# Patient Record
Sex: MALE | Race: WHITE | NOT HISPANIC OR LATINO | Employment: FULL TIME | URBAN - METROPOLITAN AREA
[De-identification: names, ages, dates, MRNs, and addresses within clinical notes are randomized per-mention and may not be internally consistent; named-entity substitution may affect disease eponyms.]

---

## 2017-06-15 ENCOUNTER — ALLSCRIPTS OFFICE VISIT (OUTPATIENT)
Dept: OTHER | Facility: OTHER | Age: 27
End: 2017-06-15

## 2018-01-02 ENCOUNTER — ALLSCRIPTS OFFICE VISIT (OUTPATIENT)
Dept: OTHER | Facility: OTHER | Age: 28
End: 2018-01-02

## 2018-01-03 NOTE — PROGRESS NOTES
Assessment   1  Acute bronchitis due to other specified organisms (466 0) (J20 8)    Plan   Acute bronchitis due to other specified organisms    · Breo Ellipta 200-25 MCG/INH Inhalation Aerosol Powder Breath Activated; 1 puff by    mouth daily - rinse mouth out after use   · Doxycycline Hyclate 100 MG Oral Capsule; Take 1 capsule twice daily   · Promethazine-Codeine 6 25-10 MG/5ML Oral Syrup; TAKE 5 ML EVERY 4 TO 6    HOURS AS NEEDED FOR COUGH    Discussion/Summary      81-year-old male here today for symptoms consistent with acute bronchitis  Based on exam findings I will cover for bacteria with a course of doxycycline b i d  with side effects discussed  Sample of [facility] was also provided in the office to help with cough and bronchospasm as well as mucus  He was advised to use 1 puff daily and rinse out mouth after every use  For nighttime coughing that has been persistent for him he was given a printed prescription for promethazine with codeine and advised to avoid working or operating a vehicle while taking  I did also recommend considering plain Sudafed OTC to help nasal congestion as well  Rest and fluids were advised  We will see how he response to treatment over the next 5-7 days and he was advised cough should symptoms persist or worsen despite treatment  Possible side effects of new medications were reviewed with the patient/guardian today  The treatment plan was reviewed with the patient/guardian  The patient/guardian understands and agrees with the treatment plan      Chief Complaint   1  Cold Symptoms   2  Cough  Patient presents with c/o cough and congestion that began a few days ago  History of Present Illness   HPI: 28y/o male here today for 1 week of URI sxs  Reports sinus pressure, nasal congestion  sore throat from coughing, mucous thick, chest feels tight and hurts  pt has tried motrin and bromfed DM  Review of Systems        Constitutional: as noted in HPI       ENT: as noted in HPI  Cardiovascular: no complaints of slow or fast heart rate, no chest pain, no palpitations, no leg claudication or lower extremity edema  Respiratory: as noted in HPI  Past Medical History   1  History of Ear pain, right (388 70) (H92 01)   2  History of Foreign body of hand, right, infected (914 7) (S60 551A,L08 9)   3  History of acute pharyngitis (V12 69) (Z87 09)   4  History of Jaw pain (784 92) (R68 84)   5  History of Right shoulder pain (719 41) (M25 511)    Social History    · Current some day smoker (305 1) (F17 200)  The social history was reviewed and updated today  Current Meds    1  No Reported Medications Recorded     The medication list was reviewed and updated today  Allergies   1  No Known Drug Allergies    Vitals    Recorded: [id number] 01:46PM   Temperature 98 1 F, Tympanic   Heart Rate 95   Pulse Quality Normal   Systolic 705, LUE, Sitting   Diastolic 90, LUE, Sitting   Height 5 ft 7 in   Weight 204 lb 4 oz   BMI Calculated 31 99   BSA Calculated 2 04   O2 Saturation 98, RA     Physical Exam        Constitutional      General appearance: Abnormal   acutely ill,-- comfortable,-- within normal limits of ideal weight,-- appears tired-- and-- appearance reflects stated age  Ears, Nose, Mouth, and Throat      External inspection of ears and nose: Normal        Otoscopic examination: Tympanic membrance translucent with normal light reflex  Canals patent without erythema  Nasal mucosa, septum, and turbinates: Abnormal   normal nasal turbinates  There was a mucoid discharge from both nares  The bilateral nasal mucosa was boggy  Oropharynx: Abnormal  -- PND  Pulmonary      Respiratory effort: Abnormal  -- loose, hacking cough  Auscultation of lungs: Abnormal  -- B/L posterior lung fields with decreased BS throughout, coarse  coughing with deep inspiration        Cardiovascular      Auscultation of heart: Normal rate and rhythm, normal S1 and S2, without murmurs  Lymphatic      Palpation of lymph nodes in neck: No lymphadenopathy  Psychiatric      Orientation to person, place and time: Normal        Mood and affect: Normal        Additional Exam:  vitals reviewed  Signatures    Electronically signed by : [facility]; Jan 2 2018  3:02PM EST                       (Author)     Electronically signed by :  Jimy Jeffries DO; Jan 2 2018  4:45PM EST                       (Author)

## 2018-01-13 VITALS
HEART RATE: 86 BPM | OXYGEN SATURATION: 98 % | BODY MASS INDEX: 30.29 KG/M2 | HEIGHT: 67 IN | TEMPERATURE: 99 F | DIASTOLIC BLOOD PRESSURE: 88 MMHG | WEIGHT: 193 LBS | SYSTOLIC BLOOD PRESSURE: 136 MMHG

## 2018-01-23 VITALS
WEIGHT: 204.25 LBS | OXYGEN SATURATION: 98 % | TEMPERATURE: 98.1 F | HEART RATE: 95 BPM | SYSTOLIC BLOOD PRESSURE: 130 MMHG | BODY MASS INDEX: 32.06 KG/M2 | DIASTOLIC BLOOD PRESSURE: 90 MMHG | HEIGHT: 67 IN

## 2018-03-26 ENCOUNTER — OFFICE VISIT (OUTPATIENT)
Dept: FAMILY MEDICINE CLINIC | Facility: CLINIC | Age: 28
End: 2018-03-26
Payer: COMMERCIAL

## 2018-03-26 VITALS
SYSTOLIC BLOOD PRESSURE: 146 MMHG | DIASTOLIC BLOOD PRESSURE: 72 MMHG | TEMPERATURE: 97.5 F | RESPIRATION RATE: 18 BRPM | HEIGHT: 67 IN | WEIGHT: 205.8 LBS | BODY MASS INDEX: 32.3 KG/M2 | HEART RATE: 102 BPM | OXYGEN SATURATION: 97 %

## 2018-03-26 DIAGNOSIS — J40 BRONCHITIS: Primary | ICD-10-CM

## 2018-03-26 PROBLEM — J02.9 ACUTE PHARYNGITIS: Status: ACTIVE | Noted: 2017-06-15

## 2018-03-26 PROBLEM — J20.8 ACUTE BRONCHITIS DUE TO OTHER SPECIFIED ORGANISMS: Status: RESOLVED | Noted: 2018-01-02 | Resolved: 2018-03-26

## 2018-03-26 PROBLEM — J02.9 ACUTE PHARYNGITIS: Status: RESOLVED | Noted: 2017-06-15 | Resolved: 2018-03-26

## 2018-03-26 PROBLEM — J20.8 ACUTE BRONCHITIS DUE TO OTHER SPECIFIED ORGANISMS: Status: ACTIVE | Noted: 2018-01-02

## 2018-03-26 PROCEDURE — 99213 OFFICE O/P EST LOW 20 MIN: CPT | Performed by: PHYSICIAN ASSISTANT

## 2018-03-26 RX ORDER — DOXYCYCLINE HYCLATE 100 MG/1
100 CAPSULE ORAL 2 TIMES DAILY
Refills: 0 | COMMUNITY
Start: 2018-01-02 | End: 2018-03-26 | Stop reason: ALTCHOICE

## 2018-03-26 RX ORDER — LEVOFLOXACIN 500 MG/1
500 TABLET, FILM COATED ORAL EVERY 24 HOURS
Qty: 10 TABLET | Refills: 0 | Status: SHIPPED | OUTPATIENT
Start: 2018-03-26 | End: 2018-04-05

## 2018-03-26 RX ORDER — AMOXICILLIN 875 MG/1
1 TABLET, COATED ORAL EVERY 12 HOURS
COMMUNITY
Start: 2017-06-15 | End: 2018-03-26 | Stop reason: ALTCHOICE

## 2018-03-26 RX ORDER — PROMETHAZINE HYDROCHLORIDE AND CODEINE PHOSPHATE 6.25; 1 MG/5ML; MG/5ML
SYRUP ORAL
Refills: 1 | COMMUNITY
Start: 2018-01-02 | End: 2018-03-26 | Stop reason: ALTCHOICE

## 2018-03-26 RX ORDER — BROMPHENIRAMINE MALEATE, PSEUDOEPHEDRINE HYDROCHLORIDE, AND DEXTROMETHORPHAN HYDROBROMIDE 2; 30; 10 MG/5ML; MG/5ML; MG/5ML
SYRUP ORAL
Refills: 0 | COMMUNITY
Start: 2017-12-30 | End: 2018-03-26 | Stop reason: ALTCHOICE

## 2018-03-27 RX ORDER — BUDESONIDE AND FORMOTEROL FUMARATE DIHYDRATE 160; 4.5 UG/1; UG/1
2 AEROSOL RESPIRATORY (INHALATION) 2 TIMES DAILY
Qty: 1 INHALER | Refills: 0 | Status: SHIPPED | COMMUNITY
Start: 2018-03-27 | End: 2018-06-02 | Stop reason: SDDI

## 2018-03-27 NOTE — PROGRESS NOTES
Assessment/Plan:      Diagnoses and all orders for this visit:    Bronchitis  -     levofloxacin (LEVAQUIN) 500 mg tablet; Take 1 tablet (500 mg total) by mouth every 24 hours for 10 days        81-year-old male presenting today for over week of respiratory symptoms  He did report a high fever last week so I do question possible influenza though being that his symptoms have been greater than 7 days I do not feel it relevant to even test   I also question possible pneumonia with the other symptoms he is having  I will place him on a course of Levaquin once daily  I recommended Mucinex DM as well as Tylenol or ibuprofen as needed for aches or pains or temperature control  I gave him a sample of Symbicort that he will use for cough and chest symptoms 2 puffs twice daily  First dose was given in office to ensure proper use and he was advised to rinse out mouth after every use  Rest and fluids and monitor symptoms  He is to call should symptoms persist or worsen despite treatment  Subjective:     Patient ID: Susan Carney is a 33 y o  male  29y/o male here today for persistent URI sxs past week  Reports fevers, hacking up discolored mucous, sore throat, fatigue, nasal congestion, chest congestion, hot/cold  Has  aged child  Pt has tried dayquil and nyquil, advil  Review of Systems   Constitutional: Positive for activity change, chills, diaphoresis, fatigue and fever  HENT: Positive for congestion, sinus pressure and sore throat  Negative for ear pain  Respiratory: Positive for cough and shortness of breath  Cardiovascular: Negative  Psychiatric/Behavioral: Negative  Objective:     Physical Exam   Constitutional: He appears well-developed  He appears ill  HENT:   Right Ear: Tympanic membrane normal    Left Ear: Tympanic membrane normal    Nose: Mucosal edema and rhinorrhea present  Mouth/Throat: Oropharynx is clear and moist    Neck: Neck supple     No LAD Cardiovascular: Normal rate, regular rhythm and normal heart sounds  Pulmonary/Chest: Effort normal  He has decreased breath sounds (mild throughout, mils coarse)  He has no wheezes  Psychiatric: He has a normal mood and affect  Vitals reviewed        Vitals:    03/26/18 1953   BP: 146/72   BP Location: Left arm   Patient Position: Sitting   Cuff Size: Large   Pulse: 102   Resp: 18   Temp: 97 5 °F (36 4 °C)   TempSrc: Tympanic   SpO2: 97%   Weight: 93 4 kg (205 lb 12 8 oz)   Height: 5' 7 3" (1 709 m)

## 2018-06-02 ENCOUNTER — OFFICE VISIT (OUTPATIENT)
Dept: URGENT CARE | Age: 28
End: 2018-06-02
Payer: COMMERCIAL

## 2018-06-02 VITALS
HEART RATE: 102 BPM | HEIGHT: 68 IN | BODY MASS INDEX: 28.79 KG/M2 | OXYGEN SATURATION: 97 % | TEMPERATURE: 99.1 F | WEIGHT: 190 LBS | SYSTOLIC BLOOD PRESSURE: 176 MMHG | RESPIRATION RATE: 20 BRPM | DIASTOLIC BLOOD PRESSURE: 88 MMHG

## 2018-06-02 DIAGNOSIS — S00.93XA CONTUSION OF HEAD, UNSPECIFIED PART OF HEAD, INITIAL ENCOUNTER: Primary | ICD-10-CM

## 2018-06-02 DIAGNOSIS — S01.311A LACERATION OF AURICLE OF RIGHT EAR, INITIAL ENCOUNTER: ICD-10-CM

## 2018-06-02 PROCEDURE — S9088 SERVICES PROVIDED IN URGENT: HCPCS | Performed by: FAMILY MEDICINE

## 2018-06-02 PROCEDURE — 99213 OFFICE O/P EST LOW 20 MIN: CPT | Performed by: FAMILY MEDICINE

## 2018-06-02 PROCEDURE — 90715 TDAP VACCINE 7 YRS/> IM: CPT

## 2018-06-02 RX ORDER — AMOXICILLIN 250 MG/5ML
250 POWDER, FOR SUSPENSION ORAL 2 TIMES DAILY
COMMUNITY
End: 2018-12-15 | Stop reason: ALTCHOICE

## 2018-06-02 RX ORDER — CEPHALEXIN 500 MG/1
500 CAPSULE ORAL EVERY 8 HOURS SCHEDULED
Qty: 30 CAPSULE | Refills: 0 | Status: SHIPPED | OUTPATIENT
Start: 2018-06-02 | End: 2018-06-12

## 2018-06-02 NOTE — PROGRESS NOTES
[facility]        NAME: Sohan Rodriguez is a 33 y o  male  : 1990    MRN: [id number]  DATE: 2018  TIME: 5:43 PM    Assessment and Plan   Contusion of head, unspecified part of head, initial encounter [S00 93XA]  1  Contusion of head, unspecified part of head, initial encounter     2  Laceration of auricle of right ear, initial encounter  cephalexin (KEFLEX) 500 mg capsule    TDAP VACCINE GREATER THAN OR EQUAL TO 6YO IM         Patient Instructions       Follow up with PCP in 3-5 days  Proceed to  ER if symptoms worsen  Chief Complaint     Chief Complaint   Patient presents with    Laceration     right ear   a laddler felt on his head and right ear  sensitive to the light    Headache     need a tetanus shot  It happen last night   Neck Pain         History of Present Illness       Patient was work with a friend and the ladder there caring fell over and he landed with a ladder hitting his right side of his head and ear  Patient states he had a large open wound just above his ear  He did not want to get it checked out last night so he just cleaned up and went to bed  Patient still has little bit of a headache but denies any nausea, vomiting, dizziness, worsening headache, double vision, blurred vision, weakness, numbness, tingling  Patient does have some discomfort in his neck with range of motion  Review of Systems   Review of Systems   Constitutional: Negative  Musculoskeletal: Positive for neck stiffness  Negative for neck pain  Skin: Positive for wound  Neurological: Positive for headaches  Negative for dizziness, seizures, syncope, weakness, light-headedness and numbness           Current Medications       Current Outpatient Prescriptions:     amoxicillin (AMOXIL) 250 mg/5 mL oral suspension, Take 250 mg by mouth 2 (two) times a day, Disp: , Rfl:     cephalexin (KEFLEX) 500 mg capsule, Take 1 capsule (500 mg total) by mouth every 8 (eight) hours for 10 days, Disp: 30 capsule, Rfl: 0    Current Allergies     Allergies as of 06/02/2018    (No Known Allergies)            The following portions of the patient's history were reviewed and updated as appropriate: allergies, current medications, past family history, past medical history, past social history, past surgical history and problem list      History reviewed  No pertinent past medical history  Past Surgical History:   Procedure Laterality Date    APPENDECTOMY         History reviewed  No pertinent family history  Medications have been verified  Objective   BP (!) 176/88   Pulse 102   Temp 99 1 °F (37 3 °C) (Temporal)   Resp 20   Ht 5' 8" (1 727 m)   Wt 86 2 kg (190 lb)   SpO2 97%   BMI 28 89 kg/m²        Physical Exam     Physical Exam   Constitutional: He is oriented to person, place, and time  He appears well-developed and well-nourished  HENT:   Head: Normocephalic  Head is with laceration  Head is without raccoon's eyes and without Burnham's sign  Left Ear: External ear normal    Neck: Normal range of motion  Neck supple  Musculoskeletal: Normal range of motion  Neurological: He is alert and oriented to person, place, and time  No cranial nerve deficit  Coordination normal    Skin: Skin is warm and dry  Psychiatric: He has a normal mood and affect  His behavior is normal    Nursing note and vitals reviewed

## 2018-06-02 NOTE — PATIENT INSTRUCTIONS
Use ice for the next 24-48 hours then may use moist heat    Recommend wrapping the ear with compression dressing at bedtime    Apply Neosporin to the laceration as directed    If you have any worsening headaches or complications go to emergency room for further evaluation

## 2018-07-02 ENCOUNTER — HOSPITAL ENCOUNTER (EMERGENCY)
Facility: HOSPITAL | Age: 28
Discharge: LEFT BEFORE DISCHARGE | End: 2018-07-02
Attending: EMERGENCY MEDICINE
Payer: COMMERCIAL

## 2018-07-02 VITALS
DIASTOLIC BLOOD PRESSURE: 76 MMHG | BODY MASS INDEX: 27.37 KG/M2 | HEART RATE: 129 BPM | WEIGHT: 180 LBS | OXYGEN SATURATION: 97 % | TEMPERATURE: 98.3 F | SYSTOLIC BLOOD PRESSURE: 176 MMHG | RESPIRATION RATE: 18 BRPM

## 2018-07-02 DIAGNOSIS — R56.9 SEIZURE (HCC): Primary | ICD-10-CM

## 2018-07-02 PROCEDURE — 99284 EMERGENCY DEPT VISIT MOD MDM: CPT

## 2018-07-02 NOTE — DISCHARGE INSTRUCTIONS
Recurrent Seizures in Adults   WHAT YOU NEED TO KNOW:   A seizure is an episode of abnormal brain activity  A seizure can cause jerky muscle movements, loss of consciousness, or confusion  Recurrent means you have a seizure more than once  The cause of your seizures may not be known  Recurrent seizures may occur if you do not take antiseizure medicine as directed  Some common triggers are alcohol, drugs, lack of sleep, fever, or a virus  High or low blood sugar levels can also trigger a seizure  DISCHARGE INSTRUCTIONS:   Call 911 or have someone else call for any of the following:   · Your seizure lasts longer than 5 minutes  · You have a second seizure within 24 hours of your first     · You have trouble breathing after a seizure  · You cannot be woken after your seizure  · You have more than 1 seizure before you are fully awake or aware  · You have diabetes or are pregnant and have a seizure  · You have a seizure in water  Return to the emergency department if:   · You are injured during a seizure  Contact your healthcare provider if:   · You have a fever  · You are planning to get pregnant or are currently pregnant  · You have questions or concerns about your condition or care  Medicine:   · Antiepileptic  medicine may be given to control or prevent seizures  Do not  stop taking this medicine without the direction of a healthcare provider  · Take your medicine as directed  Contact your healthcare provider if you think your medicine is not helping or if you have side effects  Tell him of her if you are allergic to any medicine  Keep a list of the medicines, vitamins, and herbs you take  Include the amounts, and when and why you take them  Bring the list or the pill bottles to follow-up visits  Carry your medicine list with you in case of an emergency  Prevent another seizure:   · Take your antiseizure medicine every day at the same time  This will also help reduce side effects   Do not skip any doses  Do not stop taking this medicine unless directed by a healthcare provider  · Manage stress  Stress can trigger a seizure  Exercise can help you reduce stress  Talk to your healthcare provider about exercise that is safe for you  Other ways to manage stress include yoga, meditation, and biofeedback  Illness can be a form of stress  Eat a variety of healthy foods and drink plenty of liquids during an illness  · Set a regular sleep schedule  A lack of sleep can trigger a seizure  Try to go to sleep and wake up at the same times every day  Keep your bedroom quiet and dark  Talk to your healthcare provider if you are having trouble sleeping  · Manage other medical conditions  Manage other health conditions that may increase your risk for a seizure  Keep your blood sugar levels and blood pressure under control  · Limit or do not drink alcohol as directed  Alcohol can trigger a seizure, especially if you drink a large amount at one time  A drink of alcohol is 12 ounces of beer, 1½ ounces of liquor, or 5 ounces of wine  Talk to your healthcare provider about a safe amount of alcohol for you  Your provider may recommend that you do not drink any alcohol  Tell him or her if you need help to quit drinking  Manage recurrent seizures:   · Ask what safety precautions you should take  Talk with your healthcare provider about driving  You may not be able to drive until you are seizure-free for a period of time  You will need to check the law where you live  Also talk to your healthcare provider about swimming and bathing  You may drown or develop life-threatening heart or lung damage if you have a seizure in water  · Tell your friends, family members, and coworkers that you had a seizure  Give them the following instructions to use if you have another seizure:     ¨ Do not panic  ¨ Gently guide me to the floor or a soft surface             ¨ Do not hold me down or put anything in my mouth     ¨ Place me on my side to help prevent me from swallowing saliva or vomit  ¨ Protect me from injury  Remove sharp or hard objects from the area surrounding me, or cushion my head  ¨ Loosen the clothing around my head and neck  ¨ Time how long my seizure lasts  Call 911 if my seizure lasts longer than 5 minutes or if I have a second seizure  ¨ Stay with me until my seizure ends  Let me rest until I am fully awake  ¨ Perform CPR if I stop breathing or you cannot feel my pulse  ¨ Do not give me anything to eat or drink until I am fully awake  Follow up with your healthcare provider or neurologist as directed: You may need more tests to find the cause of your seizure  You may also need tests to check the level of antiseizure medicine in your blood  Your neurologist may need to change or adjust your medicine  Write down your questions so you remember to ask them during your visits  © 2017 [employer] Information is for End User's use only and may not be sold, redistributed or otherwise used for commercial purposes  All illustrations and images included in CareNotes® are the copyrighted property of [employer]  or Krishan Sanchez  The above information is an  only  It is not intended as medical advice for individual conditions or treatments  Talk to your doctor, nurse or pharmacist before following any medical regimen to see if it is safe and effective for you

## 2018-07-03 NOTE — ED PROVIDER NOTES
History  Chief Complaint   Patient presents with    Seizure - Prior Hx Of     pt presents by EMS for suspected seizure  pt reports waking up in ambulance having had seizure  pt presents with bleeding from nose and mouth  pt requesting to sign AMA  52-year-old male comes in with paramedics and police after a seizure and then in extremely combative postictal period  The patient was initially wall uncooperative with medical staff as well as the paramedics  He was upset that he was handcuffed and restrained due to him being postictal   The patient states that he had a seizure and has a history of seizures however his noncompliant and has not followed up with a primary care doctor or Neurology  The patient immediately stated that he wanted to sign out against medical advice  He did not want any testing done nor did he want the CT of his head  Prior to Admission Medications   Prescriptions Last Dose Informant Patient Reported? Taking?   amoxicillin (AMOXIL) 250 mg/5 mL oral suspension  Self Yes No   Sig: Take 250 mg by mouth 2 (two) times a day      Facility-Administered Medications: None       Past Medical History:   Diagnosis Date    Seizures ([facility] [address] )        Past Surgical History:   Procedure Laterality Date    APPENDECTOMY         No family history on file  I have reviewed and agree with the history as documented  Social History   Substance Use Topics    Smoking status: Never Smoker    Smokeless tobacco: Never Used    Alcohol use Yes      Comment: Daily        Review of Systems   Constitutional: Negative for chills, fatigue and fever  HENT: Negative for sore throat  Respiratory: Negative for cough, chest tightness and shortness of breath  Cardiovascular: Negative for chest pain and palpitations  Gastrointestinal: Negative for abdominal pain, constipation, diarrhea, nausea and vomiting  Genitourinary: Negative for difficulty urinating and dysuria     Musculoskeletal: Negative for back pain  Skin: Negative for rash  Neurological: Positive for seizures  Negative for dizziness, syncope, weakness and headaches  All other systems reviewed and are negative  Physical Exam  Physical Exam   Constitutional: He is oriented to person, place, and time  He appears well-developed and well-nourished  No distress  HENT:   Head: Normocephalic and atraumatic  Head is without raccoon's eyes and without Burnham's sign  Right Ear: External ear normal  No hemotympanum  Left Ear: External ear normal  No hemotympanum  Nose: No nasal deformity, septal deviation or nasal septal hematoma  Epistaxis ( Dried) is observed  Eyes: Conjunctivae and EOM are normal  Pupils are equal, round, and reactive to light  Neck: Normal range of motion  No tracheal deviation present  Cardiovascular: Normal rate, regular rhythm and normal heart sounds  No murmur heard  Pulmonary/Chest: Effort normal and breath sounds normal  No respiratory distress  He exhibits no tenderness  Abdominal: Soft  He exhibits no distension  There is no tenderness  There is no rebound and no guarding  Musculoskeletal: Normal range of motion  He exhibits no tenderness  Neurological: He is alert and oriented to person, place, and time  He has normal reflexes  Skin: Skin is warm and dry  Psychiatric: He has a normal mood and affect  Nursing note and vitals reviewed        Vital Signs  ED Triage Vitals   Temperature Pulse Respirations Blood Pressure SpO2   07/02/18 1033 07/02/18 1028 07/02/18 1028 07/02/18 1028 07/02/18 1028   98 3 °F (36 8 °C) (!) 129 18 (!) 176/76 97 %      Temp Source Heart Rate Source Patient Position - Orthostatic VS BP Location FiO2 (%)   07/02/18 1033 -- -- -- --   Oral          Pain Score       07/02/18 1028       No Pain           Vitals:    07/02/18 1028   BP: (!) 176/76   Pulse: (!) 129       Visual Acuity      ED Medications  Medications - No data to display    Diagnostic Studies  Results Reviewed None                 No orders to display              Procedures  Procedures       Phone Contacts  ED Phone Contact    ED Course                               MDM  Number of Diagnoses or Management Options  [facility]):   Diagnosis management comments: The patient was adamant about signing out against medical advice  After  Talking to my was able to have the patient come down  The patient was able to answer questions regarding his medical history, his recall of the history of present illness  The patient was awake, alert, and oriented x3  I informed the patient that I needed to determine his capacity prior to allowing him to sign out against medical advice  With nursing present, I discussed the risk of signing out against medical advice which include recurrent seizure, potential head injury with clinically significant intracranial bleed, skull fracture  The patient was able to understand and verbalize back the risk of signing out against medical advice as it may result death disability and loss of current lifestyle  The patient demonstrated capacity to sign out against medical advice  He also understood that he was welcome to return at any time for completion of testing  The patient was also given the number for Neurology to follow up with as he had not followed up with Neurology  The patient's girlfriend was present at the end of the visit as he was signing out against medical advice and I reviewed these warnings with him with her present as well      CritCare Time    Disposition  Final diagnoses:   [facility])     Time reflects when diagnosis was documented in both MDM as applicable and the Disposition within this note     Time User Action Codes Description Comment    7/2/2018 10:35 AM Ronn Kanner Add [R56 9] [facility])       ED Disposition     ED Disposition Condition Comment    AMA  Date: 7/2/2018  Patient: Sb Sahni  Admitted: 7/2/2018 10:29 AM  Attending Provider: Carl Conner DO Oscar Chatmanmoriah or his authorized caregiver has made the decision for the patient to leave the emergency department against the advice of his a ttending physician  He or his authorized caregiver has been informed and understands the inherent risks, including death, seizure, head injury, stroke, brain injury, loss of current lifestyle  He or his authorized caregiver has decided to accept the res ponsibility for this decision  Kimble Holter and all necessary parties have been advised that he may return for further evaluation or treatment  His condition at time of discharge was stable  Kimble Holter had current vital signs as follows:  BP (!) 1 76/76   Pulse (!) 129   Temp 98 3 °F (36 8 °C) (Oral)   Resp 18   Wt 81 6 kg (180 lb)         Follow-up Information     Follow up With Specialties Details Why Contact Info    Rain Benton DO Family Medicine Schedule an appointment as soon as possible for a visit For further evaluation [address] [place] [id number] [facility] [address] [facility] Neurology [facility] Schedule an appointment as soon as possible for a visit For further evaluation of your seizures [address] [place]gar[place] [id number]  [phone]          Discharge Medication List as of 7/2/2018 10:36 AM      CONTINUE these medications which have NOT CHANGED    Details   amoxicillin (AMOXIL) 250 mg/5 mL oral suspension Take 250 mg by mouth 2 (two) times a day, Historical Med           No discharge procedures on file      ED Provider  Electronically Signed by           Linda Juarez DO  07/03/18 1002

## 2018-07-04 ENCOUNTER — HOSPITAL ENCOUNTER (EMERGENCY)
Facility: HOSPITAL | Age: 28
Discharge: HOME | End: 2018-07-05
Attending: EMERGENCY MEDICINE | Admitting: EMERGENCY MEDICINE
Payer: COMMERCIAL

## 2018-07-04 ENCOUNTER — APPOINTMENT (EMERGENCY)
Dept: RADIOLOGY | Facility: HOSPITAL | Age: 28
End: 2018-07-04
Payer: COMMERCIAL

## 2018-07-04 VITALS
DIASTOLIC BLOOD PRESSURE: 94 MMHG | SYSTOLIC BLOOD PRESSURE: 168 MMHG | TEMPERATURE: 98 F | OXYGEN SATURATION: 98 % | RESPIRATION RATE: 21 BRPM | HEART RATE: 103 BPM

## 2018-07-04 DIAGNOSIS — F10.929 ALCOHOL INTOXICATION (HCC): Primary | ICD-10-CM

## 2018-07-04 DIAGNOSIS — R45.851 SUICIDAL IDEATIONS: ICD-10-CM

## 2018-07-04 DIAGNOSIS — R56.9 SEIZURE (HCC): ICD-10-CM

## 2018-07-04 LAB
AMPHETAMINES SERPL QL SCN: NEGATIVE
BARBITURATES UR QL: NEGATIVE
BENZODIAZ UR QL: NEGATIVE
COCAINE UR QL: POSITIVE
ETHANOL EXG-MCNC: 0.15 MG/DL
ETHANOL EXG-MCNC: 0.18 MG/DL
METHADONE UR QL: NEGATIVE
OPIATES UR QL SCN: NEGATIVE
PCP UR QL: NEGATIVE
THC UR QL: NEGATIVE

## 2018-07-04 PROCEDURE — 82075 ASSAY OF BREATH ETHANOL: CPT | Performed by: EMERGENCY MEDICINE

## 2018-07-04 PROCEDURE — 80307 DRUG TEST PRSMV CHEM ANLYZR: CPT | Performed by: EMERGENCY MEDICINE

## 2018-07-04 RX ORDER — LORAZEPAM 2 MG/ML
1 INJECTION INTRAMUSCULAR ONCE
Status: DISCONTINUED | OUTPATIENT
Start: 2018-07-04 | End: 2018-07-05 | Stop reason: HOSPADM

## 2018-07-04 NOTE — ED NOTES
Pts wife was in waiting room and asked to talk with me, the pts nurse  I started speaking with the wife, aDne, who expressed concern about SI, drug use, volition behaviors, increased seizures in the last 8-10 days  The wife would like to have him committed for mental health issues as she states that the pt has been expressing the need for help with his drug use, ETHO use, depression and anxiety  The pt has 2 small children at home 8mos and 4 yrs    Dr Shetty was brought into our conversation        Juan David Hale RN  07/04/18 9247

## 2018-07-04 NOTE — ED NOTES
Pt reports drinking today, and does not take any medications for his seizures   Before today his last seizure was yesterday     Sarika Ramesh RN  07/04/18 9282
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
## 2018-07-04 NOTE — ED NOTES
Dr Gladis Boo at bedside expressing the concerns for pt care  Pt continues to refuse care and testing at this time  Dr Gladis Boo stated that we would have crisis come speak with him and his wife        Corbin Zhu RN  07/04/18 7163

## 2018-07-04 NOTE — ED NOTES
Pt has repeatedly refused exam, care including - blood testing, POCT ETHO, CT of head, EKG, urine testing  PT does not feel the tests are needed and wants to leave  I have expressed my concerns for the need of testing d/t his multi falls related to reported seizures by himself and his wife  I have educated the pt on why each test has been ordered and its importance multi times          Davy Jolly RN  07/04/18 1796

## 2018-07-05 PROCEDURE — 99285 EMERGENCY DEPT VISIT HI MDM: CPT

## 2018-07-05 NOTE — ED CARE HANDOFF
Emergency Department Sign Out Note        Sign out and transfer of care from Dr Benton Shall  See Separate Emergency Department note  The patient, Sandoval Fisher, was evaluated by the previous provider for seizure disorder  ED Course / Workup Pending (followup): Patient was observed until clinically sober  He is requesting to be discharged and denies any depression, suicidality, homicidality, or hallucinations  Not interested in a 201  Spoke with girlfriend, who reports that he is currently withdrawing from opioids and that he does make suicidal comments at home but always while intoxicated  Evaluated by crisis, who spoke with [place] regarding possibility of a 36 by girlfriend; they do not feel that there are reasonable legal grounds to hold the patient against his will  Patient counseled to return immediately if he wants help for depression or addiction  Counseled girlfriend to avoid situations with him that she perceives as dangerous to herself  Patient discharged with outpatient resources provided by crisis  Procedures  [facility]  CritCare Time    Disposition  Final diagnoses:   Seizure ([facility] )   Suicidal ideations   Alcohol intoxication ([facility] )     Time reflects when diagnosis was documented in both MDM as applicable and the Disposition within this note     Time User Action Codes Description Comment    7/4/2018  9:08 PM Minor, Ellen Add [R56 9] Seizure ([facility] )     7/4/2018  9:09 PM Minor, Ellen Add [R45 851] Suicidal ideations     7/4/2018  9:09 PM Minor, Ellen Modify [R56 9] Seizure ([facility] )     7/4/2018  9:09 PM Minor, Ellen Modify [R45 851] Suicidal ideations     7/4/2018  9:09 PM Minor, Ellen Modify [R56 9] Seizure ([facility] )     7/4/2018  9:09 PM Minor, Ellen Modify [R45 851] Suicidal ideations     7/4/2018  9:09 PM Minor, Ellen Add [F10 929] Alcohol intoxication [facility])       ED Disposition     ED Disposition Condition Comment    Discharge  Sandoval Fisher discharge to home/self care      Condition at discharge: [address] [facility] [address] Follow-up Information     Follow up With Specialties Details Why Contact Info    [place] Boris, DO Family Medicine Call in 1 day  [address]  [phone]          Discharge Medication List as of 7/5/2018  1:04 AM      CONTINUE these medications which have NOT CHANGED    Details   amoxicillin (AMOXIL) 250 mg/5 mL oral suspension Take 250 mg by mouth 2 (two) times a day, Historical Med           No discharge procedures on file         ED Provider  Electronically Signed by     Dago Brian MD  07/05/18 2507

## 2018-07-05 NOTE — ED NOTES
Pt brougth in by girlfriend because he made a suicidal statement to her and has been every time he is intoxicated  Pt has denied SI/HI/AH/VH since his arrival to the ED  Per girlfriend pt has a history abusing opiates and alcohol  He recently relapsed on opiates  Pt did not want to sign a 201   Pt to DC home to follow up with OP resources

## 2018-07-05 NOTE — ED NOTES
Pt is anger that we had him preform POCT ETHO x2 and the first recorded 0 185 the pt coughed so a second test was done, and that result was 0 176 after a good breath  Pt is fighting his restraints at this time  Family is in waiting room wanting to come back, I explained that this is not the time but will call them once the pt settles down        Theodor Juan, JOVI  07/04/18 2016

## 2018-07-05 NOTE — DISCHARGE INSTRUCTIONS
Alcohol Use Disorder   WHAT YOU NEED TO KNOW:   Alcohol use disorder (AUD) is problem drinking  AUD includes alcohol abuse and alcohol dependency  DISCHARGE INSTRUCTIONS:   Seek care immediately if:   · Your heart is beating faster than usual     · You have hallucinations  · You cannot remember what happens while you are drinking  · You have seizures  Contact your healthcare provider if:   · You are anxious and have nausea  · Your hands are shaky and you are sweating heavily  · You have questions or concerns about your condition or care  Follow up with your healthcare provider as directed:  Do not try to stop drinking on your own  Your healthcare provider may need to help you withdraw from alcohol safely  He may need to admit you to the hospital  You may also need any of the following treatments:  · Medicines to decrease your craving for alcohol    · Support groups such as Alcoholics Anonymous     · Therapy from a psychiatrist or psychologist     · Admission to an inpatient facility for treatment for severe AUD  For support and more information:   · Substance Abuse and [address] , [address]  Web Address: https://[employer]/  · Alcoholics Anonymous  Web Address: http://[employer]/  © 2017 [address] Information is for End User's use only and may not be sold, redistributed or otherwise used for commercial purposes  All illustrations and images included in CareNotes® are the copyrighted property of A D A M , Inc  or Krishan Sanchez  The above information is an  only  It is not intended as medical advice for individual conditions or treatments  Talk to your doctor, nurse or pharmacist before following any medical regimen to see if it is safe and effective for you

## 2018-12-15 ENCOUNTER — OFFICE VISIT (OUTPATIENT)
Dept: URGENT CARE | Facility: MEDICAL CENTER | Age: 28
End: 2018-12-15
Payer: COMMERCIAL

## 2018-12-15 VITALS
BODY MASS INDEX: 30.92 KG/M2 | OXYGEN SATURATION: 100 % | RESPIRATION RATE: 16 BRPM | SYSTOLIC BLOOD PRESSURE: 118 MMHG | HEART RATE: 96 BPM | TEMPERATURE: 97.4 F | HEIGHT: 68 IN | DIASTOLIC BLOOD PRESSURE: 76 MMHG | WEIGHT: 204 LBS

## 2018-12-15 DIAGNOSIS — R10.9 FLANK PAIN: Primary | ICD-10-CM

## 2018-12-15 DIAGNOSIS — S39.012A STRAIN OF MUSCLE, FASCIA AND TENDON OF LOWER BACK, INITIAL ENCOUNTER: ICD-10-CM

## 2018-12-15 LAB
SL AMB  POCT GLUCOSE, UA: NEGATIVE
SL AMB LEUKOCYTE ESTERASE,UA: NEGATIVE
SL AMB POCT BILIRUBIN,UA: NEGATIVE
SL AMB POCT BLOOD,UA: NEGATIVE
SL AMB POCT CLARITY,UA: CLEAR
SL AMB POCT COLOR,UA: YELLOW
SL AMB POCT KETONES,UA: ABNORMAL
SL AMB POCT NITRITE,UA: NEGATIVE
SL AMB POCT PH,UA: 6.5
SL AMB POCT SPECIFIC GRAVITY,UA: 1.01
SL AMB POCT URINE PROTEIN: NEGATIVE
SL AMB POCT UROBILINOGEN: NEGATIVE

## 2018-12-15 PROCEDURE — 81002 URINALYSIS NONAUTO W/O SCOPE: CPT | Performed by: FAMILY MEDICINE

## 2018-12-15 PROCEDURE — 99213 OFFICE O/P EST LOW 20 MIN: CPT | Performed by: FAMILY MEDICINE

## 2018-12-15 PROCEDURE — S9088 SERVICES PROVIDED IN URGENT: HCPCS | Performed by: FAMILY MEDICINE

## 2018-12-15 NOTE — PATIENT INSTRUCTIONS
Urine dipstick is unremarkable  History and physical suggest low back strain  No evidence of radiculopathy  I doubt that he has kidney stones  Advised patient to decreased weight lifting for 2-3 days  Apply he compress to affected area consider ibuprofen as needed  Follow up with PCP symptoms persist or worsen  Acute Low Back Pain, Ambulatory Care   GENERAL INFORMATION:   Acute low back pain  is discomfort in your lower back area that lasts for less than 12 weeks  The word acute is used to describe pain that starts suddenly, worsens quickly, and lasts for a short time  Common symptoms include the following:   · Back stiffness or spasms    · Pain down the back or side of one leg    · Holding yourself in an unusual position or posture to decrease your back pain    · Not being able to find a sitting position that is comfortable    · Slow increase in your pain for 24 to 48 hours after you stress your back    · Tenderness on your lower back or severe pain when you move your back  Seek immediate care for the following symptoms:   · Severe pain    · Sudden stiffness and heaviness in both buttocks down to both legs    · Numbness or weakness in one leg, or pain in both legs    · Numbness in your genital area or across your lower back    · Unable to control your urine or bowel movements  Treatment for acute low back pain  may include any of the following:  · Medicines:      ¨ NSAIDs  help decrease swelling and pain or fever  This medicine is available with or without a doctor's order  NSAIDs can cause stomach bleeding or kidney problems in certain people  If you take blood thinner medicine, always ask your healthcare provider if NSAIDs are safe for you  Always read the medicine label and follow directions  ¨ Muscle relaxers  help decrease muscle spasms pain  ¨ Prescription pain medicine  may be given  Ask how to take this medicine safely      · Surgery  may be needed if your pain is severe and other treatments do not work  Surgery may be needed for conditions of the lumbar spine, such as herniated disc or spinal stenosis  Manage your symptoms:   · Sleep on a firm mattress  If you do not have a firm mattress, have someone move your mattress to the floor for a few days  A piece of plywood under your mattress can also help make it firmer  · Apply ice  on your lower back for 15 to 20 minutes every hour or as directed  Use an ice pack, or put crushed ice in a plastic bag  Cover it with a towel  Ice helps prevent tissue damage and decreases swelling and pain  You can alternate ice and heat  · Apply heat  on your lower back for 20 to 30 minutes every 2 hours for as many days as directed  Heat helps decrease pain and muscle spasms  · Go to physical therapy  A physical therapist teaches you exercises to help improve movement and strength, and to decrease pain  Prevent acute low back pain:   · Use proper body mechanics  ¨ Bend at the hips and knees when you  objects  Do not bend from the waist  Use your leg muscles as you lift the load  Do not use your back  Keep the object close to your chest as you lift it  Try not to twist or lift anything above your waist     ¨ Change your position often when you stand for long periods of time  Rest one foot on a small box or footrest, and then switch to the other foot often  ¨ Try not to sit for long periods of time  When you do, sit in a straight-backed chair with your feet flat on the floor  Never reach, pull, or push while you are sitting  · Exercise regularly  Warm up before you exercise  Do exercises that strengthen your back muscles  Ask about the best exercise plan for you  · Maintain a healthy weight  Ask your healthcare provider how much you should weigh  Ask him to help you create a weight loss plan if you are overweight    Follow up with your healthcare provider as directed:  Return for a follow-up visit if you still have pain after 1 to 3 weeks of treatment  You may need to visit an orthopedist if your back pain lasts more than 6 to 12 weeks  Write down your questions so you remember to ask them during your visits  CARE AGREEMENT:   You have the right to help plan your care  Learn about your health condition and how it may be treated  Discuss treatment options with your caregivers to decide what care you want to receive  You always have the right to refuse treatment  The above information is an  only  It is not intended as medical advice for individual conditions or treatments  Talk to your doctor, nurse or pharmacist before following any medical regimen to see if it is safe and effective for you  © 2014 [address] is for End User's use only and may not be sold, redistributed or otherwise used for commercial purposes  All illustrations and images included in CareNotes® are the copyrighted property of A D [employer] M [employer]  or Krishan Sanchez

## 2018-12-15 NOTE — PROGRESS NOTES
[facility]        NAME: Santosh Ambrosio is a 33 y o  male  : 1990    MRN: [id number]  DATE: December 15, 2018  TIME: 5:04 PM    Assessment and Plan   Flank pain [R10 9]  1  Flank pain  POCT urine dip   2  Strain of muscle, fascia and tendon of lower back, initial encounter           Patient Instructions       Follow up with PCP in 3-5 days  Proceed to  ER if symptoms worsen  Chief Complaint     Chief Complaint   Patient presents with    Flank Pain     Pt with complaints of left flank pain since yesterday, today pain radiating around to side  Worse with movement  History of Present Illness       Patient complaining of right flank pain which began earlier today  He was pressure like  Traveling down into the right groin area  Denies any urgency or dysuria  He was concerned he might have had a stone  He began drinking a lot of fluids except which seemed to dissipate symptoms  Denies any fever chills  However, he does describe 2 day history of loose watery diarrhea unaware of any blood or mucus  He has been complaining of some nausea but no vomiting  No previous history of kidney stone in the past   He does describes the pain is worse upon bending  He does do a lot a weight lifting for exercise  Review of Systems   Review of Systems   Constitutional: Negative  Gastrointestinal: Positive for diarrhea  Genitourinary: Negative  Musculoskeletal: Positive for back pain  Current Medications     No current outpatient prescriptions on file      Current Allergies     Allergies as of 12/15/2018    (No Known Allergies)            The following portions of the patient's history were reviewed and updated as appropriate: allergies, current medications, past family history, past medical history, past social history, past surgical history and problem list      Past Medical History:   Diagnosis Date    Seizures [facility])        Past Surgical History:   Procedure Laterality Date  APPENDECTOMY         No family history on file  Medications have been verified  Objective   /76 (BP Location: Left arm, Patient Position: Sitting, Cuff Size: Large)   Pulse 96   Temp (!) 97 4 °F (36 3 °C) (Tympanic)   Resp 16   Ht 5' 8" (1 727 m)   Wt 92 5 kg (204 lb)   SpO2 100%   BMI 31 02 kg/m²        Physical Exam     Physical Exam   Abdominal: Soft  Bowel sounds are normal  There is no tenderness  Musculoskeletal: Normal range of motion  LS spine:  Flexion to 45°, extension to 30° lateral rotation side bending 45°  Tenderness upon palpation right flank area  Extremities:  Lower-good strength and tone, 5/5

## 2018-12-21 ENCOUNTER — APPOINTMENT (EMERGENCY)
Dept: CT IMAGING | Facility: HOSPITAL | Age: 28
End: 2018-12-21
Payer: COMMERCIAL

## 2018-12-21 ENCOUNTER — HOSPITAL ENCOUNTER (EMERGENCY)
Facility: HOSPITAL | Age: 28
Discharge: HOME | End: 2018-12-21
Attending: EMERGENCY MEDICINE | Admitting: EMERGENCY MEDICINE
Payer: COMMERCIAL

## 2018-12-21 VITALS
DIASTOLIC BLOOD PRESSURE: 81 MMHG | HEART RATE: 113 BPM | SYSTOLIC BLOOD PRESSURE: 151 MMHG | RESPIRATION RATE: 16 BRPM | TEMPERATURE: 99 F | OXYGEN SATURATION: 100 %

## 2018-12-21 DIAGNOSIS — F19.10 DRUG ABUSE (HCC): Primary | ICD-10-CM

## 2018-12-21 LAB
ALBUMIN SERPL BCP-MCNC: 4.2 G/DL (ref 3–5.2)
ALP SERPL-CCNC: 43 U/L (ref 43–122)
ALT SERPL W P-5'-P-CCNC: 68 U/L (ref 9–52)
AMPHETAMINES SERPL QL SCN: POSITIVE
ANION GAP SERPL CALCULATED.3IONS-SCNC: 11 MMOL/L (ref 5–14)
APAP SERPL-MCNC: <10 UG/ML (ref 10–30)
AST SERPL W P-5'-P-CCNC: 69 U/L (ref 17–59)
BARBITURATES UR QL: NEGATIVE
BASOPHILS # BLD AUTO: 0.1 THOUSANDS/ΜL (ref 0–0.1)
BASOPHILS NFR BLD AUTO: 1 % (ref 0–1)
BENZODIAZ UR QL: NEGATIVE
BILIRUB SERPL-MCNC: 1 MG/DL
BUN SERPL-MCNC: 12 MG/DL (ref 5–25)
CALCIUM SERPL-MCNC: 8.6 MG/DL (ref 8.4–10.2)
CHLORIDE SERPL-SCNC: 95 MMOL/L (ref 97–108)
CK MB SERPL-MCNC: 5.1 NG/ML (ref 0–2.4)
CK MB SERPL-MCNC: 5.9 NG/ML (ref 0–2.4)
CK MB SERPL-MCNC: <1 % (ref 0–2.5)
CK MB SERPL-MCNC: <1 % (ref 0–2.5)
CK SERPL-CCNC: 761 U/L (ref 55–170)
CK SERPL-CCNC: 817 U/L (ref 55–170)
CO2 SERPL-SCNC: 31 MMOL/L (ref 22–30)
COCAINE UR QL: POSITIVE
CREAT SERPL-MCNC: 1.13 MG/DL (ref 0.7–1.5)
EOSINOPHIL # BLD AUTO: 0.2 THOUSAND/ΜL (ref 0–0.4)
EOSINOPHIL NFR BLD AUTO: 2 % (ref 0–6)
ERYTHROCYTE [DISTWIDTH] IN BLOOD BY AUTOMATED COUNT: 12.2 %
ETHANOL SERPL-MCNC: <10 MG/DL (ref 0–10)
GFR SERPL CREATININE-BSD FRML MDRD: 89 ML/MIN/1.73SQ M
GLUCOSE SERPL-MCNC: 153 MG/DL (ref 70–99)
HCT VFR BLD AUTO: 47.8 % (ref 41–53)
HGB BLD-MCNC: 16.2 G/DL (ref 13.5–17.5)
LYMPHOCYTES # BLD AUTO: 1.6 THOUSANDS/ΜL (ref 0.5–4)
LYMPHOCYTES NFR BLD AUTO: 15 % (ref 25–45)
MCH RBC QN AUTO: 30.4 PG (ref 26–34)
MCHC RBC AUTO-ENTMCNC: 33.9 G/DL (ref 31–36)
MCV RBC AUTO: 90 FL (ref 80–100)
METHADONE UR QL: NEGATIVE
MONOCYTES # BLD AUTO: 0.8 THOUSAND/ΜL (ref 0.2–0.9)
MONOCYTES NFR BLD AUTO: 8 % (ref 1–10)
NEUTROPHILS # BLD AUTO: 7.7 THOUSANDS/ΜL (ref 1.8–7.8)
NEUTS SEG NFR BLD AUTO: 74 % (ref 45–65)
OPIATES UR QL SCN: POSITIVE
PCP UR QL: NEGATIVE
PLATELET # BLD AUTO: 288 THOUSANDS/UL (ref 150–450)
PMV BLD AUTO: 7.3 FL (ref 8.9–12.7)
POTASSIUM SERPL-SCNC: 3.4 MMOL/L (ref 3.6–5)
PROT SERPL-MCNC: 7.6 G/DL (ref 5.9–8.4)
RBC # BLD AUTO: 5.33 MILLION/UL (ref 4.5–5.9)
SODIUM SERPL-SCNC: 137 MMOL/L (ref 137–147)
THC UR QL: NEGATIVE
WBC # BLD AUTO: 10.3 THOUSAND/UL (ref 4.5–11)

## 2018-12-21 PROCEDURE — 96374 THER/PROPH/DIAG INJ IV PUSH: CPT

## 2018-12-21 PROCEDURE — 93005 ELECTROCARDIOGRAM TRACING: CPT

## 2018-12-21 PROCEDURE — 85025 COMPLETE CBC W/AUTO DIFF WBC: CPT | Performed by: EMERGENCY MEDICINE

## 2018-12-21 PROCEDURE — 36415 COLL VENOUS BLD VENIPUNCTURE: CPT | Performed by: EMERGENCY MEDICINE

## 2018-12-21 PROCEDURE — 96376 TX/PRO/DX INJ SAME DRUG ADON: CPT

## 2018-12-21 PROCEDURE — 99285 EMERGENCY DEPT VISIT HI MDM: CPT

## 2018-12-21 PROCEDURE — 80053 COMPREHEN METABOLIC PANEL: CPT | Performed by: EMERGENCY MEDICINE

## 2018-12-21 PROCEDURE — 80307 DRUG TEST PRSMV CHEM ANLYZR: CPT | Performed by: EMERGENCY MEDICINE

## 2018-12-21 PROCEDURE — 96375 TX/PRO/DX INJ NEW DRUG ADDON: CPT

## 2018-12-21 PROCEDURE — 82550 ASSAY OF CK (CPK): CPT | Performed by: EMERGENCY MEDICINE

## 2018-12-21 PROCEDURE — 82553 CREATINE MB FRACTION: CPT | Performed by: EMERGENCY MEDICINE

## 2018-12-21 PROCEDURE — 96361 HYDRATE IV INFUSION ADD-ON: CPT

## 2018-12-21 PROCEDURE — 80320 DRUG SCREEN QUANTALCOHOLS: CPT | Performed by: EMERGENCY MEDICINE

## 2018-12-21 PROCEDURE — 80329 ANALGESICS NON-OPIOID 1 OR 2: CPT | Performed by: EMERGENCY MEDICINE

## 2018-12-21 PROCEDURE — 70450 CT HEAD/BRAIN W/O DYE: CPT

## 2018-12-21 RX ORDER — LORAZEPAM 2 MG/ML
0.5 INJECTION INTRAMUSCULAR ONCE
Status: COMPLETED | OUTPATIENT
Start: 2018-12-21 | End: 2018-12-21

## 2018-12-21 RX ORDER — NALOXONE HYDROCHLORIDE 1 MG/ML
INJECTION INTRAMUSCULAR; INTRAVENOUS; SUBCUTANEOUS
Status: DISCONTINUED
Start: 2018-12-21 | End: 2018-12-21 | Stop reason: HOSPADM

## 2018-12-21 RX ORDER — ONDANSETRON 2 MG/ML
4 INJECTION INTRAMUSCULAR; INTRAVENOUS ONCE
Status: COMPLETED | OUTPATIENT
Start: 2018-12-21 | End: 2018-12-21

## 2018-12-21 RX ORDER — LORAZEPAM 2 MG/ML
1 INJECTION INTRAMUSCULAR ONCE
Status: COMPLETED | OUTPATIENT
Start: 2018-12-21 | End: 2018-12-21

## 2018-12-21 RX ADMIN — LORAZEPAM 0.5 MG: 2 INJECTION INTRAMUSCULAR; INTRAVENOUS at 16:18

## 2018-12-21 RX ADMIN — SODIUM CHLORIDE 1000 ML: 9 INJECTION, SOLUTION INTRAVENOUS at 16:06

## 2018-12-21 RX ADMIN — SODIUM CHLORIDE 1000 ML: 9 INJECTION, SOLUTION INTRAVENOUS at 18:02

## 2018-12-21 RX ADMIN — ONDANSETRON 4 MG: 2 INJECTION, SOLUTION INTRAMUSCULAR; INTRAVENOUS at 19:30

## 2018-12-21 RX ADMIN — LORAZEPAM 1 MG: 2 INJECTION INTRAMUSCULAR; INTRAVENOUS at 19:30

## 2018-12-21 RX ADMIN — ONDANSETRON 4 MG: 2 INJECTION, SOLUTION INTRAMUSCULAR; INTRAVENOUS at 16:03

## 2018-12-21 NOTE — ED NOTES
Patient placed on continuous cardiac monitoring and placed on 2L NC  Patient c/o "not feeling right" offers no other complaints at this time       Hiro Flores RN  12/21/18 9406

## 2018-12-21 NOTE — ED PROVIDER NOTES
Pt Name: Carlos Rdz  MRN: [id number]  [place] 1990  Age/Sex: 33 y o  male  Date of evaluation: 12/21/2018  PCP: Mary Lou Yao PA-C    CHIEF COMPLAINT    Chief Complaint   Patient presents with    Overdose - Accidental     patient states that he snorted a percocet and thinks it was laced with fentanyl  states "it looked weird" per ems patient was cyanotic with agonal respirations upon their arrival  received total of 4,g narcan  presents to dept alert and oriented x 4 with non labored respirations         HPI    Sheryle Hermann presents to the Emergency Department complaining of feeling like he's going to pass out  He just crushed and snorted a pill that he believed to be a percocet but now believes it was laced with something  He has been using a lot of drugs lately  HPI      Past Medical and Surgical History    Past Medical History:   Diagnosis Date    Seizures [facility])        Past Surgical History:   Procedure Laterality Date    APPENDECTOMY         History reviewed  No pertinent family history  Social History   Substance Use Topics    Smoking status: Never Smoker    Smokeless tobacco: Never Used    Alcohol use Yes      Comment: Daily           Allergies    No Known Allergies    Home Medications    Prior to Admission medications    Not on File           Review of Systems    Review of Systems   Constitutional: Negative for activity change, appetite change, chills, fatigue and fever  HENT: Negative for congestion, rhinorrhea, sinus pressure, sneezing, sore throat and trouble swallowing  Eyes: Negative for photophobia and visual disturbance  Respiratory: Negative for chest tightness, shortness of breath and wheezing  Cardiovascular: Negative for chest pain and leg swelling  Gastrointestinal: Positive for nausea and vomiting  Negative for abdominal distention, abdominal pain, constipation and diarrhea  Endocrine: Negative for polydipsia, polyphagia and polyuria  Genitourinary: Negative for decreased urine volume, difficulty urinating, dysuria, flank pain, frequency and urgency  Musculoskeletal: Negative for back pain, gait problem, joint swelling and neck pain  Skin: Negative for color change, pallor and rash  Allergic/Immunologic: Negative for immunocompromised state  Neurological: Positive for dizziness and light-headedness  Negative for seizures, syncope, speech difficulty, weakness and headaches  Psychiatric/Behavioral: Negative for confusion  All other systems reviewed and are negative  Physical Exam      ED Triage Vitals [12/21/18 1538]   Temperature Pulse Respirations Blood Pressure SpO2   99 °F (37 2 °C) 103 16 (!) 173/116 96 %      Temp Source Heart Rate Source Patient Position - Orthostatic VS BP Location FiO2 (%)   Tympanic Monitor Sitting Right arm --      Pain Score       No Pain               Physical Exam   Constitutional: He is oriented to person, place, and time  He appears well-developed and well-nourished  No distress  HENT:   Head: Normocephalic and atraumatic  Nose: Nose normal    Mouth/Throat: Oropharynx is clear and moist    Eyes: Pupils are equal, round, and reactive to light  Conjunctivae and EOM are normal    Neck: Normal range of motion  Neck supple  Cardiovascular: Normal rate, regular rhythm and normal heart sounds  Exam reveals no gallop and no friction rub  No murmur heard  Pulmonary/Chest: Effort normal and breath sounds normal  No respiratory distress  He has no wheezes  He has no rales  Abdominal: Soft  Bowel sounds are normal  There is no tenderness  There is no rebound and no guarding  Musculoskeletal: Normal range of motion  Neurological: He is alert and oriented to person, place, and time  Skin: Skin is warm and dry  He is not diaphoretic  Psychiatric: He has a normal mood and affect  His behavior is normal    Nursing note and vitals reviewed        Assessment and [address] is a 33 y o  male who presents with altered mental status after he took drugs  Plan will be to perform diagnostic testing and treat symptomatically        MDM    Diagnostic Results      Labs:    Results for orders placed or performed during the hospital encounter of 12/21/18   Rapid drug screen, urine   Result Value Ref Range    Amph/Meth UR Positive (A) Negative    Barbiturate Ur Negative Negative    Benzodiazepine Urine Negative Negative    Cocaine Urine Positive (A) Negative    Methadone Urine Negative Negative    Opiate Urine Positive (A) Negative    PCP Ur Negative Negative    THC Urine Negative Negative   CBC and differential   Result Value Ref Range    WBC 10 30 4 50 - 11 00 Thousand/uL    RBC 5 33 4 50 - 5 90 Million/uL    Hemoglobin 16 2 13 5 - 17 5 g/dL    Hematocrit 47 8 41 0 - 53 0 %    MCV 90 80 - 100 fL    MCH 30 4 26 0 - 34 0 pg    MCHC 33 9 31 0 - 36 0 g/dL    RDW 12 2 <15 3 %    MPV 7 3 (L) 8 9 - 12 7 fL    Platelets 554 707 - 351 Thousands/uL    Neutrophils Relative 74 (H) 45 - 65 %    Lymphocytes Relative 15 (L) 25 - 45 %    Monocytes Relative 8 1 - 10 %    Eosinophils Relative 2 0 - 6 %    Basophils Relative 1 0 - 1 %    Neutrophils Absolute 7 70 1 80 - 7 80 Thousands/µL    Lymphocytes Absolute 1 60 0 50 - 4 00 Thousands/µL    Monocytes Absolute 0 80 0 20 - 0 90 Thousand/µL    Eosinophils Absolute 0 20 0 00 - 0 40 Thousand/µL    Basophils Absolute 0 10 0 00 - 0 10 Thousands/µL   Comprehensive metabolic panel   Result Value Ref Range    Sodium 137 137 - 147 mmol/L    Potassium 3 4 (L) 3 6 - 5 0 mmol/L    Chloride 95 (L) 97 - 108 mmol/L    CO2 31 (H) 22 - 30 mmol/L    ANION GAP 11 5 - 14 mmol/L    BUN 12 5 - 25 mg/dL    Creatinine 1 13 0 70 - 1 50 mg/dL    Glucose 153 (H) 70 - 99 mg/dL    Calcium 8 6 8 4 - 10 2 mg/dL    AST 69 (H) 17 - 59 U/L    ALT 68 (H) 9 - 52 U/L    Alkaline Phosphatase 43 43 - 122 U/L    Total Protein 7 6 5 9 - 8 4 g/dL    Albumin 4 2 3 0 - 5 2 g/dL    Total Bilirubin 1 00 <1 30 mg/dL    eGFR 89 >60 ml/min/1 73sq m   Ethanol   Result Value Ref Range    Ethanol Lvl <10 0 - 10 mg/dL   Acetaminophen level   Result Value Ref Range    Acetaminophen Level <10 (L) 10 - 30 ug/mL   CK (with reflex to MB)   Result Value Ref Range    Total  (H) 55 - 170 U/L   CKMB   Result Value Ref Range    CK-MB Index <1 0 0 0 - 2 5 %    CK-MB 5 9 (H) 0 0 - 2 4 ng/mL   CK (with reflex to MB)   Result Value Ref Range    Total  (H) 55 - 170 U/L   CKMB   Result Value Ref Range    CK-MB Index <1 0 0 0 - 2 5 %    CK-MB 5 1 (H) 0 0 - 2 4 ng/mL   ECG 12 lead   Result Value Ref Range    Ventricular Rate 95 BPM    Atrial Rate 95 BPM    MO Interval 158 ms    QRSD Interval 104 ms    QT Interval 358 ms    QTC Interval 449 ms    P [place] 72 degrees    QRS Axis 50 degrees    T Wave Axis 31 degrees       All labs reviewed and utilized in the medical decision making process    Radiology:    CT head without contrast   Final Result      No acute intracranial abnormality  Workstation performed: [id number]             All radiology studies independently viewed by me and interpreted by the radiologist     Procedure    Procedures    CritCare Time      ED Course of Care and Re-Assessments    Patient was observed in ER for several hours    He was aggressively hydrated and showed clinical improvement    b    Medications   ondansetron (ZOFRAN) injection 4 mg (4 mg Intravenous Given 12/21/18 1603)   sodium chloride 0 9 % bolus 1,000 mL (0 mL Intravenous Stopped 12/21/18 1801)   LORazepam (ATIVAN) 2 mg/mL injection 0 5 mg (0 5 mg Intravenous Given 12/21/18 1618)   sodium chloride 0 9 % bolus 1,000 mL (0 mL Intravenous Stopped 12/21/18 2043)   ondansetron (ZOFRAN) injection 4 mg (4 mg Intravenous Given 12/21/18 1930)   LORazepam (ATIVAN) 2 mg/mL injection 1 mg (1 mg Intravenous Given 12/21/18 1930)           FINAL IMPRESSION    Final diagnoses:   Drug abuse [facility])         DISPOSITION/PLAN    Time reflects when diagnosis was documented in both MDM as applicable and the Disposition within this note     Time User Action Codes Description Comment    12/21/2018  9:09 PM Aleshia Law Add [F19 10] Drug abuse [facility])       ED Disposition     ED Disposition Condition Comment    Discharge  Sara Diver discharge to home/self care  Condition at discharge: Good        Follow-up Information     Follow up With Specialties Details Why Contact Info    Kristy Collet, PA-C Family Medicine, Physician Assistant Schedule an appointment as soon as possible for a visit  [address]  [address] [facility] [place]  [phone]      [facility] Emergency Department Emergency Medicine Go to As needed, If symptoms worsen [address] [address] [phone]            PATIENT REFERRED TO:    Kristy Collet, PA-C  [address]  [address] [facility] [place]  [phone]    Schedule an appointment as soon as possible for a visit      [facility] Emergency Department  [address] [address] [phone]  Go to  As needed, If symptoms worsen      DISCHARGE MEDICATIONS:    There are no discharge medications for this patient  No discharge procedures on file           Reese Dumas, [address] [facility], DO  12/23/18 0527

## 2018-12-22 LAB
ATRIAL RATE: 95 BPM
P AXIS: 72 DEGREES
PR INTERVAL: 158 MS
QRS AXIS: 50 DEGREES
QRSD INTERVAL: 104 MS
QT INTERVAL: 358 MS
QTC INTERVAL: 449 MS
T WAVE AXIS: 31 DEGREES
VENTRICULAR RATE: 95 BPM

## 2018-12-22 PROCEDURE — 93010 ELECTROCARDIOGRAM REPORT: CPT | Performed by: INTERNAL MEDICINE

## 2018-12-22 NOTE — DISCHARGE INSTRUCTIONS
Drugs of abuse testing, urine   GENERAL INFORMATION:  What is this panel? Laboratory panels (a set of tests) are done for many reasons  Panels may be performed for routine health screenings or if a disease or toxicity is suspected  Panels may be used to determine if a medical condition is improving or worsening  Panels may also be used to measure the success or failure of a medication or treatment plan  Lab panels may be ordered for professional or legal reasons  Laboratory tests included in a panel may require one or more samples of different types  For example, both blood and urine samples may be needed  The samples may be taken at different times and using different methods  The following are tests that may be included in this panel:   · Benzodiazepine measurement, urine  · Drugs of abuse urine screening test  · Lysergic acid diethylamide measurement  · Urine amphetamine measurement  · Urine barbiturate measurement  · Urine cannabinoids screening test  · Urine codeine measurement  · Urine flunitrazepam level  · Urine opiates screening test  · Urine propoxyphene screening test    CARE AGREEMENT:   You have the right to help plan your care  To help with this plan, you must learn about your health condition and how it may be treated  You can then discuss treatment options with your caregivers  Work with them to decide what care may be used to treat you  You always have the right to refuse treatment  © Copyright [employer] 2018  Information is for End User's use only and may not be sold, redistributed or otherwise used for commercial purposes  The above information is an  only  It is not intended as a substitute for medical advice for your individual conditions or treatments  Talk to your doctor, nurse or pharmacist before following any medical regimen to see if it is safe and effective for you

## 2019-05-31 ENCOUNTER — TELEPHONE (OUTPATIENT)
Dept: FAMILY MEDICINE CLINIC | Facility: CLINIC | Age: 29
End: 2019-05-31

## 2020-03-04 ENCOUNTER — HOSPITAL ENCOUNTER (INPATIENT)
Facility: HOSPITAL | Age: 30
LOS: 3 days | Discharge: LEFT BEFORE DISCHARGE | DRG: 871 | End: 2020-03-07
Attending: EMERGENCY MEDICINE | Admitting: INTERNAL MEDICINE
Payer: COMMERCIAL

## 2020-03-04 DIAGNOSIS — I33.0 INFECTIVE ENDOCARDITIS: Primary | ICD-10-CM

## 2020-03-04 DIAGNOSIS — F11.10 HEROIN ABUSE (HCC): ICD-10-CM

## 2020-03-04 DIAGNOSIS — Z91.19 NONCOMPLIANCE: ICD-10-CM

## 2020-03-04 DIAGNOSIS — I26.90 SEPTIC PULMONARY EMBOLISM (HCC): ICD-10-CM

## 2020-03-04 DIAGNOSIS — B95.61 STAPHYLOCOCCUS AUREUS BACTEREMIA: ICD-10-CM

## 2020-03-04 DIAGNOSIS — R78.81 STAPHYLOCOCCUS AUREUS BACTEREMIA: ICD-10-CM

## 2020-03-04 LAB
ALBUMIN SERPL BCP-MCNC: 3.5 G/DL (ref 3.5–5)
ALP SERPL-CCNC: 87 U/L (ref 46–116)
ALT SERPL W P-5'-P-CCNC: 203 U/L (ref 12–78)
ANION GAP SERPL CALCULATED.3IONS-SCNC: 9 MMOL/L (ref 4–13)
APTT PPP: 30 SECONDS (ref 23–37)
AST SERPL W P-5'-P-CCNC: 52 U/L (ref 5–45)
BASOPHILS # BLD AUTO: 0.05 THOUSANDS/ΜL (ref 0–0.1)
BASOPHILS NFR BLD AUTO: 0 % (ref 0–1)
BILIRUB SERPL-MCNC: 0.31 MG/DL (ref 0.2–1)
BUN SERPL-MCNC: 11 MG/DL (ref 5–25)
CALCIUM SERPL-MCNC: 8.5 MG/DL (ref 8.3–10.1)
CHLORIDE SERPL-SCNC: 107 MMOL/L (ref 100–108)
CO2 SERPL-SCNC: 26 MMOL/L (ref 21–32)
CREAT SERPL-MCNC: 1 MG/DL (ref 0.6–1.3)
EOSINOPHIL # BLD AUTO: 0.19 THOUSAND/ΜL (ref 0–0.61)
EOSINOPHIL NFR BLD AUTO: 2 % (ref 0–6)
ERYTHROCYTE [DISTWIDTH] IN BLOOD BY AUTOMATED COUNT: 11.8 % (ref 11.6–15.1)
GFR SERPL CREATININE-BSD FRML MDRD: 102 ML/MIN/1.73SQ M
GLUCOSE SERPL-MCNC: 98 MG/DL (ref 65–140)
HCT VFR BLD AUTO: 45.8 % (ref 36.5–49.3)
HGB BLD-MCNC: 15.8 G/DL (ref 12–17)
IMM GRANULOCYTES # BLD AUTO: 0.08 THOUSAND/UL (ref 0–0.2)
IMM GRANULOCYTES NFR BLD AUTO: 1 % (ref 0–2)
INR PPP: 1.12 (ref 0.84–1.19)
LACTATE SERPL-SCNC: 1 MMOL/L (ref 0.5–2)
LYMPHOCYTES # BLD AUTO: 2.9 THOUSANDS/ΜL (ref 0.6–4.47)
LYMPHOCYTES NFR BLD AUTO: 23 % (ref 14–44)
MCH RBC QN AUTO: 29.9 PG (ref 26.8–34.3)
MCHC RBC AUTO-ENTMCNC: 34.5 G/DL (ref 31.4–37.4)
MCV RBC AUTO: 87 FL (ref 82–98)
MONOCYTES # BLD AUTO: 1.38 THOUSAND/ΜL (ref 0.17–1.22)
MONOCYTES NFR BLD AUTO: 11 % (ref 4–12)
NEUTROPHILS # BLD AUTO: 7.84 THOUSANDS/ΜL (ref 1.85–7.62)
NEUTS SEG NFR BLD AUTO: 63 % (ref 43–75)
NRBC BLD AUTO-RTO: 0 /100 WBCS
PLATELET # BLD AUTO: 278 THOUSANDS/UL (ref 149–390)
PMV BLD AUTO: 8.9 FL (ref 8.9–12.7)
POTASSIUM SERPL-SCNC: 3.8 MMOL/L (ref 3.5–5.3)
PROT SERPL-MCNC: 7.6 G/DL (ref 6.4–8.2)
PROTHROMBIN TIME: 14 SECONDS (ref 11.6–14.5)
RBC # BLD AUTO: 5.28 MILLION/UL (ref 3.88–5.62)
SODIUM SERPL-SCNC: 142 MMOL/L (ref 136–145)
WBC # BLD AUTO: 12.44 THOUSAND/UL (ref 4.31–10.16)

## 2020-03-04 PROCEDURE — 93005 ELECTROCARDIOGRAM TRACING: CPT

## 2020-03-04 PROCEDURE — 36415 COLL VENOUS BLD VENIPUNCTURE: CPT | Performed by: EMERGENCY MEDICINE

## 2020-03-04 PROCEDURE — 85610 PROTHROMBIN TIME: CPT | Performed by: EMERGENCY MEDICINE

## 2020-03-04 PROCEDURE — 81003 URINALYSIS AUTO W/O SCOPE: CPT | Performed by: EMERGENCY MEDICINE

## 2020-03-04 PROCEDURE — 80053 COMPREHEN METABOLIC PANEL: CPT | Performed by: EMERGENCY MEDICINE

## 2020-03-04 PROCEDURE — 83605 ASSAY OF LACTIC ACID: CPT | Performed by: EMERGENCY MEDICINE

## 2020-03-04 PROCEDURE — 85730 THROMBOPLASTIN TIME PARTIAL: CPT | Performed by: EMERGENCY MEDICINE

## 2020-03-04 PROCEDURE — 99284 EMERGENCY DEPT VISIT MOD MDM: CPT

## 2020-03-04 PROCEDURE — 87040 BLOOD CULTURE FOR BACTERIA: CPT | Performed by: EMERGENCY MEDICINE

## 2020-03-04 PROCEDURE — 84145 PROCALCITONIN (PCT): CPT | Performed by: EMERGENCY MEDICINE

## 2020-03-04 PROCEDURE — 99285 EMERGENCY DEPT VISIT HI MDM: CPT | Performed by: EMERGENCY MEDICINE

## 2020-03-04 PROCEDURE — 85025 COMPLETE CBC W/AUTO DIFF WBC: CPT | Performed by: EMERGENCY MEDICINE

## 2020-03-05 PROBLEM — F17.200 NICOTINE DEPENDENCE: Status: ACTIVE | Noted: 2020-03-05

## 2020-03-05 PROBLEM — R51.9 HEADACHE: Status: ACTIVE | Noted: 2020-03-05

## 2020-03-05 PROBLEM — B19.20 HEPATITIS C: Status: ACTIVE | Noted: 2020-03-05

## 2020-03-05 PROBLEM — R74.01 TRANSAMINITIS: Status: ACTIVE | Noted: 2020-03-05

## 2020-03-05 LAB
ATRIAL RATE: 95 BPM
ATRIAL RATE: 95 BPM
BILIRUB UR QL STRIP: NEGATIVE
CLARITY UR: CLEAR
COLOR UR: YELLOW
GLUCOSE UR STRIP-MCNC: NEGATIVE MG/DL
HGB UR QL STRIP.AUTO: NEGATIVE
KETONES UR STRIP-MCNC: NEGATIVE MG/DL
LACTATE SERPL-SCNC: 1 MMOL/L (ref 0.5–2)
LEUKOCYTE ESTERASE UR QL STRIP: NEGATIVE
NITRITE UR QL STRIP: NEGATIVE
P AXIS: 70 DEGREES
P AXIS: 84 DEGREES
PH UR STRIP.AUTO: 5.5 [PH]
PR INTERVAL: 146 MS
PR INTERVAL: 148 MS
PROCALCITONIN SERPL-MCNC: 0.05 NG/ML
PROT UR STRIP-MCNC: NEGATIVE MG/DL
QRS AXIS: 103 DEGREES
QRS AXIS: 111 DEGREES
QRSD INTERVAL: 102 MS
QRSD INTERVAL: 104 MS
QT INTERVAL: 338 MS
QT INTERVAL: 340 MS
QTC INTERVAL: 424 MS
QTC INTERVAL: 427 MS
SP GR UR STRIP.AUTO: 1.02 (ref 1–1.03)
T WAVE AXIS: 16 DEGREES
T WAVE AXIS: 26 DEGREES
UROBILINOGEN UR QL STRIP.AUTO: 0.2 E.U./DL
VENTRICULAR RATE: 95 BPM
VENTRICULAR RATE: 95 BPM

## 2020-03-05 PROCEDURE — 83605 ASSAY OF LACTIC ACID: CPT | Performed by: EMERGENCY MEDICINE

## 2020-03-05 PROCEDURE — 99255 IP/OBS CONSLTJ NEW/EST HI 80: CPT | Performed by: INTERNAL MEDICINE

## 2020-03-05 PROCEDURE — 99254 IP/OBS CNSLTJ NEW/EST MOD 60: CPT | Performed by: EMERGENCY MEDICINE

## 2020-03-05 PROCEDURE — 93010 ELECTROCARDIOGRAM REPORT: CPT | Performed by: INTERNAL MEDICINE

## 2020-03-05 RX ORDER — ONDANSETRON 2 MG/ML
4 INJECTION INTRAMUSCULAR; INTRAVENOUS EVERY 4 HOURS PRN
Status: ACTIVE | OUTPATIENT
Start: 2020-03-05 | End: 2020-03-06

## 2020-03-05 RX ORDER — CLONIDINE HYDROCHLORIDE 0.2 MG/1
0.2 TABLET ORAL EVERY 2 HOUR PRN
Status: DISCONTINUED | OUTPATIENT
Start: 2020-03-04 | End: 2020-03-06

## 2020-03-05 RX ORDER — IBUPROFEN 600 MG/1
TABLET ORAL
Status: COMPLETED
Start: 2020-03-05 | End: 2020-03-05

## 2020-03-05 RX ORDER — LORAZEPAM 2 MG/ML
2 INJECTION INTRAMUSCULAR EVERY 2 HOUR PRN
Status: DISCONTINUED | OUTPATIENT
Start: 2020-03-04 | End: 2020-03-05

## 2020-03-05 RX ORDER — LORAZEPAM 2 MG/ML
1 INJECTION INTRAMUSCULAR
Status: DISCONTINUED | OUTPATIENT
Start: 2020-03-05 | End: 2020-03-06

## 2020-03-05 RX ORDER — IBUPROFEN 600 MG/1
600 TABLET ORAL EVERY 6 HOURS PRN
Status: DISCONTINUED | OUTPATIENT
Start: 2020-03-05 | End: 2020-03-07 | Stop reason: HOSPADM

## 2020-03-05 RX ORDER — LORAZEPAM 2 MG/ML
2 INJECTION INTRAMUSCULAR EVERY 2 HOUR PRN
Status: DISCONTINUED | OUTPATIENT
Start: 2020-03-05 | End: 2020-03-05

## 2020-03-05 RX ORDER — GABAPENTIN 300 MG/1
300 CAPSULE ORAL 3 TIMES DAILY
Status: DISPENSED | OUTPATIENT
Start: 2020-03-05 | End: 2020-03-07

## 2020-03-05 RX ORDER — GABAPENTIN 300 MG/1
CAPSULE ORAL
Status: DISPENSED
Start: 2020-03-05 | End: 2020-03-06

## 2020-03-05 RX ORDER — MAGNESIUM SULFATE HEPTAHYDRATE 40 MG/ML
2 INJECTION, SOLUTION INTRAVENOUS ONCE
Status: COMPLETED | OUTPATIENT
Start: 2020-03-05 | End: 2020-03-05

## 2020-03-05 RX ORDER — BUPRENORPHINE AND NALOXONE 2; .5 MG/1; MG/1
2 FILM, SOLUBLE BUCCAL; SUBLINGUAL ONCE
Status: COMPLETED | OUTPATIENT
Start: 2020-03-05 | End: 2020-03-05

## 2020-03-05 RX ADMIN — LORAZEPAM 2 MG: 2 INJECTION INTRAMUSCULAR; INTRAVENOUS at 11:52

## 2020-03-05 RX ADMIN — IBUPROFEN 600 MG: 600 TABLET ORAL at 03:27

## 2020-03-05 RX ADMIN — NICOTINE 7 MG: 7 PATCH TRANSDERMAL at 11:01

## 2020-03-05 RX ADMIN — MAGNESIUM SULFATE HEPTAHYDRATE 2 G: 40 INJECTION, SOLUTION INTRAVENOUS at 06:51

## 2020-03-05 RX ADMIN — BUPRENORPHINE AND NALOXONE 2 FILM: 2; .5 FILM BUCCAL; SUBLINGUAL at 11:00

## 2020-03-05 RX ADMIN — GABAPENTIN 300 MG: 300 CAPSULE ORAL at 20:21

## 2020-03-05 RX ADMIN — GABAPENTIN 300 MG: 300 CAPSULE ORAL at 03:27

## 2020-03-05 RX ADMIN — GABAPENTIN 300 MG: 300 CAPSULE ORAL at 11:01

## 2020-03-05 RX ADMIN — IBUPROFEN 600 MG: 600 TABLET, FILM COATED ORAL at 03:27

## 2020-03-05 RX ADMIN — OXACILLIN SODIUM 2000 MG: 1 INJECTION, POWDER, FOR SOLUTION INTRAMUSCULAR; INTRAVENOUS at 09:23

## 2020-03-05 RX ADMIN — OXACILLIN SODIUM 2000 MG: 1 INJECTION, POWDER, FOR SOLUTION INTRAMUSCULAR; INTRAVENOUS at 06:04

## 2020-03-05 RX ADMIN — CEFAZOLIN SODIUM 2000 MG: 10 INJECTION, POWDER, FOR SOLUTION INTRAVENOUS at 15:54

## 2020-03-05 RX ADMIN — LORAZEPAM 1 MG: 2 INJECTION INTRAMUSCULAR; INTRAVENOUS at 20:23

## 2020-03-05 NOTE — SOCIAL WORK
CM discussed pt in care coordination round  s CM explained CM role  PTA Pt lives alone in apartment  Pt is independent  W/ ADLS & Ambulation  Pt uses [facility]  NO POA No PCP, [employer] provided  No DME No VNA, short term rehab, psych or D & A Admission history  Employed & dirves  Emergency contact Be Negro/mother [phone]    CM reviewed d/c planning process including the following: identifying help at home, patient preference for d/c planning needs, Discharge Lounge, [facility] Meds to Bed program, availability of treatment team to discuss questions or concerns patient and/or family may have regarding understanding medications and recognizing signs and symptoms once discharged  CM also encouraged patient to follow up with all recommended appointments after discharge  Patient advised of importance for patient and family to participate in managing patients medical well being      Discharge checklist discussed with patient

## 2020-03-05 NOTE — H&P
INTERNAL MEDICINE HISTORY AND PHYSICAL  - SOD Team B     NAME: Vangie Damon  AGE: 30 y o  SEX: male  : 1990   MRN: [id number]  ENCOUNTER: [id number]    DATE: 3/5/2020  TIME: 4:27 AM    Primary Care Physician: No primary care provider on file  Admitting Provider: Mehrdad Denise MD    Chief complaint:  Shortness of breath    History of Present Illness     Vangie Damon is a 30 y o  male with past medical history significant of heroin abuse who was recently diagnosed with Staph coccus bacteremia and was admitted [facility] and was initially treated for MSSA bacteremia but signed out AMA earlier this morning in used heroin upon discharge  At this time he denies any shortness of breath chest pain, palpitations, fevers, chills  Does endorse headaches which have been on and off for 2-3 weeks along with some intermittent double vision  He was found to have septic pulmonary emboli from presumed infective endocarditis in the setting of IV drug use  He was treated with oxacillin for 3 days  He had a TTE performed which did not show any obvious vegetations  He also had a MAURA performed which did not show any obvious vegetations however this study was technically difficult    Review of Systems   Review of Systems   Constitutional: Negative for appetite change, chills, diaphoresis, fatigue, fever and unexpected weight change  HENT: Negative for rhinorrhea and sore throat  Eyes: Negative for photophobia and visual disturbance  Respiratory: Positive for shortness of breath  Negative for cough and wheezing  Cardiovascular: Negative for chest pain, palpitations and leg swelling  Gastrointestinal: Negative for abdominal pain, anal bleeding, blood in stool, constipation, diarrhea, nausea and vomiting  Genitourinary: Negative for decreased urine volume, difficulty urinating, dysuria, flank pain, frequency, hematuria and urgency     Musculoskeletal: Negative for arthralgias, back pain, joint swelling and myalgias  Skin: Negative for color change and rash  Neurological: Positive for headaches  Negative for dizziness, seizures, facial asymmetry, speech difficulty and numbness  Psychiatric/Behavioral: Negative for agitation, confusion and decreased concentration  The patient is not nervous/anxious  Past Medical History     Past Medical History:   Diagnosis Date    Seizures [facility])        Past Surgical History     Past Surgical History:   Procedure Laterality Date    APPENDECTOMY         Social History     Social History     Substance and Sexual Activity   Alcohol Use Yes    Comment: Daily     Social History     Substance and Sexual Activity   Drug Use Yes    Types: Prescription, Heroin, Cocaine     Social History     Tobacco Use   Smoking Status Current Every Day Smoker    Packs/day: 0 50    Types: Cigarettes   Smokeless Tobacco Never Used       Family History   No family history on file  Medications Prior to Admission     Prior to Admission medications    Not on File       Allergies   No Known Allergies    Objective     Vitals:    03/04/20 2310 03/05/20 0102   BP: (!) 199/91 125/75   BP Location: Right arm    Pulse: (!) 110 92   Resp: 18 18   Temp: 98 3 °F (36 8 °C) 97 9 °F (36 6 °C)   TempSrc: Oral    SpO2: 97% 96%   Weight: 83 9 kg (185 lb)      Body mass index is 28 13 kg/m²  No intake or output data in the 24 hours ending 03/05/20 0427  Invasive Devices     Peripheral Intravenous Line            Peripheral IV 03/04/20 Left Forearm less than 1 day                Physical Exam  GENERAL: Appears well-developed and well-nourished  Appears in no acute distress   HEENT: Normocephalic and atraumatic  No scleral icterus  PERRLA  EOMI B/L  No oropharyngeal edema  MM moist    NECK: Neck supple with no lymphadenopathy  Trachea midline  No JVD  CARDIOVASCULAR: S1 and S2 are present  Regular rate and rhythm  No murmurs, rubs, or gallops     RESPIRATORY: CTA B/L, no rales, rhonci or wheezes  Normal respiratory expansion  ABDOMINAL: Bowel sounds present in all 4 quadrants, non-tender, soft, non-distended  No organomegaly, rebound, or guarding  EXTREMITIES: 2+ DP and PT pulses bilaterally; no cyanosis, clubbing, edema  ROM intact  MUSTFAA x4   MUSCULOSKELETAL: No joint tenderness, deformity or swelling, full range of motion without pain  NEUROLOGIC: Patient is alert and oriented to person, place, and time  No sensory or motor deficits  CN 2-12 intact  Plantars downgoing bilaterally  Speech fluent  SKIN: Skin is warm and dry  No skin lesions are present  No rashes  PSYCHIATRIC: Normal mood and affect     Lab Results: I have personally reviewed pertinent reports  CBC:   Results from last 7 days   Lab Units 03/04/20  2328   WBC Thousand/uL 12 44*   RBC Million/uL 5 28   HEMOGLOBIN g/dL 15 8   HEMATOCRIT % 45 8   MCV fL 87   MCH pg 29 9   MCHC g/dL 34 5   RDW % 11 8   MPV fL 8 9   PLATELETS Thousands/uL 278   NRBC AUTO /100 WBCs 0   NEUTROS PCT % 63   LYMPHS PCT % 23   MONOS PCT % 11   EOS PCT % 2   BASOS PCT % 0   NEUTROS ABS Thousands/µL 7 84*   LYMPHS ABS Thousands/µL 2 90   MONOS ABS Thousand/µL 1 38*   EOS ABS Thousand/µL 0 19       Imaging: I have personally reviewed pertinent films in PACS  No results found  Microbiology: cultures obtained in emergency department include     Urinalysis:   Results from last 7 days   Lab Units 03/04/20  2334   COLOR UA  Yellow   CLARITY UA  Clear   SPEC GRAV UA  1 021   PH UA  5 5   LEUKOCYTES UA  Negative   NITRITE UA  Negative   GLUCOSE UA mg/dl Negative   KETONES UA mg/dl Negative   BILIRUBIN UA  Negative   BLOOD UA  Negative        Urine Micro:        EKG, Pathology, and Other Studies: I have personally reviewed pertinent reports        Medications given in Emergency Department     Medication Administration - last 24 hours from 03/04/2020 0427 to 03/05/2020 0427       Date/Time Order Dose Route Action Action by     03/05/2020 9149 gabapentin (NEURONTIN) capsule 300 mg 300 mg Oral Given Elver Estrada RN     03/05/2020 0327 ibuprofen (MOTRIN) tablet 600 mg 600 mg Oral Given Elver Or RN          Assessment and Plan     Problem List     Head contusion    Laceration of auricle of right ear    Opioid use disorder ([facility] [address] )    Acute bacterial endocarditis    Acute septic pulmonary embolism ([facility] [address] )    Staphylococcus aureus bacteremia    Hepatitis        MSSA bacteremia  Blood cultures from March 1st showed MSSA bacteremia  Patient was treated with Oxacillin for 3 days prior to signing out [facility]  He had noted septic pulmonary emboli so he was presumed to have endocarditis however TTE and MAURA did not show any obvious vegetations however it MAURA was technically difficult  Will restart Oxacillin  Repeat blood cultures pending  Will require at least 2 weeks IV antibiotics  ID consult placed    Opiate withdrawal  Last heroin use this morning  Toxicology on board  Suboxone ordered for the a m  Clonidine 0 2 mg Q 2 p r n  Lorazepam 2 mg Q 2 p r n  Zofran 4 mg q 4h p r n  Scheduled gabapentin 300 mg q 8 H    Hepatitis C  Active in on treated with detectable viral load   Notable transaminitis, however T bili, INR within normal limits  Will need treatment on outpatient basis      Headache  Reports intermittent headaches for the past 2 weeks with associated double vision  Head CT scan of the head on March 1st which was benign showed no acute pathologygiven bacteremia with associated possible endocarditis consider septic emboli consider MRI of the brain        Code Status: Level 1 - Full Code  VTE Pharmacologic Prophylaxis: Sequential compression device (Venodyne)    VTE Mechanical Prophylaxis: sequential compression device  Admission Status: OBSERVATION    Admission Time  I spent 45 minutes admitting the patient    This involved direct patient contact where I performed a full history and physical, reviewing previous records, and reviewing laboratory and other diagnostic studies      Marisela Turpin MD  Internal Medicine  PGY-3

## 2020-03-05 NOTE — ED PROVIDER NOTES
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  Patient today did heroin after signing out AMA and was convinced by friend to come to [facility] for care  Patient denies any symptoms at this time he complains of pain in his right hand which he broke weeks ago  Denies any chest pains nausea vomiting shortness breath  Vital signs are otherwise normal except for tachycardia and hypertension  History provided by:  Patient   used: No    Medical Problem   Severity:  Severe  Onset quality:  Gradual  Timing:  Constant  Progression:  Worsening  Associated symptoms: no abdominal pain, no chest pain, no diarrhea, no fatigue, no fever, no headaches, no nausea, no rash, no shortness of breath, no sore throat and no vomiting        None       Past Medical History:   Diagnosis Date    Seizures ([address] )        Past Surgical History:   Procedure Laterality Date    APPENDECTOMY         No family history on file  I have reviewed and agree with the history as documented      E-Cigarette/Vaping    E-Cigarette Use Current Some Day User      E-Cigarette/Vaping Substances    Nicotine Yes     Flavoring Yes      Social History     Tobacco Use    Smoking status: Current Every Day Smoker     Packs/day: 0 50     Types: Cigarettes    Smokeless tobacco: Never Used   Substance Use Topics    Alcohol use: Yes     Comment: Daily    Drug use: Yes     Types: Prescription, Heroin, Cocaine        Review of Systems   Constitutional: Negative for chills, fatigue and fever  HENT: Negative for sore throat  Eyes: Negative for visual disturbance  Respiratory: Negative for shortness of breath  Cardiovascular: Negative for chest pain  Gastrointestinal: Negative for abdominal pain, constipation, diarrhea, nausea and vomiting  Genitourinary: Negative for difficulty urinating, dysuria and hematuria  Musculoskeletal: Positive for arthralgias  Skin: Negative for rash  Neurological: Negative for syncope, weakness and headaches  Hematological: Negative for adenopathy  Psychiatric/Behavioral: Negative for agitation and behavioral problems  All other systems reviewed and are negative  Physical Exam  ED Triage Vitals [03/04/20 2310]   Temperature Pulse Respirations Blood Pressure SpO2   98 3 °F (36 8 °C) (!) 110 18 (!) 199/91 97 %      Temp Source Heart Rate Source Patient Position - Orthostatic VS BP Location FiO2 (%)   Oral Monitor Lying Right arm --      Pain Score       5             Orthostatic Vital Signs  Vitals:    03/04/20 2310   BP: (!) 199/91   Pulse: (!) 110   Patient Position - Orthostatic VS: Lying       Physical Exam   Constitutional: He is oriented to person, place, and time  He appears well-developed and well-nourished  HENT:   Head: Normocephalic and atraumatic  Eyes: Conjunctivae and EOM are normal  No scleral icterus  Neck: Normal range of motion  Neck supple  Cardiovascular: Normal rate and regular rhythm  No murmur heard  Pulmonary/Chest: Effort normal and breath sounds normal    Abdominal: Soft  Bowel sounds are normal  There is no tenderness  Musculoskeletal: Normal range of motion  Neurological: He is alert and oriented to person, place, and time  Skin: Skin is warm and dry  Psychiatric: He has a normal mood and affect  His behavior is normal    Nursing note and vitals reviewed        ED Medications  Medications - No data to display    Diagnostic Studies  Results Reviewed     Procedure Component Value Units Date/Time    Comprehensive metabolic panel [[id number]]  (Abnormal) Collected:  03/04/20 2328    Lab Status:  Final result Specimen:  Blood from Arm, Left Updated:  03/04/20 2356     Sodium 142 mmol/L      Potassium 3 8 mmol/L      Chloride 107 mmol/L      CO2 26 mmol/L      ANION GAP 9 mmol/L      BUN 11 mg/dL      Creatinine 1 00 mg/dL      Glucose 98 mg/dL      Calcium 8 5 mg/dL      AST 52 U/L       U/L      Alkaline Phosphatase 87 U/L      Total Protein 7 6 g/dL      Albumin 3 5 g/dL      Total Bilirubin 0 31 mg/dL      eGFR 102 ml/min/1 73sq m     Narrative:       [place] guidelines for Chronic Kidney Disease (CKD):     Stage 1 with normal or high GFR (GFR > 90 mL/min/1 73 square meters)    Stage 2 Mild CKD (GFR = 60-89 mL/min/1 73 square meters)    Stage 3A Moderate CKD (GFR = 45-59 mL/min/1 73 square meters)    Stage 3B Moderate CKD (GFR = 30-44 mL/min/1 73 square meters)    Stage 4 Severe CKD (GFR = 15-29 mL/min/1 73 square meters)    Stage 5 End Stage CKD (GFR <15 mL/min/1 73 square meters)  Note: GFR calculation is accurate only with a steady state creatinine    APTT [[id number]]  (Normal) Collected:  03/04/20 2328    Lab Status:  Final result Specimen:  Blood from Arm, Left Updated:  03/04/20 2349     PTT 30 seconds     Protime-INR [[id number]]  (Normal) Collected:  03/04/20 2328    Lab Status:  Final result Specimen:  Blood from Arm, Left Updated:  03/04/20 2349     Protime 14 0 seconds      INR 1 12    Blood culture #1 [[id number]] Collected:  03/04/20 2338    Lab Status: In process Specimen:  Blood from Hand, Left Updated:  03/04/20 2345    UA w Reflex to Microscopic w Reflex to Culture [[id number]] Collected:  03/04/20 2334    Lab Status:   In process Specimen:  Urine, Clean Catch Updated:  03/04/20 2345    CBC and differential [[id number]]  (Abnormal) Collected:  03/04/20 2328    Lab Status:  Final result Specimen:  Blood from Arm, Left Updated:  03/04/20 2343 WBC 12 44 Thousand/uL      RBC 5 28 Million/uL      Hemoglobin 15 8 g/dL      Hematocrit 45 8 %      MCV 87 fL      MCH 29 9 pg      MCHC 34 5 g/dL      RDW 11 8 %      MPV 8 9 fL      Platelets 840 Thousands/uL      nRBC 0 /100 WBCs      Neutrophils Relative 63 %      Immat GRANS % 1 %      Lymphocytes Relative 23 %      Monocytes Relative 11 %      Eosinophils Relative 2 %      Basophils Relative 0 %      Neutrophils Absolute 7 84 Thousands/µL      Immature Grans Absolute 0 08 Thousand/uL      Lymphocytes Absolute 2 90 Thousands/µL      Monocytes Absolute 1 38 Thousand/µL      Eosinophils Absolute 0 19 Thousand/µL      Basophils Absolute 0 05 Thousands/µL     Lactic acid x2 [[id number]] Collected:  03/04/20 2328    Lab Status: In process Specimen:  Blood from Arm, Left Updated:  03/04/20 2334    Procalcitonin [[id number]] Collected:  03/04/20 2328    Lab Status: In process Specimen:  Blood from Arm, Left Updated:  03/04/20 2334    Blood culture #2 [[id number]] Collected:  03/04/20 2329    Lab Status: In process Specimen:  Blood from Arm, Left Updated:  03/04/20 2334    Lactic acid x2 [[id number]]     Lab Status:  No result Specimen:  Blood                  No orders to display         Procedures  Procedures      ED Course               MDM  Number of Diagnoses or Management Options  Heroin abuse ([facility] [address] ): new and requires workup  Infective endocarditis: new and requires workup  Noncompliance: new and requires workup  Septic pulmonary embolism [facility]): new and requires workup  Diagnosis management comments: 27-year-old male IV drug abuser with known infective endocarditis with septic pulmonary emboli in cavitary lesion presenting for IV antibiotics and admission  Will obtain septic labs for blood cultures and evaluation of current state  Will admit to SOD         Amount and/or Complexity of Data Reviewed  Clinical lab tests: ordered and reviewed  Tests in the medicine section of CPT®: ordered and reviewed  Review and summarize past medical records: yes  Discuss the patient with other providers: yes          Disposition  Final diagnoses:   Infective endocarditis   Septic pulmonary embolism ([facility] [address] )   Heroin abuse ([facility] )   Noncompliance     Time reflects when diagnosis was documented in both MDM as applicable and the Disposition within this note     Time User Action Codes Description Comment    3/4/2020 11:51 PM Warren Louis Add [I33 0] Infective endocarditis     3/4/2020 11:51 PM Warren Louis Add [I26 90] Septic pulmonary embolism ([facility] )     3/4/2020 11:51 PM Warren Louis Add [F71 720,  F19 10] IVDA (intravenous drug abuse) complicating pregnancy ([facility] [address] )     3/4/2020 11:51 PM Warren Louis Remove [N44 402,  F19 10] IVDA (intravenous drug abuse) complicating pregnancy ([facility] )     3/4/2020 11:52 PM Sandy Shearer [F11 10] Heroin abuse ([facility] )     3/4/2020 11:52 PM Sandy Shearer [Z91 19] Noncompliance       ED Disposition     ED Disposition Condition Date/Time Comment    Admit Stable Wed Mar 4, 2020 11:51 PM Case was discussed with SOD and the patient's admission status was agreed to be Admission Status: inpatient status to the service of Dr Karen Merchant   Follow-up Information    None         Patient's Medications    No medications on file     No discharge procedures on file  PDMP Review     None           ED Provider  Attending physically available and evaluated Camacho Eladio NOLASCO managed the patient along with the ED Attending      Electronically Signed by         Naomie Silverman MD  03/04/20 5765

## 2020-03-05 NOTE — QUICK NOTE
Patient sleeping upon arrival to room, opens eyes to speech only and falls back to sleep  HR 82, /60, SPO2 on room air 97%  Received Suboxone 4mg at 1100 am today and ativan 2mg IV at 1152 for agitation/anxiety  Will hold off on any further doses of Suboxone and ativan at this time  Continue to monitor level of sedation and respiratory status  Case discussed with , Dr Ina Richardson and bedside RN staff       RANDY Pelaez  Acute Pain Service

## 2020-03-05 NOTE — ED ATTENDING ATTESTATION
3/4/2020  ICaterina MD, saw and evaluated the patient  I have discussed the patient with the resident/non-physician practitioner and agree with the resident's/non-physician practitioner's findings, Plan of Care, and MDM as documented in the resident's/non-physician practitioner's note, except where noted  All available labs and Radiology studies were reviewed  I was present for key portions of any procedure(s) performed by the resident/non-physician practitioner and I was immediately available to provide assistance  At this point I agree with the current assessment done in the Emergency Department  I have conducted an independent evaluation of this patient a history and physical is as follows:    56-year-old male recently left AMA from outside hospital with septic pulmonary emboli in endocarditis secondary to IVDA    Patient will be admitted for IV antibiotics and MAT therapy    ED Course         Critical Care Time  Procedures

## 2020-03-05 NOTE — UTILIZATION REVIEW
Notification of Inpatient Admission/Inpatient Authorization Request   This is a Notification of Inpatient Admission for [address]  Be advised that this patient was admitted to our facility under Inpatient Status  Contact Shara Martinez at [phone] for additional admission information  [facility]  DEDICATED -[phone]  Patient Name:   Alfredo    YOB: 1990       [address]   [address]:   Daniel Rios  Tax ID: [id number]  NPI: [id number] Attending Provider/NPI: Nishant Mcdaniel Md [[id number]]   Attending Physician:  JENNIFER Jason  Specialty- Internal Medicine,   [address] [id number]  [address]  Phone 1: [phone]  Fax: [phone]   Place of Service Code: 24     Place of Service Name:  [address] [place] [address]   Start Date: 3/4/20 2352     Discharge Date & Time: No discharge date for patient encounter  Type of Admission: Inpatient Status Discharge Disposition (if discharged): Home/Self Care   Patient Diagnoses: Infective endocarditis [I33 0]  Septic pulmonary embolism ([facility] [address] ) [I26 90]  Blood infection ([facility] [address] ) [A41 9]  Noncompliance [Z91 19]  Staphylococcus aureus bacteremia [R78 81]  Heroin abuse ([facility] [address] ) [F11 10]     Orders: Admission Orders (From admission, onward)     Ordered        03/04/20 2352  Inpatient Admission  Once                    Assigned Utilization Review Contact: Jorge Corbin Utilization Review Department  Phone: [phone]; Fax [phone]  Email: [email or web address]   ATTENTION PAYERS: Please call the assigned Utilization  directly with any questions or concerns ALL voicemails in the department are confidential  Send all requests for admission clinical reviews, approved or denied determinations and any other requests to dedicated fax number belonging to the campus where the patient is receiving treatment

## 2020-03-05 NOTE — CONSULTS
Consultation - Infectious Disease   Angeli Hernández 30 y o  male MRN: [id number]  Unit/Bed#: -01 Encounter: [id number]      IMPRESSION & RECOMMENDATIONS:     28-year-old male patient, IV drug use, admitted to [facility] for sepsis secondary to MSSA bacteremia, left AMA, and currently presenting to our hospital for to continue treatment and workup  1- MSSA bacteremia:  Blood culture 03/01/2020 done at [facility] positive in 4 sets to MSSA  Bacteremia is likely complicated by septic pulmonary emboli, concern for infective endocarditis  Source of bacteremia is likely skin, secondary to IV drug use  - stop oxacillin, start cefazolin 2 g IV q 8 hours  - follow-up result of blood culture sent 03/04/2020  - repeat CBC in a m   - close clinical monitoring, trend fever curve    2- septic pulmonary emboli:  Based on CT chest finding done at [facility] 03/01/2020  These findings, with the presence of MSSA bacteremia raise concern for tricuspid valve infective endocarditis despite that MAURA was negative (MAURA was very limited due to technical difficulty)  - continue cefazolin IV as mentioned above  Patient will likely need 6 weeks of IV antibiotic therapy  - monitor respiratory status closely, follow up repeat blood culture sent yesterday 03/04/2020    3- right hand pain:  MRI right hand shows 5th metacarpal fracture, likely secondary to hand trauma    No evidence of abscess or osteomyelitis  - pain control as per primary service    4- IV drug use:  HIV screening done 03/03/2020 is negative  - patient is not candidate for home IV antibiotic therapy    5- chronic hepatitis-C:  As reported by the patient  - outpatient follow-up with GI for workup and treatment    Plan mentioned above discussed with patient  Case discussed with primary service    HISTORY OF PRESENT ILLNESS:  Reason for Consult:  MSSA bacteremia  HPI: Angeli Hernández is a 30y o  year old male with history of IV drug use, admitted 03/04/2020 after leaving [facility] against medical advice  Patient presented to [facility] 03/01/2020 with 2-3 days of fever chills and fatigue  Patient was diagnosed to have MSSA bacteremia  He had TTE which was negative for vegetations, a MAURA which was limited but did not show signs of vegetations  A CT chest abdomen pelvis showed signs of septic pulmonary emboli, splenomegaly without splenic lesions  Patient also had CT of the head done which was negative for acute abnormalities  He reports recent trauma to the right hand and an MRI right hand showed signs of 5th metacarpal fracture without signs of osteomyelitis or abscess  Patient left against medical advice, now he presents to [facility] for further treatment  He denies fever or chills since his discharge from [facility], he denies headache, denies neck pain, denies back pain, denies nausea vomiting or diarrhea, denies arthralgia  Patient reports history of Staph bacteremia about 10 years ago, denies history of endocarditis, denies history of orthopedic hardware placement or cardiac device placement  Patient admits IV drug use  Review of Care everywhere chart confirms blood culture positive 03/01/2024 MSSA  TTE, MAURA, CT scan chest abdomen pelvis, MRI right hand reviewed    REVIEW OF SYSTEMS:  A complete review of systems is negative other than that noted in the HPI      PAST MEDICAL HISTORY:  Past Medical History:   Diagnosis Date    Seizures [facility])      Past Surgical History:   Procedure Laterality Date    APPENDECTOMY         FAMILY HISTORY:  Non-contributory    SOCIAL HISTORY:  Social History   Social History     Substance and Sexual Activity   Alcohol Use Yes    Comment: Daily     Social History     Substance and Sexual Activity   Drug Use Yes    Types: Prescription, Heroin, Cocaine     Social History     Tobacco Use   Smoking Status Current Every Day Smoker    Packs/day: 0 50  Types: Cigarettes   Smokeless Tobacco Never Used       ALLERGIES:  No Known Allergies    MEDICATIONS:  All current active medications have been reviewed  PHYSICAL EXAM:  Temp:  [97 9 °F (36 6 °C)-98 3 °F (36 8 °C)] 97 9 °F (36 6 °C)  HR:  [] 82  Resp:  [16-18] 16  BP: (125-199)/(60-91) 125/60  SpO2:  [96 %-97 %] 97 %  Temp (24hrs), Av °F (36 7 °C), Min:97 9 °F (36 6 °C), Max:98 3 °F (36 8 °C)  Current: Temperature: 97 9 °F (36 6 °C)    Intake/Output Summary (Last 24 hours) at 3/5/2020 1312  Last data filed at 3/5/2020 1240  Gross per 24 hour   Intake 160 ml   Output    Net 160 ml       General Appearance:  Appearing well, nontoxic, and in no distress   Head:  Normocephalic, without obvious abnormality, atraumatic   Eyes:  Conjunctiva pink and sclera anicteric, both eyes   Nose: Nares normal, mucosa normal, no drainage   Throat: Oropharynx moist without lesions   Neck: Supple, symmetrical, no adenopathy, no tenderness/mass/nodules   Back:   Symmetric, no curvature, ROM normal, no CVA tenderness  No point tenderness over cervical, thoracic, lumbar or sacral spine  Lungs:   Clear to auscultation bilaterally, respirations unlabored   Chest Wall:  No tenderness or deformity   Heart:  RRR; no murmur, rub or gallop   Abdomen:   Soft, non-tender, non-distended, positive bowel sounds    Extremities: No cyanosis, clubbing or edema   Skin: No rashes or lesions  No draining wounds noted  Lymph nodes: Cervical, supraclavicular nodes normal   Neurologic: Alert and oriented times 3, extremity strength 5/5 and symmetric       LABS, IMAGING, & OTHER STUDIES:  Lab Results:  I have personally reviewed pertinent labs    Results from last 7 days   Lab Units 20  2328   WBC Thousand/uL 12 44*   HEMOGLOBIN g/dL 15 8   PLATELETS Thousands/uL 278     Results from last 7 days   Lab Units 20  2328   SODIUM mmol/L 142   POTASSIUM mmol/L 3 8   CHLORIDE mmol/L 107   CO2 mmol/L 26   BUN mg/dL 11   CREATININE mg/dL 1 00   EGFR ml/min/1 73sq m 102   CALCIUM mg/dL 8 5   AST U/L 52*   ALT U/L 203*   ALK PHOS U/L 87     Results from last 7 days   Lab Units 03/04/20  2338 03/04/20  2329   BLOOD CULTURE  Received in Microbiology Lab  Culture in Progress  Received in Microbiology Lab  Culture in Progress  Results from last 7 days   Lab Units 03/04/20  2328   PROCALCITONIN ng/ml 0 05       Imaging Studies:   I have personally reviewed pertinent imaging study reports and images in PACS  CT chest done at [facility] 03/01/2020 reported as nodular bilateral infiltrate some with cavitation consistent with septic emboli    Other Studies:   I have personally reviewed pertinent reports    Blood culture done 03/01/2020 at [facility] all positive for MSSA

## 2020-03-05 NOTE — PROGRESS NOTES
Paged by Elmer Mack regarding patient  Patient was reportedly acting agitated and specifically requesting ativan for withdrawal symptoms  RN also noted that patient was briefly visited by a friend in his room, after which time RN noted a $100 bill on the patient's lap  RN stated she felt unsure about patient  I went down to evaluate patient  He was complaining of withdrawal symptoms, primarily anxiety  He was initially mildly upset but was easily redirectable at time of my exam  He stated he was lethargic this morning because his sleep cycle is altered, stating he sleeps in the day time and is awake at night  After our conversation, he agreed to take clonidine  I will start ativan 1mg IV [id number] for him overnight, with understanding between myself and patient that this may be discontinued by morning treatment teams  RN and myself will continue to monitor patient       Christine Boland DO

## 2020-03-05 NOTE — CONSULTS
Consultation - Medical Toxicology  Reagan Moses 30 y o  male MRN: [id number]  Unit/Bed#: ED 23 Encounter: [id number]      Reason for Consult / Principal Problem:  Medication assisted treatment  Inpatient consult to Toxicology  Consult performed by: Anna Hooper DO  Consult ordered by: Herbert Sanabria MD        03/05/20  12:05 a m  ASSESSMENT:  50-year-old male with opioid dependence  1  Opioid dependence  2  Endocarditis  3  Septic pulmonary emboli with cavitary lesion  4  Bacteremia  5  Hepatitis C     RECOMMENDATIONS:  Please admit patient for medical management and continue related care  Regarding patient's opioid use disorder, he is dependent on opioids and last used heroin this morning, after being initially induced with buprenorphine  At this point, he is still mildly intoxicated with heroin and it is not at an appropriate time frame for the resuming buprenorphine, with a current COWS score of 1  Patient assures he will be experience significant withdrawal by 730 in the morning as he does every day  I have ordered a dose Suboxone 4 mg for that time  I will reassess him in the morning to further titrate dose appropriately  I anticipate 8 mg will be sufficient  Patient has been informed of the process of being treated with buprenorphine, has agreed to it here to treatment, and is familiar with the process from prior inductions  For treatment throughout the night, I have ordered clonidine 0 2 mg q 2 hours p r n , lorazepam 2 mg q 2 hours p r n , ondansetron 4 mg q 4 hours p r n , ibuprofen 600 mg q 6 hours p r n , and scheduled gabapentin 300 mg q 8 hours  Patient is suffering from significant constipation at this time and I do not anticipate the need for loperamide  Please include a complete metabolic panel in his reassessment laboratory values    I suspect he will have a mild transaminitis secondary to hepatitis-C and if AST for ALT are greater than 5 times the upper limit of normal, further monitoring of hepatic function will be necessary  For further questions, please call [facility]'s  Service or Patient Access Center to reach the medical  on call  Please see additional teaching note below (if available)    COWS score for opioid withdrawal: [employer] no      HPI: Nick Chaney is a 30y o  year old male who presents with opioid dependency  He has been recently diagnosed with endocarditis, septic pulmonary emboli with cavitary lesions, and admitted to [facility] for long-term antibiotic treatment medication assisted treatment  He reports that he signed out because he was inpatient with treatment and care team responsiveness  Currently, he denies any withdrawal symptoms and feels relatively well  He was induced buprenorphine this morning but then used heroin upon discharge  Review of Systems   Constitutional: Negative for diaphoresis and fever  HENT: Negative  Eyes: Negative  Respiratory: Negative  Gastrointestinal: Positive for constipation  Negative for nausea  Genitourinary: Negative  Musculoskeletal: Negative  Negative for arthralgias and back pain  Neurological: Negative  Negative for tremors  Psychiatric/Behavioral: Negative  Historical Information   Past Medical History:   Diagnosis Date    Seizures [facility])      Past Surgical History:   Procedure Laterality Date    APPENDECTOMY       Social History   Social History     Substance and Sexual Activity   Alcohol Use Yes    Comment: Daily     Social History     Substance and Sexual Activity   Drug Use Yes    Types: Prescription, Heroin, Cocaine     Social History     Tobacco Use   Smoking Status Current Every Day Smoker    Packs/day: 0 50    Types: Cigarettes   Smokeless Tobacco Never Used     No family history on file       Prior to Admission medications    Not on File       Current Facility-Administered Medications Medication Dose Route Frequency    buprenorphine-naloxone (SUBOXONE) 2-0 5 mg per SL film 2 Film  2 Film Sublingual Once    cloNIDine (CATAPRES) tablet 0 2 mg  0 2 mg Oral Q2H PRN    gabapentin (NEURONTIN) capsule 300 mg  300 mg Oral TID    ibuprofen (MOTRIN) tablet 600 mg  600 mg Oral Q6H PRN    LORazepam (ATIVAN) injection 2 mg  2 mg Intravenous Q2H PRN    ondansetron (ZOFRAN) injection 4 mg  4 mg Intravenous Q4H PRN       No Known Allergies    Objective     No intake or output data in the 24 hours ending 03/05/20 0037    Invasive Devices:   Peripheral IV 03/04/20 Left Forearm (Active)   Site Assessment Clean;Dry; Intact 3/4/2020 11:35 PM   Dressing Type Transparent 3/4/2020 11:35 PM   Line Status Blood return noted;Capped;Flushed;Saline locked 3/4/2020 11:35 PM   Dressing Status Clean; Intact;Dry 3/4/2020 11:35 PM       Vitals   Vitals:    03/04/20 2310   BP: (!) 199/91   TempSrc: Oral   Pulse: (!) 110   Resp: 18   Patient Position - Orthostatic VS: Lying   Temp: 98 3 °F (36 8 °C)       Physical Exam   Constitutional: He is oriented to person, place, and time  He appears well-developed and well-nourished  HENT:   Head: Normocephalic and atraumatic  Mouth/Throat: Oropharynx is clear and moist    Eyes: Pupils are equal, round, and reactive to light  Conjunctivae and EOM are normal    Neck: Normal range of motion  Cardiovascular: Regular rhythm  Tachycardia present  Pulmonary/Chest: Effort normal and breath sounds normal    Abdominal: Soft  Bowel sounds are normal  There is no tenderness  Musculoskeletal: Normal range of motion  Neurological: He is alert and oriented to person, place, and time  Skin: Skin is warm and dry  Psychiatric: He has a normal mood and affect  His behavior is normal  Judgment and thought content normal    Nursing note and vitals reviewed  EKG, Pathology, and Other Studies: I have personally reviewed pertinent reports     and Normal sinus rhythm, rate 95 beats per minute, QRS duration 102 milliseconds, QTC interval 424 milliseconds  Rightward axis  Lab Results: I have personally reviewed pertinent reports  Labs:  Results from last 7 days   Lab Units 03/04/20  2328   WBC Thousand/uL 12 44*   HEMOGLOBIN g/dL 15 8   HEMATOCRIT % 45 8   PLATELETS Thousands/uL 278   NEUTROS PCT % 63   LYMPHS PCT % 23   MONOS PCT % 11      Results from last 7 days   Lab Units 03/04/20  2328   SODIUM mmol/L 142   POTASSIUM mmol/L 3 8   CHLORIDE mmol/L 107   CO2 mmol/L 26   BUN mg/dL 11   CREATININE mg/dL 1 00   CALCIUM mg/dL 8 5   ALK PHOS U/L 87   ALT U/L 203*   AST U/L 52*      Results from last 7 days   Lab Units 03/04/20  2328   INR  1 12   PTT seconds 30     Results from last 7 days   Lab Units 03/04/20  2328   LACTIC ACID mmol/L 1 0         Imaging Studies: I have personally reviewed pertinent reports          Minutes of critical care time 45  -Critical care time was exclusive of seperately billable procedures and teaching time    -Critical care was necessary to treat or prevent imminent or life-threatening deterioration of the following condition: cardiac failure, CNS failure/comprimise, hepatic failure, metabolic crisis, sepsis, toxidrome   -Critical care time was spent personally by me on the following activities as well as the above as per the course and rest of chart: obtaining history from patient/surrogate, developement of a treatment plan, discussions with primary provider(s), evaluation of patient's response to the treatment, examination of the patient, recommending treatements and interventions, re-evaluation of the patient's condition, review of old charts, recommending/reviewing laboratory studies, recommending/reviewing of radiographic studies

## 2020-03-05 NOTE — ED PROCEDURE NOTE
PROCEDURE  ECG 12 Lead Documentation Only  Date/Time: 3/4/2020 11:53 PM  Performed by: Torie Godwin MD  Authorized by: Torie Godwin MD     ECG reviewed by me, the ED Provider: yes    Patient location:  ED  Previous ECG:     Previous ECG:  Unavailable    Comparison to cardiac monitor: Yes    Interpretation:     Interpretation: abnormal    Rate:     ECG rate:  95    ECG rate assessment: normal    Rhythm:     Rhythm: sinus rhythm    QRS:     QRS axis:  Right    QRS intervals:  Normal  ST segments:     ST segments:  Normal  T waves:     T waves: normal           Torie Godwin MD  03/04/20 4022

## 2020-03-05 NOTE — ED ATTENDING ATTESTATION
3/4/2020  Ninfa Apgar Nappe, DO, saw and evaluated the patient  I have discussed the patient with the resident/non-physician practitioner and agree with the resident's/non-physician practitioner's findings, Plan of Care, and MDM as documented in the resident's/non-physician practitioner's note, except where noted  All available labs and Radiology studies were reviewed  I was present for key portions of any procedure(s) performed by the resident/non-physician practitioner and I was immediately available to provide assistance  At this point I agree with the current assessment done in the Emergency Department  I have conducted an independent evaluation of this patient a history and physical is as follows:    30 yom with endocarditis and septic pulmonary emboli  He s/o AMA from [facility] after being started on MAT, used heroin and has returned  BP (!) 199/91 (BP Location: Right arm)   Pulse (!) 110   Temp 98 3 °F (36 8 °C) (Oral)   Resp 18   Wt 83 9 kg (185 lb)   SpO2 97%   BMI 28 13 kg/m²   NAD, A&Ox3, lungs clear, tachy, abd soft/NT, ext NROM  Lab evaluation, CXR, antibiotics, imaging on file at [facility] from last 24 hours  Admit         ED Course         Critical Care Time  Procedures

## 2020-03-05 NOTE — UTILIZATION REVIEW
Initial Clinical Review    Admission: Date/Time/Statement: Admission Orders (From admission, onward)     Ordered        03/04/20 2352  Inpatient Admission  Once                   Orders Placed This Encounter   Procedures    Inpatient Admission     Standing Status:   Standing     Number of Occurrences:   1     Order Specific Question:   Admitting Physician     Answer:   Vandana Hendricks [[id number]]     Order Specific Question:   Level of Care     Answer:   Med Surg [[id number]]     Order Specific Question:   Estimated length of stay     Answer:   More than 2 Midnights     Order Specific Question:   Certification     Answer:   I certify that inpatient services are medically necessary for this patient for a duration of greater than two midnights  See H&P and MD Progress Notes for additional information about the patient's course of treatment  ED Arrival Information     Expected Arrival Acuity Means of Arrival Escorted By Service Admission Type    - 3/4/2020 22:55 Emergent Walk-In Self General Medicine Emergency    Arrival Complaint    "blood infection"        Chief Complaint   Patient presents with    Medical Problem     left ama today from [facility], pt was told that he has a broken wrist and that he has small blood clots in his lungs and an infection in his heart  Assessment/Plan:  30 y o male presented to ED from home as inpatient admission for staphylococcus aureus bacteremia  Patient signed out AMA of another hospital where he is dx with septic pulmonary emboli in endocarditis secondary to IVDA   (+) heroin use 03-04-20 (+) right hand pain which he broke weeks ago  Plan consult tox and ID, IV antibiotic,and supportive care  As per tox COW score 1 mild w/d for heroin start on suboxone    ED Triage Vitals [03/04/20 2310]   Temperature Pulse Respirations Blood Pressure SpO2   98 3 °F (36 8 °C) (!) 110 18 (!) 199/91 97 %      Temp Source Heart Rate Source Patient Position - Orthostatic VS BP Location FiO2 (%)   Oral Monitor Lying Right arm --      Pain Score       5        Wt Readings from Last 1 Encounters:   03/04/20 83 9 kg (185 lb)     Additional Vital Signs:   03/05/20 07:16:35    82  16  125/60  82  97 %       03/05/20 01:02:59  97 9 °F (36 6 °C)  92  18  125/75  92  96 %           Pertinent Labs/Diagnostic Test Results:   Results from last 7 days   Lab Units 03/04/20  2328   WBC Thousand/uL 12 44*   HEMOGLOBIN g/dL 15 8   HEMATOCRIT % 45 8   PLATELETS Thousands/uL 278   NEUTROS ABS Thousands/µL 7 84*         Results from last 7 days   Lab Units 03/04/20  2328   SODIUM mmol/L 142   POTASSIUM mmol/L 3 8   CHLORIDE mmol/L 107   CO2 mmol/L 26   ANION GAP mmol/L 9   BUN mg/dL 11   CREATININE mg/dL 1 00   EGFR ml/min/1 73sq m 102   CALCIUM mg/dL 8 5     Results from last 7 days   Lab Units 03/04/20  2328   AST U/L 52*   ALT U/L 203*   ALK PHOS U/L 87   TOTAL PROTEIN g/dL 7 6   ALBUMIN g/dL 3 5   TOTAL BILIRUBIN mg/dL 0 31         Results from last 7 days   Lab Units 03/04/20  2328   GLUCOSE RANDOM mg/dL 98     Results from last 7 days   Lab Units 03/04/20  2328   PROTIME seconds 14 0   INR  1 12   PTT seconds 30         Results from last 7 days   Lab Units 03/04/20  2328   PROCALCITONIN ng/ml 0 05     Results from last 7 days   Lab Units 03/05/20  0245 03/04/20  2328   LACTIC ACID mmol/L 1 0 1 0     Results from last 7 days   Lab Units 03/04/20  2334   CLARITY UA  Clear   COLOR UA  Yellow   SPEC GRAV UA  1 021   PH UA  5 5   GLUCOSE UA mg/dl Negative   KETONES UA mg/dl Negative   BLOOD UA  Negative   PROTEIN UA mg/dl Negative   NITRITE UA  Negative   BILIRUBIN UA  Negative   UROBILINOGEN UA E U /dl 0 2   LEUKOCYTES UA  Negative     Results from last 7 days   Lab Units 03/04/20  2338 03/04/20  2329   BLOOD CULTURE  Received in Microbiology Lab  Culture in Progress  Received in Microbiology Lab  Culture in Progress       EKG 03-04-20    ECG rate:  95    ECG rate assessment: normal    Rhythm:     Rhythm: sinus rhythm    QRS:     QRS axis:  Right    QRS intervals:  Normal  ST segments:     ST segments:  Normal  T waves:     T waves: normal          ED Treatment:   Medication Administration from 03/04/2020 2254 to 03/05/2020 0048     None        Past Medical History:   Diagnosis Date    Seizures ([facility] )      Present on Admission:   Staphylococcus aureus bacteremia   Opioid use disorder ([place] [address] )   Acute septic pulmonary embolism ([place] [address] )      Admitting Diagnosis: Infective endocarditis [I33 0]  Septic pulmonary embolism (HCC) [I26 90]  Blood infection ([place] [address] ) [A41 9]  Noncompliance [Z91 19]  Staphylococcus aureus bacteremia [R78 81]  Heroin abuse ([place] [address] ) [F11 10]  Age/Sex: 30 y o  male  Admission Orders:  Scheduled Medications:    Medications:  gabapentin 300 mg Oral TID   nicotine 7 mg Transdermal Daily   oxacillin 2,000 mg Intravenous Q4H     Continuous IV Infusions:     PRN Meds:    cloNIDine 0 2 mg Oral Q2H PRN   ibuprofen 600 mg Oral Q6H PRN   LORazepam 2 mg Intravenous Q2H PRN   ondansetron 4 mg Intravenous Q4H PRN       IP CONSULT TO TOXICOLOGY  IP CONSULT TO INFECTIOUS DISEASES    Network Utilization Review Department  [email or web address]  ATTENTION: Please call with any questions or concerns to [phone] and carefully listen to the prompts so that you are directed to the right person  All voicemails are confidential   Maricel Geronimo all requests for admission clinical reviews, approved or denied determinations and any other requests to dedicated fax number below belonging to the campus where the patient is receiving treatment   List of dedicated fax numbers for the Facilities:  FACILITY NAME UR FAX NUMBER   ADMISSION DENIALS (Administrative/Medical Necessity) [phone]   [address] [place] [address] (Maternity/NICU/Pediatrics) [phone]   [facility] [phone]   Carlena Purchase [address] [facility] [place] [id number] [facility] [phone]   [facility]  [phone] [address] [facility]  [phone] [address] Mayra Reid [phone]

## 2020-03-06 PROBLEM — R51.9 HEADACHE: Status: RESOLVED | Noted: 2020-03-05 | Resolved: 2020-03-06

## 2020-03-06 LAB
ANION GAP SERPL CALCULATED.3IONS-SCNC: 7 MMOL/L (ref 4–13)
BASOPHILS # BLD AUTO: 0.07 THOUSANDS/ΜL (ref 0–0.1)
BASOPHILS NFR BLD AUTO: 1 % (ref 0–1)
BUN SERPL-MCNC: 19 MG/DL (ref 5–25)
CALCIUM SERPL-MCNC: 8.6 MG/DL (ref 8.3–10.1)
CHLORIDE SERPL-SCNC: 106 MMOL/L (ref 100–108)
CO2 SERPL-SCNC: 28 MMOL/L (ref 21–32)
CREAT SERPL-MCNC: 1.2 MG/DL (ref 0.6–1.3)
EOSINOPHIL # BLD AUTO: 0.4 THOUSAND/ΜL (ref 0–0.61)
EOSINOPHIL NFR BLD AUTO: 4 % (ref 0–6)
ERYTHROCYTE [DISTWIDTH] IN BLOOD BY AUTOMATED COUNT: 11.8 % (ref 11.6–15.1)
GFR SERPL CREATININE-BSD FRML MDRD: 82 ML/MIN/1.73SQ M
GLUCOSE SERPL-MCNC: 94 MG/DL (ref 65–140)
HCT VFR BLD AUTO: 44.9 % (ref 36.5–49.3)
HGB BLD-MCNC: 15.5 G/DL (ref 12–17)
IMM GRANULOCYTES # BLD AUTO: 0.12 THOUSAND/UL (ref 0–0.2)
IMM GRANULOCYTES NFR BLD AUTO: 1 % (ref 0–2)
LYMPHOCYTES # BLD AUTO: 2.57 THOUSANDS/ΜL (ref 0.6–4.47)
LYMPHOCYTES NFR BLD AUTO: 23 % (ref 14–44)
MCH RBC QN AUTO: 29.9 PG (ref 26.8–34.3)
MCHC RBC AUTO-ENTMCNC: 34.5 G/DL (ref 31.4–37.4)
MCV RBC AUTO: 87 FL (ref 82–98)
MONOCYTES # BLD AUTO: 1.18 THOUSAND/ΜL (ref 0.17–1.22)
MONOCYTES NFR BLD AUTO: 11 % (ref 4–12)
NEUTROPHILS # BLD AUTO: 6.89 THOUSANDS/ΜL (ref 1.85–7.62)
NEUTS SEG NFR BLD AUTO: 60 % (ref 43–75)
NRBC BLD AUTO-RTO: 0 /100 WBCS
PLATELET # BLD AUTO: 281 THOUSANDS/UL (ref 149–390)
PMV BLD AUTO: 9 FL (ref 8.9–12.7)
POTASSIUM SERPL-SCNC: 3.7 MMOL/L (ref 3.5–5.3)
RBC # BLD AUTO: 5.18 MILLION/UL (ref 3.88–5.62)
SODIUM SERPL-SCNC: 141 MMOL/L (ref 136–145)
WBC # BLD AUTO: 11.23 THOUSAND/UL (ref 4.31–10.16)

## 2020-03-06 PROCEDURE — 80048 BASIC METABOLIC PNL TOTAL CA: CPT | Performed by: INTERNAL MEDICINE

## 2020-03-06 PROCEDURE — 85025 COMPLETE CBC W/AUTO DIFF WBC: CPT | Performed by: INTERNAL MEDICINE

## 2020-03-06 PROCEDURE — 99233 SBSQ HOSP IP/OBS HIGH 50: CPT | Performed by: INTERNAL MEDICINE

## 2020-03-06 RX ORDER — DIAZEPAM 5 MG/ML
5 INJECTION, SOLUTION INTRAMUSCULAR; INTRAVENOUS EVERY 6 HOURS PRN
Status: DISCONTINUED | OUTPATIENT
Start: 2020-03-06 | End: 2020-03-07 | Stop reason: HOSPADM

## 2020-03-06 RX ORDER — POTASSIUM CHLORIDE 20 MEQ/1
40 TABLET, EXTENDED RELEASE ORAL ONCE
Status: COMPLETED | OUTPATIENT
Start: 2020-03-06 | End: 2020-03-06

## 2020-03-06 RX ORDER — LOPERAMIDE HYDROCHLORIDE 2 MG/1
4 CAPSULE ORAL 4 TIMES DAILY PRN
Status: DISCONTINUED | OUTPATIENT
Start: 2020-03-06 | End: 2020-03-07 | Stop reason: HOSPADM

## 2020-03-06 RX ORDER — SODIUM CHLORIDE, SODIUM LACTATE, POTASSIUM CHLORIDE, CALCIUM CHLORIDE 600; 310; 30; 20 MG/100ML; MG/100ML; MG/100ML; MG/100ML
75 INJECTION, SOLUTION INTRAVENOUS CONTINUOUS
Status: DISPENSED | OUTPATIENT
Start: 2020-03-06 | End: 2020-03-07

## 2020-03-06 RX ADMIN — CEFAZOLIN SODIUM 2000 MG: 10 INJECTION, POWDER, FOR SOLUTION INTRAVENOUS at 21:40

## 2020-03-06 RX ADMIN — DIAZEPAM 5 MG: 10 INJECTION, SOLUTION INTRAMUSCULAR; INTRAVENOUS at 22:58

## 2020-03-06 RX ADMIN — CEFAZOLIN SODIUM 2000 MG: 10 INJECTION, POWDER, FOR SOLUTION INTRAVENOUS at 00:14

## 2020-03-06 RX ADMIN — LORAZEPAM 1 MG: 2 INJECTION INTRAMUSCULAR; INTRAVENOUS at 03:10

## 2020-03-06 RX ADMIN — GABAPENTIN 300 MG: 300 CAPSULE ORAL at 22:57

## 2020-03-06 RX ADMIN — POTASSIUM CHLORIDE 40 MEQ: 1500 TABLET, EXTENDED RELEASE ORAL at 10:52

## 2020-03-06 RX ADMIN — CEFAZOLIN SODIUM 2000 MG: 10 INJECTION, POWDER, FOR SOLUTION INTRAVENOUS at 11:22

## 2020-03-06 RX ADMIN — GABAPENTIN 300 MG: 300 CAPSULE ORAL at 10:53

## 2020-03-06 RX ADMIN — NICOTINE 7 MG: 7 PATCH TRANSDERMAL at 10:53

## 2020-03-06 NOTE — ASSESSMENT & PLAN NOTE
He abuses heroin IV and cocaine  Advised about consequence of drug abuse on recommendation to seek rehab he started Suboxone in the past   Appreciate medical Toxicology input regarding Suboxone already received 1 dose  Clonidine 0 3 mg p r n  Q 3 for opioid withdrawal  Loperamide 4 mg p r n  4 times a day for diarrhea  Diazepam 5 mg IV p r n  Q 6 for an anxiety, agitation, and insomnia

## 2020-03-06 NOTE — ASSESSMENT & PLAN NOTE
Active present on admission  Was in the process to get treatment however did not start treatment at    HCV RNA elevated more than 53,000 and RNA log more than 4 3  HIV negative recent

## 2020-03-06 NOTE — ASSESSMENT & PLAN NOTE
Present on admission  Evidence on CT chest at the [facility] few days prior to admission  Septic emboli noted on CT chest  Franki was limited however did not show vegetation and TTE did not show vegetation at [facility]

## 2020-03-06 NOTE — ASSESSMENT & PLAN NOTE
Present on admission  Due to IV drug abuse he admitted to injecting her went through IV antecubital veins right side  Blood culture positive for MSSA at [facility] in 4 sets March 1st  Patient remains afebrile  Infectious disease consulted recommended minimum of 6 weeks of IV antibiotic while inpatient no PICC line  If patient decided to leave the hospital it will be against medical advice as he did in prior hospital few days ago  Blood culture negative for 24 hour preliminary  Once blood culture -72 hours can be discharged per ID with planned to follow-up with outpatient IV antibiotics through daily insertion of peripheral IV at the infusion center through April 15 /20  To complete 6 weeks antibiotic  If that fails he can be on oral linezolid versus  Dalbavancin IV     IV cephazolin 2 g Q 8 day 2  Oxacillin # 2

## 2020-03-06 NOTE — PROGRESS NOTES
Progress Note - Infectious Disease   Kamran Velez 30 y o  male MRN: [id number]  Unit/Bed#: -01 Encounter: [id number]      Impression/Plan:    1- MSSA bacteremia:  Blood culture 03/01/2020 done at [facility] positive in 4 sets to MSSA  Bacteremia is likely complicated by septic pulmonary emboli, concern for infective endocarditis  Source of bacteremia is likely skin, secondary to IV drug use  -  continue cefazolin 2 g IV q 8 hours  - follow-up result of blood culture sent 03/04/2020  - repeat CBC in a m   - close clinical monitoring, trend fever curve     2- Septic pulmonary emboli:  Based on CT chest finding done at [facility] 03/01/2020  These findings, with the presence of MSSA bacteremia raise concern for tricuspid valve infective endocarditis despite that MAURA was negative (MAURA was very limited due to technical difficulty)  - continue cefazolin IV as mentioned above  Patient will need 6 weeks of IV antibiotic therapy  - monitor respiratory status closely, follow up repeat blood culture sent 03/04/2020  - patient is not candidate for home IV antibiotic therapy through PICC due to history of IV drug use  Patient is not interested in inpatient drug rehab  Patient insists on leaving the hospital, otherwise he would lose his job and his only source of income  Once blood cultures negative at 72 hours, okay for discharge from ID standpoint  Patient will need to go to [facility] infusion center daily, to get daptomycin IV through 04/15/2020  Patient understands that he will be getting peripheral IV line insertion daily at the infusion center  Patient understands that this plan might not work for the full 6 weeks  Patient understands the risks  And understands that if outpatient IV antibiotic therapy at infusion center fails, other options include linezolid p o  Or Dalbavancin IV, each with the known side effects and limitations    - weekly blood work including CBC, creatinine, CK level while on antibiotic therapy       3- Right hand pain:  MRI right hand shows 5th metacarpal fracture, likely secondary to hand trauma  No evidence of abscess or osteomyelitis  - pain control as per primary service     4- IV drug use:  HIV screening done 2020 is negative  - patient is not candidate for home IV antibiotic therapy     5- chronic hepatitis-C:  As reported by the patient  - outpatient follow-up with GI for workup and treatment     Plan mentioned above discussed with patient  Case discussed with primary service    Antibiotics  Cefazolin day 3    Subjective:  Patient has no fever, chills, sweats; no nausea, vomiting, diarrhea; no cough, shortness of breath; no pain  No new symptoms  Objective:  Vitals:  Temp:  [97 7 °F (36 5 °C)-98 2 °F (36 8 °C)] 98 1 °F (36 7 °C)  HR:  [81-90] 81  Resp:  [16-19] 16  BP: (137-151)/(61-84) 143/61  SpO2:  [93 %-95 %] 95 %  Temp (24hrs), Av °F (36 7 °C), Min:97 7 °F (36 5 °C), Max:98 2 °F (36 8 °C)  Current: Temperature: 98 1 °F (36 7 °C)    Physical Exam:   General Appearance:  Alert, interactive, nontoxic, no acute distress  Throat: Oropharynx moist without lesions  Lungs:   Clear to auscultation bilaterally; no wheezes, rhonchi or rales; respirations unlabored   Heart:  RRR; no murmur, rub or gallop   Abdomen:   Soft, non-tender, non-distended, positive bowel sounds  Extremities: No clubbing, cyanosis or edema   Skin: No new rashes or lesions  No draining wounds noted         Labs, Imaging, & Other studies:   All pertinent labs and imaging studies were personally reviewed  Results from last 7 days   Lab Units 20  0503 20  2328   WBC Thousand/uL 11 23* 12 44*   HEMOGLOBIN g/dL 15 5 15 8   PLATELETS Thousands/uL 281 278     Results from last 7 days   Lab Units 20  0503 20  2328   SODIUM mmol/L 141 142   POTASSIUM mmol/L 3 7 3 8   CHLORIDE mmol/L 106 107   CO2 mmol/L 28 26   BUN mg/dL 19 11   CREATININE mg/dL 1 20 1 00 EGFR ml/min/1 73sq m 82 102   CALCIUM mg/dL 8 6 8 5   AST U/L  --  52*   ALT U/L  --  203*   ALK PHOS U/L  --  87     Results from last 7 days   Lab Units 03/04/20  2338 03/04/20  2329   BLOOD CULTURE  No Growth at 24 hrs  No Growth at 24 hrs       Results from last 7 days   Lab Units 03/04/20  2328   PROCALCITONIN ng/ml 0 05

## 2020-03-06 NOTE — PROGRESS NOTES
INTERNAL MEDICINE RESIDENCY PROGRESS NOTE     Name: Reagan Moses   Age & Sex: 30 y o  male   MRN: [id number]  Unit/Bed#: -01   Encounter: [id number]  Team: SOD Team B     PATIENT INFORMATION     Name: Reagan Moses   Age & Sex: 30 y o  male   MRN: [id number]  Hospital Stay Days: 2    ASSESSMENT/PLAN     Principal Problem:    Staphylococcus aureus bacteremia  Active Problems:    Opioid use disorder ([facility] [address] )    Acute septic pulmonary embolism ([facility] )    Hepatitis C    Transaminitis    Nicotine dependence      * Staphylococcus aureus bacteremia  Assessment & Plan  Present on admission  Due to IV drug abuse he admitted to injecting her went through IV antecubital veins right side  Blood culture positive for MSSA at [facility] in 4 sets March 1st  Patient remains afebrile  Infectious disease consulted recommended minimum of 4 weeks of IV antibiotic while inpatient no PICC line  If patient decided to leave the hospital it will be against medical advice as he did in prior hospital few days ago  Blood culture negative for 24 hour preliminary  IV cephazolin 2 g Q 8 day 2  Continue IV lactated Ringer's for today and discontinue afterwards  Oxacillin # 2    Nicotine dependence  Assessment & Plan  Nicotine patch    Transaminitis  Assessment & Plan  Likely secondary to viral hepatitis C  Monitor clinical exams    Hepatitis C  Assessment & Plan  Active present on admission  Was in the process to get treatment however did not start treatment at    HCV RNA elevated more than 53,000 and RNA log more than 4 3  HIV negative recent    Acute septic pulmonary embolism [facility])  Assessment & Plan  Present on admission  Evidence on CT chest at the [facility] few days prior to admission  Septic emboli noted on CT chest  Franki was limited however did not show vegetation and TTE did not show vegetation at [facility]     Opioid use disorder [facility])  Assessment & Plan  He abuses heroin IV and cocaine  Advised about consequence of drug abuse on recommendation to seek rehab he started Suboxone in the past   Appreciate medical Toxicology input regarding Suboxone already received 1 dose  Clonidine 0 3 mg p r n  Q 3 for opioid withdrawal  Loperamide 4 mg p r n  4 times a day for diarrhea  Diazepam 5 mg IV p r n  Q 6 for an anxiety, agitation, and insomnia      Disposition:  Continue inpatient treatment for IV antibiotics duration of minimum 4 weeks no PICC line per ID given active IV drug abuse  SUBJECTIVE     Patient seen and examined  No acute events overnight  He feeling better  He intermittently experience withdrawal such as tremors and on anxiety  He was counseled extensively to stay for the total duration of antibiotic he reported he will leave once the final blood culture reported no growth  Requested shower  OBJECTIVE     Vitals:    20 2343 20 0307 20 0602 20 0709   BP: 137/84 151/73  143/61   BP Location:    Right arm   Pulse: 84 90  81   Resp: 18 19  16   Temp: 97 7 °F (36 5 °C) 98 2 °F (36 8 °C)  98 1 °F (36 7 °C)   TempSrc:    Oral   SpO2: 95% 93%  95%   Weight:   80 3 kg (177 lb 0 5 oz)       Temperature:   Temp (24hrs), Av °F (36 7 °C), Min:97 7 °F (36 5 °C), Max:98 2 °F (36 8 °C)    Temperature: 98 1 °F (36 7 °C)  Intake & Output:  I/O       / 0701 - / 0700  07 -  0700 / 07 -  0700    P  O   160     Total Intake(mL/kg)  160 (2)     Net  +160                Weights:   IBW: -88 kg    Body mass index is 26 92 kg/m²  Weight (last 2 days)     Date/Time   Weight    20 0602   80 3 (177 03)    20 2310   83 9 (185)            Physical Exam   Constitutional: No distress  HENT:   Head: Normocephalic and atraumatic  Cardiovascular: Normal rate, regular rhythm and normal heart sounds  Exam reveals no friction rub  No murmur heard  Pulmonary/Chest: Effort normal and breath sounds normal  No stridor   No respiratory distress  He has no wheezes  He has no rales  Abdominal: Soft  Bowel sounds are normal  He exhibits no distension and no mass  There is no tenderness  There is no guarding  Musculoskeletal: He exhibits no deformity  Right antecubital track marks   Skin: Skin is warm and dry  He is not diaphoretic  Multiple tattoos throughout the body   Psychiatric: He has a normal mood and affect  His behavior is normal    Vitals reviewed  LABORATORY DATA     Labs: I have personally reviewed pertinent reports  Results from last 7 days   Lab Units 03/06/20  0503 03/04/20  2328   WBC Thousand/uL 11 23* 12 44*   HEMOGLOBIN g/dL 15 5 15 8   HEMATOCRIT % 44 9 45 8   PLATELETS Thousands/uL 281 278   NEUTROS PCT % 60 63   MONOS PCT % 11 11      Results from last 7 days   Lab Units 03/06/20  0503 03/04/20  2328   POTASSIUM mmol/L 3 7 3 8   CHLORIDE mmol/L 106 107   CO2 mmol/L 28 26   BUN mg/dL 19 11   CREATININE mg/dL 1 20 1 00   CALCIUM mg/dL 8 6 8 5   ALK PHOS U/L  --  87   ALT U/L  --  203*   AST U/L  --  52*              Results from last 7 days   Lab Units 03/04/20  2328   INR  1 12   PTT seconds 30     Results from last 7 days   Lab Units 03/05/20  0245   LACTIC ACID mmol/L 1 0           IMAGING & DIAGNOSTIC TESTING     Radiology Results: I have personally reviewed pertinent reports  No results found  Other Diagnostic Testing: I have personally reviewed pertinent reports      ACTIVE MEDICATIONS     Current Facility-Administered Medications   Medication Dose Route Frequency    ceFAZolin (ANCEF) 2,000 mg in sodium chloride 0 9 % 50 mL IVPB  2,000 mg Intravenous Q8H    cloNIDine (CATAPRES) tablet 0 3 mg  0 3 mg Oral Q2H PRN    diazepam (VALIUM) injection 5 mg  5 mg Intravenous Q6H PRN    gabapentin (NEURONTIN) capsule 300 mg  300 mg Oral TID    ibuprofen (MOTRIN) tablet 600 mg  600 mg Oral Q6H PRN    lactated ringers infusion  75 mL/hr Intravenous Continuous    loperamide (IMODIUM) capsule 4 mg  4 mg Oral 4x Daily PRN  nicotine (NICODERM CQ) 7 mg/24hr TD 24 hr patch 7 mg  7 mg Transdermal Daily       VTE Pharmacologic Prophylaxis:  Low risk  VTE Mechanical Prophylaxis: sequential compression device    Portions of the record may have been created with voice recognition software  Occasional wrong word or "sound a like" substitutions may have occurred due to the inherent limitations of voice recognition software    Read the chart carefully and recognize, using context, where substitutions have occurred   ==  Zandra Sandoval MD  [address]  Internal Medicine Residency PGY-3

## 2020-03-06 NOTE — PLAN OF CARE
Problem: PAIN - ADULT  Goal: Verbalizes/displays adequate comfort level or baseline comfort level  Description  Interventions:  - Encourage patient to monitor pain and request assistance  - Assess pain using appropriate pain scale  - Administer analgesics based on type and severity of pain and evaluate response  - Implement non-pharmacological measures as appropriate and evaluate response  - Consider cultural and social influences on pain and pain management  - Notify physician/advanced practitioner if interventions unsuccessful or patient reports new pain  Outcome: Progressing     Problem: INFECTION - ADULT  Goal: Absence or prevention of progression during hospitalization  Description  INTERVENTIONS:  - Assess and monitor for signs and symptoms of infection  - Monitor lab/diagnostic results  - Monitor all insertion sites, i e  indwelling lines, tubes, and drains  - Monitor endotracheal if appropriate and nasal secretions for changes in amount and color  - [place] appropriate cooling/warming therapies per order  - Administer medications as ordered  - Instruct and encourage patient and family to use good hand hygiene technique  - Identify and instruct in appropriate isolation precautions for identified infection/condition  Outcome: Progressing  Goal: Absence of fever/infection during neutropenic period  Description  INTERVENTIONS:  - Monitor WBC    Outcome: Progressing     Problem: SAFETY ADULT  Goal: Patient will remain free of falls  Description  INTERVENTIONS:  - Assess patient frequently for physical needs  -  Identify cognitive and physical deficits and behaviors that affect risk of falls    -  [place] fall precautions as indicated by assessment   - Educate patient/family on patient safety including physical limitations  - Instruct patient to call for assistance with activity based on assessment  - Modify environment to reduce risk of injury  - Consider OT/PT consult to assist with strengthening/mobility  Outcome: Progressing  Goal: Maintain or return to baseline ADL function  Description  INTERVENTIONS:  -  Assess patient's ability to carry out ADLs; assess patient's baseline for ADL function and identify physical deficits which impact ability to perform ADLs (bathing, care of mouth/teeth, toileting, grooming, dressing, etc )  - Assess/evaluate cause of self-care deficits   - Assess range of motion  - Assess patient's mobility; develop plan if impaired  - Assess patient's need for assistive devices and provide as appropriate  - Encourage maximum independence but intervene and supervise when necessary  - Involve family in performance of ADLs  - Assess for home care needs following discharge   - Consider OT consult to assist with ADL evaluation and planning for discharge  - Provide patient education as appropriate  Outcome: Progressing  Goal: Maintain or return mobility status to optimal level  Description  INTERVENTIONS:  - Assess patient's baseline mobility status (ambulation, transfers, stairs, etc )    - Identify cognitive and physical deficits and behaviors that affect mobility  - Identify mobility aids required to assist with transfers and/or ambulation (gait belt, sit-to-stand, lift, walker, cane, etc )  - [place] fall precautions as indicated by assessment  - Record patient progress and toleration of activity level on Mobility SBAR; progress patient to next Phase/Stage  - Instruct patient to call for assistance with activity based on assessment  - Consider rehabilitation consult to assist with strengthening/weightbearing, etc   Outcome: Progressing     Problem: DISCHARGE PLANNING  Goal: Discharge to home or other facility with appropriate resources  Description  INTERVENTIONS:  - Identify barriers to discharge w/patient and caregiver  - Arrange for needed discharge resources and transportation as appropriate  - Identify discharge learning needs (meds, wound care, etc )  - Arrange for interpretive services to assist at discharge as needed  - Refer to Case Management Department for coordinating discharge planning if the patient needs post-hospital services based on physician/advanced practitioner order or complex needs related to functional status, cognitive ability, or social support system  Outcome: Progressing     Problem: Knowledge Deficit  Goal: Patient/family/caregiver demonstrates understanding of disease process, treatment plan, medications, and discharge instructions  Description  Complete learning assessment and assess knowledge base    Interventions:  - Provide teaching at level of understanding  - Provide teaching via preferred learning methods  Outcome: Progressing

## 2020-03-07 VITALS
TEMPERATURE: 98.6 F | SYSTOLIC BLOOD PRESSURE: 147 MMHG | HEART RATE: 101 BPM | DIASTOLIC BLOOD PRESSURE: 89 MMHG | BODY MASS INDEX: 26.92 KG/M2 | RESPIRATION RATE: 16 BRPM | OXYGEN SATURATION: 96 % | WEIGHT: 177.03 LBS

## 2020-03-07 LAB
BASOPHILS # BLD AUTO: 0.06 THOUSANDS/ΜL (ref 0–0.1)
BASOPHILS NFR BLD AUTO: 1 % (ref 0–1)
EOSINOPHIL # BLD AUTO: 0.4 THOUSAND/ΜL (ref 0–0.61)
EOSINOPHIL NFR BLD AUTO: 4 % (ref 0–6)
ERYTHROCYTE [DISTWIDTH] IN BLOOD BY AUTOMATED COUNT: 11.8 % (ref 11.6–15.1)
HCT VFR BLD AUTO: 52.2 % (ref 36.5–49.3)
HGB BLD-MCNC: 17.7 G/DL (ref 12–17)
IMM GRANULOCYTES # BLD AUTO: 0.12 THOUSAND/UL (ref 0–0.2)
IMM GRANULOCYTES NFR BLD AUTO: 1 % (ref 0–2)
LYMPHOCYTES # BLD AUTO: 2.74 THOUSANDS/ΜL (ref 0.6–4.47)
LYMPHOCYTES NFR BLD AUTO: 25 % (ref 14–44)
MCH RBC QN AUTO: 29.6 PG (ref 26.8–34.3)
MCHC RBC AUTO-ENTMCNC: 33.9 G/DL (ref 31.4–37.4)
MCV RBC AUTO: 87 FL (ref 82–98)
MONOCYTES # BLD AUTO: 1.16 THOUSAND/ΜL (ref 0.17–1.22)
MONOCYTES NFR BLD AUTO: 10 % (ref 4–12)
NEUTROPHILS # BLD AUTO: 6.68 THOUSANDS/ΜL (ref 1.85–7.62)
NEUTS SEG NFR BLD AUTO: 59 % (ref 43–75)
NRBC BLD AUTO-RTO: 0 /100 WBCS
PLATELET # BLD AUTO: 334 THOUSANDS/UL (ref 149–390)
PMV BLD AUTO: 9.4 FL (ref 8.9–12.7)
RBC # BLD AUTO: 5.97 MILLION/UL (ref 3.88–5.62)
WBC # BLD AUTO: 11.16 THOUSAND/UL (ref 4.31–10.16)

## 2020-03-07 PROCEDURE — 85025 COMPLETE CBC W/AUTO DIFF WBC: CPT | Performed by: INTERNAL MEDICINE

## 2020-03-07 RX ORDER — GABAPENTIN 300 MG/1
300 CAPSULE ORAL 3 TIMES DAILY
Qty: 2 CAPSULE | Refills: 0 | Status: SHIPPED | OUTPATIENT
Start: 2020-03-07 | End: 2020-03-18 | Stop reason: SDDI

## 2020-03-07 RX ADMIN — CEFAZOLIN SODIUM 2000 MG: 10 INJECTION, POWDER, FOR SOLUTION INTRAVENOUS at 12:25

## 2020-03-07 RX ADMIN — NICOTINE 7 MG: 7 PATCH TRANSDERMAL at 09:25

## 2020-03-07 RX ADMIN — CEFAZOLIN SODIUM 2000 MG: 10 INJECTION, POWDER, FOR SOLUTION INTRAVENOUS at 05:48

## 2020-03-07 RX ADMIN — GABAPENTIN 300 MG: 300 CAPSULE ORAL at 09:25

## 2020-03-07 RX ADMIN — LOPERAMIDE HYDROCHLORIDE 4 MG: 2 CAPSULE ORAL at 09:25

## 2020-03-07 NOTE — SOCIAL WORK
CM spoke with Darnell Garcia at [facility]  Darnell Garcia stated that [facility] would be a closer infusion center to [facility] phone ([phone]) however they are closed today  CM to verify with pt on Monday if he's going to use [facility] or [facility]  CM to make appropriate OP Infusion center referral on Monday  Orders are in [facility] for EMILY [place] can pull them from [facility] if needed

## 2020-03-07 NOTE — PROGRESS NOTES
INTERNAL MEDICINE RESIDENCY PROGRESS NOTE     Name: Merrill Ramirez   Age & Sex: 30 y o  male   MRN: [id number]  Unit/Bed#: -01   Encounter: [id number]  Team: SOD Team B     PATIENT INFORMATION     Name: Merrill Ramirez   Age & Sex: 30 y o  male   MRN: [id number]  Hospital Stay Days: 3    ASSESSMENT/PLAN     Principal Problem:    Staphylococcus aureus bacteremia  Active Problems:    Opioid use disorder ([facility] [address] )    [facility] )    Hepatitis C    Transaminitis    Nicotine dependence      * Staphylococcus aureus bacteremia  Assessment & Plan  Present on admission  Due to IV drug abuse he admitted to injecting her went through IV antecubital veins right side  Blood culture positive for MSSA at [facility] in 4 sets March 1st  Patient remains afebrile  Infectious disease consulted recommended minimum of 6 weeks of IV antibiotic while inpatient no PICC line  If patient decided to leave the hospital it will be against medical advice as he did in prior hospital few days ago  Blood culture negative for 24 hour preliminary  Once blood culture -72 hours can be discharged per ID with planned to follow-up with outpatient IV antibiotics through daily insertion of peripheral IV at the infusion center through April 15 /20  To complete 6 weeks antibiotic  If that fails he can be on oral linezolid versus  Dalbavancin IV  IV cephazolin 2 g Q 8 day 2  Oxacillin # 2    Nicotine dependence  Assessment & Plan  Nicotine patch    Transaminitis  Assessment & Plan  Likely secondary to viral hepatitis C  Monitor clinical exams    Hepatitis C  Assessment & Plan  Active present on admission  Was in the process to get treatment however did not start treatment at    HCV RNA elevated more than 53,000 and RNA log more than 4 3  HIV negative recent    Acute septic pulmonary embolism [facility])  Assessment & Plan  Present on admission  Evidence on CT chest at the [facility] few days prior to admission  Septic emboli noted on CT chest  Franki was limited however did not show vegetation and TTE did not show vegetation at [facility]     Opioid use disorder [facility])  Assessment & Plan  He abuses heroin IV and cocaine  Advised about consequence of drug abuse on recommendation to seek rehab he started Suboxone in the past   Appreciate medical Toxicology input regarding Suboxone already received 1 dose  Clonidine 0 3 mg p r n  Q 3 for opioid withdrawal  Loperamide 4 mg p r n  4 times a day for diarrhea  Diazepam 5 mg IV p r n  Q 6 for an anxiety, agitation, and insomnia      Disposition:  Discharge home once blood culture -72 hours with outpatient IV antibiotic at the infusion center through daily new insertion of peripheral IV  SUBJECTIVE     Patient seen and examined  No acute events overnight  He denied active nausea, fever or chills  OBJECTIVE     Vitals:    20 0709 20 1543 20 2100 20 2218   BP: 143/61 141/69  119/61   BP Location: Right arm      Pulse: 81   101   Resp: 16 16  16   Temp: 98 1 °F (36 7 °C) 97 9 °F (36 6 °C)     TempSrc: Oral      SpO2: 95%  96% 96%   Weight:          Temperature:   Temp (24hrs), Av 9 °F (36 6 °C), Min:97 9 °F (36 6 °C), Max:97 9 °F (36 6 °C)    Temperature: 97 9 °F (36 6 °C)  Intake & Output:  I/O       / 0701 - / 0700 / 0701 -  0700 / 0701 - / 0700    P  O  160 380     Total Intake(mL/kg) 160 (2) 380 (4 7)     Net +160 +380                Weights:   IBW: -88 kg    Body mass index is 26 92 kg/m²  Weight (last 2 days)     Date/Time   Weight    20 0602   80 3 (177 03)            Physical Exam   Constitutional: He is oriented to person, place, and time  No distress  HENT:   Head: Normocephalic and atraumatic  Mouth/Throat: No oropharyngeal exudate  Neck: Neck supple  Cardiovascular: Normal rate, regular rhythm and normal heart sounds  Exam reveals no friction rub     No murmur heard   Pulmonary/Chest: Effort normal and breath sounds normal  No stridor  No respiratory distress  He has no wheezes  He has no rales  Abdominal: Soft  Bowel sounds are normal  He exhibits no distension and no mass  There is no tenderness  There is no guarding  Musculoskeletal: He exhibits no edema  Neurological: He is alert and oriented to person, place, and time  Skin: He is not diaphoretic  Psychiatric: He has a normal mood and affect  His behavior is normal    Vitals reviewed  LABORATORY DATA     Labs: I have personally reviewed pertinent reports  Results from last 7 days   Lab Units 03/07/20  0625 03/06/20  0503 03/04/20  2328   WBC Thousand/uL 11 16* 11 23* 12 44*   HEMOGLOBIN g/dL 17 7* 15 5 15 8   HEMATOCRIT % 52 2* 44 9 45 8   PLATELETS Thousands/uL 334 281 278   NEUTROS PCT % 59 60 63   MONOS PCT % 10 11 11      Results from last 7 days   Lab Units 03/06/20  0503 03/04/20  2328   POTASSIUM mmol/L 3 7 3 8   CHLORIDE mmol/L 106 107   CO2 mmol/L 28 26   BUN mg/dL 19 11   CREATININE mg/dL 1 20 1 00   CALCIUM mg/dL 8 6 8 5   ALK PHOS U/L  --  87   ALT U/L  --  203*   AST U/L  --  52*              Results from last 7 days   Lab Units 03/04/20  2328   INR  1 12   PTT seconds 30     Results from last 7 days   Lab Units 03/05/20  0245   LACTIC ACID mmol/L 1 0           IMAGING & DIAGNOSTIC TESTING     Radiology Results: I have personally reviewed pertinent reports  No results found  Other Diagnostic Testing: I have personally reviewed pertinent reports      ACTIVE MEDICATIONS     Current Facility-Administered Medications   Medication Dose Route Frequency    ceFAZolin (ANCEF) 2,000 mg in sodium chloride 0 9 % 50 mL IVPB  2,000 mg Intravenous Q8H    cloNIDine (CATAPRES) tablet 0 3 mg  0 3 mg Oral Q2H PRN    diazepam (VALIUM) injection 5 mg  5 mg Intravenous Q6H PRN    gabapentin (NEURONTIN) capsule 300 mg  300 mg Oral TID    ibuprofen (MOTRIN) tablet 600 mg  600 mg Oral Q6H PRN    loperamide (IMODIUM) capsule 4 mg  4 mg Oral 4x Daily PRN    nicotine (NICODERM CQ) 7 mg/24hr TD 24 hr patch 7 mg  7 mg Transdermal Daily       VTE Pharmacologic Prophylaxis:  Low risk ambulate the patient  VTE Mechanical Prophylaxis: sequential compression device    Portions of the record may have been created with voice recognition software  Occasional wrong word or "sound a like" substitutions may have occurred due to the inherent limitations of voice recognition software    Read the chart carefully and recognize, using context, where substitutions have occurred   ==  Talha Cuadra MD  [address]  Internal Medicine Residency PGY-3

## 2020-03-07 NOTE — PLAN OF CARE
Problem: PAIN - ADULT  Goal: Verbalizes/displays adequate comfort level or baseline comfort level  Description  Interventions:  - Encourage patient to monitor pain and request assistance  - Assess pain using appropriate pain scale  - Administer analgesics based on type and severity of pain and evaluate response  - Implement non-pharmacological measures as appropriate and evaluate response  - Consider cultural and social influences on pain and pain management  - Notify physician/advanced practitioner if interventions unsuccessful or patient reports new pain  Outcome: Progressing     Problem: INFECTION - ADULT  Goal: Absence or prevention of progression during hospitalization  Description  INTERVENTIONS:  - Assess and monitor for signs and symptoms of infection  - Monitor lab/diagnostic results  - Monitor all insertion sites, i e  indwelling lines, tubes, and drains  - Monitor endotracheal if appropriate and nasal secretions for changes in amount and color  - [place] appropriate cooling/warming therapies per order  - Administer medications as ordered  - Instruct and encourage patient and family to use good hand hygiene technique  - Identify and instruct in appropriate isolation precautions for identified infection/condition  Outcome: Progressing  Goal: Absence of fever/infection during neutropenic period  Description  INTERVENTIONS:  - Monitor WBC    Outcome: Progressing     Problem: SAFETY ADULT  Goal: Patient will remain free of falls  Description  INTERVENTIONS:  - Assess patient frequently for physical needs  -  Identify cognitive and physical deficits and behaviors that affect risk of falls    -  [place] fall precautions as indicated by assessment   - Educate patient/family on patient safety including physical limitations  - Instruct patient to call for assistance with activity based on assessment  - Modify environment to reduce risk of injury  - Consider OT/PT consult to assist with strengthening/mobility  Outcome: Progressing  Goal: Maintain or return to baseline ADL function  Description  INTERVENTIONS:  -  Assess patient's ability to carry out ADLs; assess patient's baseline for ADL function and identify physical deficits which impact ability to perform ADLs (bathing, care of mouth/teeth, toileting, grooming, dressing, etc )  - Assess/evaluate cause of self-care deficits   - Assess range of motion  - Assess patient's mobility; develop plan if impaired  - Assess patient's need for assistive devices and provide as appropriate  - Encourage maximum independence but intervene and supervise when necessary  - Involve family in performance of ADLs  - Assess for home care needs following discharge   - Consider OT consult to assist with ADL evaluation and planning for discharge  - Provide patient education as appropriate  Outcome: Progressing  Goal: Maintain or return mobility status to optimal level  Description  INTERVENTIONS:  - Assess patient's baseline mobility status (ambulation, transfers, stairs, etc )    - Identify cognitive and physical deficits and behaviors that affect mobility  - Identify mobility aids required to assist with transfers and/or ambulation (gait belt, sit-to-stand, lift, walker, cane, etc )  - [place] fall precautions as indicated by assessment  - Record patient progress and toleration of activity level on Mobility SBAR; progress patient to next Phase/Stage  - Instruct patient to call for assistance with activity based on assessment  - Consider rehabilitation consult to assist with strengthening/weightbearing, etc   Outcome: Progressing     Problem: DISCHARGE PLANNING  Goal: Discharge to home or other facility with appropriate resources  Description  INTERVENTIONS:  - Identify barriers to discharge w/patient and caregiver  - Arrange for needed discharge resources and transportation as appropriate  - Identify discharge learning needs (meds, wound care, etc )  - Arrange for interpretive services to assist at discharge as needed  - Refer to Case Management Department for coordinating discharge planning if the patient needs post-hospital services based on physician/advanced practitioner order or complex needs related to functional status, cognitive ability, or social support system  Outcome: Progressing     Problem: Knowledge Deficit  Goal: Patient/family/caregiver demonstrates understanding of disease process, treatment plan, medications, and discharge instructions  Description  Complete learning assessment and assess knowledge base  Interventions:  - Provide teaching at level of understanding  - Provide teaching via preferred learning methods  Outcome: Progressing     Problem: Potential for Falls  Goal: Patient will remain free of falls  Description  INTERVENTIONS:  - Assess patient frequently for physical needs  -  Identify cognitive and physical deficits and behaviors that affect risk of falls    -  [place] fall precautions as indicated by assessment   - Educate patient/family on patient safety including physical limitations  - Instruct patient to call for assistance with activity based on assessment  - Modify environment to reduce risk of injury  - Consider OT/PT consult to assist with strengthening/mobility  Outcome: Progressing

## 2020-03-08 ENCOUNTER — HOSPITAL ENCOUNTER (INPATIENT)
Facility: HOSPITAL | Age: 30
LOS: 1 days | Discharge: LEFT BEFORE DISCHARGE | DRG: 306 | End: 2020-03-09
Attending: INTERNAL MEDICINE | Admitting: INTERNAL MEDICINE
Payer: COMMERCIAL

## 2020-03-08 DIAGNOSIS — B95.61 MSSA BACTEREMIA: Primary | ICD-10-CM

## 2020-03-08 DIAGNOSIS — I26.90 SEPTIC PULMONARY EMBOLISM (HCC): ICD-10-CM

## 2020-03-08 DIAGNOSIS — R78.81 MSSA BACTEREMIA: Primary | ICD-10-CM

## 2020-03-08 DIAGNOSIS — R00.0 TACHYCARDIA: ICD-10-CM

## 2020-03-08 PROBLEM — Z92.241 HISTORY OF ANABOLIC STEROID USE: Status: ACTIVE | Noted: 2020-03-08

## 2020-03-08 LAB
ALBUMIN SERPL BCP-MCNC: 3.5 G/DL (ref 3.5–5)
ALP SERPL-CCNC: 90 U/L (ref 46–116)
ALT SERPL W P-5'-P-CCNC: 98 U/L (ref 12–78)
AMPHETAMINES SERPL QL SCN: POSITIVE
ANION GAP SERPL CALCULATED.3IONS-SCNC: 4 MMOL/L (ref 4–13)
ANION GAP SERPL CALCULATED.3IONS-SCNC: 8 MMOL/L (ref 4–13)
AST SERPL W P-5'-P-CCNC: 38 U/L (ref 5–45)
ATRIAL RATE: 119 BPM
BARBITURATES UR QL: NEGATIVE
BASOPHILS # BLD AUTO: 0.05 THOUSANDS/ΜL (ref 0–0.1)
BASOPHILS NFR BLD AUTO: 0 % (ref 0–1)
BENZODIAZ UR QL: POSITIVE
BILIRUB SERPL-MCNC: 0.34 MG/DL (ref 0.2–1)
BUN SERPL-MCNC: 19 MG/DL (ref 5–25)
BUN SERPL-MCNC: 20 MG/DL (ref 5–25)
CALCIUM SERPL-MCNC: 8.4 MG/DL (ref 8.3–10.1)
CALCIUM SERPL-MCNC: 9.2 MG/DL (ref 8.3–10.1)
CHLORIDE SERPL-SCNC: 104 MMOL/L (ref 100–108)
CHLORIDE SERPL-SCNC: 106 MMOL/L (ref 100–108)
CO2 SERPL-SCNC: 28 MMOL/L (ref 21–32)
CO2 SERPL-SCNC: 28 MMOL/L (ref 21–32)
COCAINE UR QL: POSITIVE
CREAT SERPL-MCNC: 1.08 MG/DL (ref 0.6–1.3)
CREAT SERPL-MCNC: 1.12 MG/DL (ref 0.6–1.3)
EOSINOPHIL # BLD AUTO: 0.19 THOUSAND/ΜL (ref 0–0.61)
EOSINOPHIL NFR BLD AUTO: 2 % (ref 0–6)
ERYTHROCYTE [DISTWIDTH] IN BLOOD BY AUTOMATED COUNT: 11.7 % (ref 11.6–15.1)
ERYTHROCYTE [DISTWIDTH] IN BLOOD BY AUTOMATED COUNT: 11.8 % (ref 11.6–15.1)
GFR SERPL CREATININE-BSD FRML MDRD: 89 ML/MIN/1.73SQ M
GFR SERPL CREATININE-BSD FRML MDRD: 93 ML/MIN/1.73SQ M
GLUCOSE SERPL-MCNC: 94 MG/DL (ref 65–140)
GLUCOSE SERPL-MCNC: 95 MG/DL (ref 65–140)
HCT VFR BLD AUTO: 44.4 % (ref 36.5–49.3)
HCT VFR BLD AUTO: 46.9 % (ref 36.5–49.3)
HGB BLD-MCNC: 14.9 G/DL (ref 12–17)
HGB BLD-MCNC: 16.1 G/DL (ref 12–17)
IMM GRANULOCYTES # BLD AUTO: 0.11 THOUSAND/UL (ref 0–0.2)
IMM GRANULOCYTES NFR BLD AUTO: 1 % (ref 0–2)
LYMPHOCYTES # BLD AUTO: 2.79 THOUSANDS/ΜL (ref 0.6–4.47)
LYMPHOCYTES NFR BLD AUTO: 23 % (ref 14–44)
MCH RBC QN AUTO: 29.3 PG (ref 26.8–34.3)
MCH RBC QN AUTO: 29.6 PG (ref 26.8–34.3)
MCHC RBC AUTO-ENTMCNC: 33.6 G/DL (ref 31.4–37.4)
MCHC RBC AUTO-ENTMCNC: 34.3 G/DL (ref 31.4–37.4)
MCV RBC AUTO: 86 FL (ref 82–98)
MCV RBC AUTO: 87 FL (ref 82–98)
METHADONE UR QL: NEGATIVE
MONOCYTES # BLD AUTO: 0.99 THOUSAND/ΜL (ref 0.17–1.22)
MONOCYTES NFR BLD AUTO: 8 % (ref 4–12)
NEUTROPHILS # BLD AUTO: 8.11 THOUSANDS/ΜL (ref 1.85–7.62)
NEUTS SEG NFR BLD AUTO: 66 % (ref 43–75)
NRBC BLD AUTO-RTO: 0 /100 WBCS
OPIATES UR QL SCN: POSITIVE
P AXIS: 77 DEGREES
PCP UR QL: NEGATIVE
PLATELET # BLD AUTO: 298 THOUSANDS/UL (ref 149–390)
PLATELET # BLD AUTO: 347 THOUSANDS/UL (ref 149–390)
PMV BLD AUTO: 8.8 FL (ref 8.9–12.7)
PMV BLD AUTO: 8.8 FL (ref 8.9–12.7)
POTASSIUM SERPL-SCNC: 4.1 MMOL/L (ref 3.5–5.3)
POTASSIUM SERPL-SCNC: 4.3 MMOL/L (ref 3.5–5.3)
PR INTERVAL: 140 MS
PROT SERPL-MCNC: 7.2 G/DL (ref 6.4–8.2)
QRS AXIS: 84 DEGREES
QRSD INTERVAL: 98 MS
QT INTERVAL: 310 MS
QTC INTERVAL: 436 MS
RBC # BLD AUTO: 5.08 MILLION/UL (ref 3.88–5.62)
RBC # BLD AUTO: 5.44 MILLION/UL (ref 3.88–5.62)
SODIUM SERPL-SCNC: 138 MMOL/L (ref 136–145)
SODIUM SERPL-SCNC: 140 MMOL/L (ref 136–145)
T WAVE AXIS: 17 DEGREES
THC UR QL: NEGATIVE
VENTRICULAR RATE: 119 BPM
WBC # BLD AUTO: 10.55 THOUSAND/UL (ref 4.31–10.16)
WBC # BLD AUTO: 12.24 THOUSAND/UL (ref 4.31–10.16)

## 2020-03-08 PROCEDURE — 80048 BASIC METABOLIC PNL TOTAL CA: CPT | Performed by: EMERGENCY MEDICINE

## 2020-03-08 PROCEDURE — 99285 EMERGENCY DEPT VISIT HI MDM: CPT | Performed by: EMERGENCY MEDICINE

## 2020-03-08 PROCEDURE — 80053 COMPREHEN METABOLIC PANEL: CPT | Performed by: STUDENT IN AN ORGANIZED HEALTH CARE EDUCATION/TRAINING PROGRAM

## 2020-03-08 PROCEDURE — 99284 EMERGENCY DEPT VISIT MOD MDM: CPT

## 2020-03-08 PROCEDURE — NC001 PR NO CHARGE: Performed by: EMERGENCY MEDICINE

## 2020-03-08 PROCEDURE — 85027 COMPLETE CBC AUTOMATED: CPT | Performed by: STUDENT IN AN ORGANIZED HEALTH CARE EDUCATION/TRAINING PROGRAM

## 2020-03-08 PROCEDURE — 93010 ELECTROCARDIOGRAM REPORT: CPT | Performed by: INTERNAL MEDICINE

## 2020-03-08 PROCEDURE — 93005 ELECTROCARDIOGRAM TRACING: CPT

## 2020-03-08 PROCEDURE — 85025 COMPLETE CBC W/AUTO DIFF WBC: CPT | Performed by: EMERGENCY MEDICINE

## 2020-03-08 PROCEDURE — 80307 DRUG TEST PRSMV CHEM ANLYZR: CPT | Performed by: STUDENT IN AN ORGANIZED HEALTH CARE EDUCATION/TRAINING PROGRAM

## 2020-03-08 PROCEDURE — 36415 COLL VENOUS BLD VENIPUNCTURE: CPT | Performed by: EMERGENCY MEDICINE

## 2020-03-08 RX ORDER — LORAZEPAM 2 MG/ML
0.5 INJECTION INTRAMUSCULAR ONCE
Status: COMPLETED | OUTPATIENT
Start: 2020-03-08 | End: 2020-03-08

## 2020-03-08 RX ORDER — LOPERAMIDE HYDROCHLORIDE 2 MG/1
4 CAPSULE ORAL 4 TIMES DAILY PRN
Status: DISCONTINUED | OUTPATIENT
Start: 2020-03-08 | End: 2020-03-09 | Stop reason: HOSPADM

## 2020-03-08 RX ORDER — ALPRAZOLAM 0.5 MG/1
0.5 TABLET ORAL 3 TIMES DAILY PRN
COMMUNITY

## 2020-03-08 RX ORDER — GABAPENTIN 300 MG/1
300 CAPSULE ORAL 3 TIMES DAILY
Status: DISCONTINUED | OUTPATIENT
Start: 2020-03-08 | End: 2020-03-09 | Stop reason: HOSPADM

## 2020-03-08 RX ORDER — DIAZEPAM 5 MG/ML
5 INJECTION, SOLUTION INTRAMUSCULAR; INTRAVENOUS EVERY 6 HOURS PRN
Status: DISCONTINUED | OUTPATIENT
Start: 2020-03-08 | End: 2020-03-09 | Stop reason: HOSPADM

## 2020-03-08 RX ORDER — ACETAMINOPHEN 325 MG/1
650 TABLET ORAL EVERY 6 HOURS PRN
Status: DISCONTINUED | OUTPATIENT
Start: 2020-03-08 | End: 2020-03-09 | Stop reason: HOSPADM

## 2020-03-08 RX ADMIN — CEFAZOLIN SODIUM 2000 MG: 10 INJECTION, POWDER, FOR SOLUTION INTRAVENOUS at 14:02

## 2020-03-08 RX ADMIN — CEFAZOLIN SODIUM 2000 MG: 10 INJECTION, POWDER, FOR SOLUTION INTRAVENOUS at 06:00

## 2020-03-08 RX ADMIN — GABAPENTIN 300 MG: 300 CAPSULE ORAL at 16:00

## 2020-03-08 RX ADMIN — LORAZEPAM 0.5 MG: 2 INJECTION INTRAMUSCULAR; INTRAVENOUS at 04:30

## 2020-03-08 RX ADMIN — GABAPENTIN 300 MG: 300 CAPSULE ORAL at 09:56

## 2020-03-08 RX ADMIN — CEFAZOLIN SODIUM 2000 MG: 10 INJECTION, POWDER, FOR SOLUTION INTRAVENOUS at 20:39

## 2020-03-08 RX ADMIN — SODIUM CHLORIDE 1000 ML: 0.9 INJECTION, SOLUTION INTRAVENOUS at 04:14

## 2020-03-08 RX ADMIN — DIAZEPAM 5 MG: 10 INJECTION, SOLUTION INTRAMUSCULAR; INTRAVENOUS at 16:00

## 2020-03-08 NOTE — H&P
INTERNAL MEDICINE RESIDENCY ADMISSION H&P     Name: James Marroquin   Age & Sex: 30 y o  male   MRN: [id number]  Unit/Bed#: [facility] 423-01   Encounter: [id number]  Primary Care Provider: No primary care provider on file  Code Status: Level 1 - Full Code  Admission Status: INPATIENT   Disposition: Patient requires Med/Surg    ASSESSMENT/PLAN     Principal Problem:    Staphylococcus aureus bacteremia  Active Problems:    Opioid use disorder ([address] )    Acute septic pulmonary embolism (HCC)    Hepatitis C    Transaminitis    Nicotine dependence      * Staphylococcus aureus bacteremia  Assessment & Plan  Patient came back to the hospital to complete his IV antibiotic course  Blood cultures from March 1st at [facility] grew MSSA  Recently hospitalized at [facility] for MSSA bacteremia and patient left on 3/7/20 for social reasons and came back few hours later  Stable on presentation  Heart rate 130 and WBC 11 1  Blood cultures from March 4 showed no growth at 48 hours  Concern for endocarditis although MAURA at [facility] did not show any vegetations  Plan:  · Will start patient back on IV cefazolin 2 mg q 8 hours  · Consider ID consult  · Trend fever and WBC count  · No need for repeat imaging or blood cultures    Hepatitis C  Assessment & Plan  Patient reports to having history of hepatitis C infection  Outpatient follow-up and treatment with GI  Acute septic pulmonary embolism [facility])  Assessment & Plan  Based on the CT scan findings from March 1st at [facility]  MAURA did not show any vegetations but there is still concern for tricuspid endocarditis causing septic emboli  Management with IV antibiotics as mentioned above  Opioid use disorder [facility])  Assessment & Plan  Admits to abusing heroin and cocaine before presenting to the hospital on March 3rd  Denies any IV drug use after leaving hospital yesterday    Will continue same medication regimen for withdrawal with clonidine 0 3 mg Q 3 p r n , loperamide 4 mg Q 4 p r n , Valium 5 mg q 6 hours p r n  VTE Pharmacologic Prophylaxis: Sequential compression device (Venodyne)   VTE Mechanical Prophylaxis: sequential compression device    CHIEF COMPLAINT     Chief Complaint   Patient presents with    Medical Problem     pt states he has a "blood infection"  pt was suppose to be admitted but signed out [place]  pt returning for admission      HISTORY OF PRESENT ILLNESS     This 18-year-old male with past medical history of IV drug use, anxiety presented to the hospital to complete his IV antibiotic course for MSSA bacteremia  On presentation to ER, temperature was 98 2°, heart rate 130, respiratory rate 20, /81, oxygen 96% room air  Labs showed WBC 11 1, hemoglobin 17 7  No imaging studies done  Patient was admitted to complete the course of his IV antibiotics for MSSA bacteremia  Patient was recently hospitalized from March 4th to March 7th for MSSA bacteremia  Id was consulted and patient was started on cefazolin 2g IV q 8 hours  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  ID promised him that he will be discharged on 3/9/20 after 72 hours negative blood cultures and then continue antibiotic outpatient to accommodate his request but patient left AMA on 3/7/20  Before presenting to [facility] on March 3rd, patient was at [facility] for similar complaint but also left AMA  After leaving hospital yesterday, patient denies any IV drug use  Patient admits that he spent time with family and he had a couple of alcoholic drinks  Patient denies any chest pain, shortness of breath, abdominal pain, nausea or vomiting  Admits to feeling fine and would like to complete his antibiotic course  REVIEW OF SYSTEMS     Review of Systems   Constitutional: Negative for activity change, chills, diaphoresis, fatigue and fever     HENT: Negative for congestion, rhinorrhea, sinus pressure and sinus pain  Respiratory: Negative for apnea, cough, shortness of breath and stridor  Cardiovascular: Negative for chest pain, palpitations and leg swelling  Gastrointestinal: Negative for abdominal distention, abdominal pain, blood in stool, constipation and diarrhea  Endocrine: Negative for cold intolerance, heat intolerance and polyuria  Genitourinary: Negative for difficulty urinating  Musculoskeletal: Negative for arthralgias, back pain, joint swelling and myalgias  Skin: Negative for color change, pallor, rash and wound  Neurological: Negative for dizziness, seizures, syncope, light-headedness, numbness and headaches  Psychiatric/Behavioral: Negative for agitation and hallucinations  The patient is not nervous/anxious and is not hyperactive  OBJECTIVE     Vitals:    20 0331 20 0450   BP: 147/81 144/65   BP Location: Right arm Left arm   Pulse: (!) 130 (!) 118   Resp: 20 18   Temp: 98 2 °F (36 8 °C) 98 1 °F (36 7 °C)   TempSrc: Oral Oral   SpO2: 96% 96%   Weight:  81 8 kg (180 lb 5 4 oz)   Height:  5' 8" (1 727 m)      Temperature:   Temp (24hrs), Av 2 °F (36 8 °C), Min:98 1 °F (36 7 °C), Max:98 2 °F (36 8 °C)    Temperature: 98 1 °F (36 7 °C)  Intake & Output:  I/O     None        Weights:   IBW: 68 4 kg    Body mass index is 27 42 kg/m²  Weight (last 2 days)     Date/Time   Weight    20 0450   81 8 (180 34)            Physical Exam   Constitutional: He is oriented to person, place, and time  He appears well-developed and well-nourished  No distress  HENT:   Head: Normocephalic and atraumatic  Nose: Nose normal    Mouth/Throat: Oropharynx is clear and moist  No oropharyngeal exudate  Eyes: Right eye exhibits no discharge  Left eye exhibits no discharge  No scleral icterus  Neck: Normal range of motion  Neck supple  Cardiovascular: Normal rate, regular rhythm, normal heart sounds and intact distal pulses   Exam reveals no gallop and no friction rub  No murmur heard  Tachycardia  Pulmonary/Chest: Effort normal  No stridor  No respiratory distress  He has no wheezes  He has no rales  Abdominal: Soft  Bowel sounds are normal  He exhibits no distension and no mass  There is no tenderness  There is no rebound and no guarding  Musculoskeletal: Normal range of motion  He exhibits no edema  Neurological: He is alert and oriented to person, place, and time  Skin: Skin is warm  He is not diaphoretic  No erythema  Psychiatric: He has a normal mood and affect  Vitals reviewed  PAST MEDICAL HISTORY     Past Medical History:   Diagnosis Date    Seizures ([address] )      PAST SURGICAL HISTORY     Past Surgical History:   Procedure Laterality Date    APPENDECTOMY       SOCIAL & FAMILY HISTORY     Social History     Substance and Sexual Activity   Alcohol Use Yes    Comment: Daily       Social History     Substance and Sexual Activity   Drug Use Yes    Types: Prescription, Heroin, Cocaine     Social History     Tobacco Use   Smoking Status Current Every Day Smoker    Packs/day: 0 50    Types: Cigarettes   Smokeless Tobacco Never Used     Family History   Problem Relation Age of Onset    No Known Problems Mother     No Known Problems Father      LABORATORY DATA     Labs: I have personally reviewed pertinent reports      Results from last 7 days   Lab Units 03/08/20  0413 03/07/20  0625 03/06/20  0503   WBC Thousand/uL 12 24* 11 16* 11 23*   HEMOGLOBIN g/dL 16 1 17 7* 15 5   HEMATOCRIT % 46 9 52 2* 44 9   PLATELETS Thousands/uL 347 334 281   NEUTROS PCT % 66 59 60   MONOS PCT % 8 10 11      Results from last 7 days   Lab Units 03/08/20  0413 03/06/20  0503 03/04/20  2328   POTASSIUM mmol/L 4 3 3 7 3 8   CHLORIDE mmol/L 104 106 107   CO2 mmol/L 28 28 26   BUN mg/dL 19 19 11   CREATININE mg/dL 1 12 1 20 1 00   CALCIUM mg/dL 9 2 8 6 8 5   ALK PHOS U/L  --   --  87   ALT U/L  --   --  203*   AST U/L  --   --  52*              Results from last 7 days Lab Units 03/04/20  2328   INR  1 12   PTT seconds 30     Results from last 7 days   Lab Units 03/05/20  0245   LACTIC ACID mmol/L 1 0         Micro:  Lab Results   Component Value Date    BLOODCX No Growth at 48 hrs  03/04/2020    BLOODCX No Growth at 48 hrs  03/04/2020     IMAGING & DIAGNOSTIC TESTS     Imaging: I have personally reviewed pertinent reports  No results found  EKG, Pathology, and Other Studies: I have personally reviewed pertinent reports  ALLERGIES   No Known Allergies  MEDICATIONS PRIOR TO ARRIVAL     Prior to Admission medications    Medication Sig Start Date End Date Taking? Authorizing Provider   gabapentin (NEURONTIN) 300 mg capsule Take 1 capsule (300 mg total) by mouth 3 (three) times a day for 2 doses 3/7/20 3/8/20  Matt Celeste MD     MEDICATIONS ADMINISTERED IN LAST 24 HOURS     Medication Administration - last 24 hours from 03/07/2020 0529 to 03/08/2020 0529       Date/Time Order Dose Route Action Action by     03/08/2020 0414 sodium chloride 0 9 % bolus 1,000 mL 1,000 mL Intravenous Colleen 37 Wendi Lozano RN     03/08/2020 0430 LORazepam (ATIVAN) injection 0 5 mg 0 5 mg Intravenous Given Wendi Lozano RN        CURRENT MEDICATIONS       Current Facility-Administered Medications:  acetaminophen 650 mg Oral Q6H PRN [place] Paget, DO   cefazolin 2,000 mg Intravenous Q8H [place] Paget, DO   cloNIDine 0 3 mg Oral Q3H PRN Pauly Paget, DO   diazepam 5 mg Intravenous Q6H PRN Pauly Paget, DO   gabapentin 300 mg Oral TID [place] Paget, DO   loperamide 4 mg Oral 4x Daily PRN [place] Paget, DO          acetaminophen 650 mg Q6H PRN   cloNIDine 0 3 mg Q3H PRN   diazepam 5 mg Q6H PRN   loperamide 4 mg 4x Daily PRN       Admission Time  I spent 1 hour admitting the patient  This involved direct patient contact where I performed a full history and physical, reviewing previous records, and reviewing laboratory and other diagnostic studies      Portions of the record may have been created with voice recognition software  Occasional wrong word or "sound a like" substitutions may have occurred due to the inherent limitations of voice recognition software    Read the chart carefully and recognize, using context, where substitutions have occurred     ==  Christy Peabody, [facility]  Internal Medicine Residency PGY-1

## 2020-03-08 NOTE — ASSESSMENT & PLAN NOTE
Admits to abusing heroin and cocaine before presenting to the hospital on March 3rd  Denies any IV drug use after leaving hospital AMA on 3/7, and returning in early AM of 3/8    Will continue same medication regimen for withdrawal with clonidine 0 3 mg Q 3 p r n , loperamide 4 mg Q 4 p r n , Valium 5 mg q 6 hours p r n    - Patient had received one dose of suboxone on 3/5 but further doses were held  - Plans to follow up at [facility] for intensive outpatient program

## 2020-03-08 NOTE — ASSESSMENT & PLAN NOTE
Based on the CT scan findings from March 1st at [facility]  MAURA did not show any vegetations but there is still concern for tricuspid endocarditis causing septic emboli  Management with IV antibiotics as mentioned above

## 2020-03-08 NOTE — PLAN OF CARE
Problem: PAIN - ADULT  Goal: Verbalizes/displays adequate comfort level or baseline comfort level  Description  Interventions:  - Encourage patient to monitor pain and request assistance  - Assess pain using appropriate pain scale  - Administer analgesics based on type and severity of pain and evaluate response  - Implement non-pharmacological measures as appropriate and evaluate response  - Consider cultural and social influences on pain and pain management  - Notify physician/advanced practitioner if interventions unsuccessful or patient reports new pain  Outcome: Progressing     Problem: INFECTION - ADULT  Goal: Absence or prevention of progression during hospitalization  Description  INTERVENTIONS:  - Assess and monitor for signs and symptoms of infection  - Monitor lab/diagnostic results  - Monitor all insertion sites, i e  indwelling lines, tubes, and drains  - Monitor endotracheal if appropriate and nasal secretions for changes in amount and color  - [place] appropriate cooling/warming therapies per order  - Administer medications as ordered  - Instruct and encourage patient and family to use good hand hygiene technique  - Identify and instruct in appropriate isolation precautions for identified infection/condition  Outcome: Progressing  Goal: Absence of fever/infection during neutropenic period  Description  INTERVENTIONS:  - Monitor WBC    Outcome: Progressing     Problem: SAFETY ADULT  Goal: Patient will remain free of falls  Description  INTERVENTIONS:  - Assess patient frequently for physical needs  -  Identify cognitive and physical deficits and behaviors that affect risk of falls    -  [place] fall precautions as indicated by assessment   - Educate patient/family on patient safety including physical limitations  - Instruct patient to call for assistance with activity based on assessment  - Modify environment to reduce risk of injury  - Consider OT/PT consult to assist with strengthening/mobility  Outcome: Progressing  Goal: Maintain or return to baseline ADL function  Description  INTERVENTIONS:  -  Assess patient's ability to carry out ADLs; assess patient's baseline for ADL function and identify physical deficits which impact ability to perform ADLs (bathing, care of mouth/teeth, toileting, grooming, dressing, etc )  - Assess/evaluate cause of self-care deficits   - Assess range of motion  - Assess patient's mobility; develop plan if impaired  - Assess patient's need for assistive devices and provide as appropriate  - Encourage maximum independence but intervene and supervise when necessary  - Involve family in performance of ADLs  - Assess for home care needs following discharge   - Consider OT consult to assist with ADL evaluation and planning for discharge  - Provide patient education as appropriate  Outcome: Progressing  Goal: Maintain or return mobility status to optimal level  Description  INTERVENTIONS:  - Assess patient's baseline mobility status (ambulation, transfers, stairs, etc )    - Identify cognitive and physical deficits and behaviors that affect mobility  - Identify mobility aids required to assist with transfers and/or ambulation (gait belt, sit-to-stand, lift, walker, cane, etc )  - [place] fall precautions as indicated by assessment  - Record patient progress and toleration of activity level on Mobility SBAR; progress patient to next Phase/Stage  - Instruct patient to call for assistance with activity based on assessment  - Consider rehabilitation consult to assist with strengthening/weightbearing, etc   Outcome: Progressing     Problem: DISCHARGE PLANNING  Goal: Discharge to home or other facility with appropriate resources  Description  INTERVENTIONS:  - Identify barriers to discharge w/patient and caregiver  - Arrange for needed discharge resources and transportation as appropriate  - Identify discharge learning needs (meds, wound care, etc )  - Arrange for interpretive services to assist at discharge as needed  - Refer to Case Management Department for coordinating discharge planning if the patient needs post-hospital services based on physician/advanced practitioner order or complex needs related to functional status, cognitive ability, or social support system  Outcome: Progressing     Problem: Knowledge Deficit  Goal: Patient/family/caregiver demonstrates understanding of disease process, treatment plan, medications, and discharge instructions  Description  Complete learning assessment and assess knowledge base    Interventions:  - Provide teaching at level of understanding  - Provide teaching via preferred learning methods  Outcome: Progressing

## 2020-03-08 NOTE — DISCHARGE SUMMARY
IMR Discharge Summary - Medical Kamran Velez 30 y o  male MRN: [id number]    [address] [facility]  Room / Bed: /-01 Encounter: [id number]    BRIEF OVERVIEW    Admitting Provider: Moises Blackburn MD  Discharge Provider: Cassie Briceño MD  Primary Care Physician at Discharge: Need to establish care with PCP  Consults:   Infectious disease  [facility]  Admission Date: 3/4/2020     Discharge Date: 3/7/2020  6:21 PM    Hospital Course  This is a difficult 31 yo Male with active IVDA (heroin) who originally self admitted for MSSA bacteremia and left against medical advise reported has family emergency  He self admitted himself after he left [facility] 3/4/20  when was diagnosed with MSSA bacteremia and MAURA was limited with no vegetation and TTE no vegetation however CT chest showed septic pulmonary emboli  Infectious disease consulted and was started on 2 g cefazolin IV Q 8 through peripheral IV lines  He then 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 admitted to the hospital  ID then promised patient he will be discharged 3/9/20 after 72 hrs negative blood culture to complete 6 weeks of IV daptomycin through 4/15/20 to accommodate his request      He however signed against medical advise 3/7/20  Presenting Problem/History of Present Illness  Principal Problem:    Staphylococcus aureus bacteremia  Active Problems:    Opioid use disorder ([address] )    Acute septic pulmonary embolism (HCC)    Hepatitis C    Transaminitis    Nicotine dependence  Resolved Problems:    Headache        Diagnostic Procedures Performed  Imaging Studies:  No orders to display       Pertinent Labs: Therapeutic Procedures Performed  None  Test Results Pending at Discharge:   Final blood culture pending       Medications     Medication List to be Continued at Discharge  Discharge Medication List as of 3/7/2020  4:57 PM        Discharge Medication List as of 3/7/2020  4:57 PM      START taking these medications    Details   gabapentin (NEURONTIN) 300 mg capsule Take 1 capsule (300 mg total) by mouth 3 (three) times a day for 2 doses, Starting Sat 3/7/2020, Until Sun 3/8/2020, Normal           Discharge Medication List as of 3/7/2020  4:57 PM          Allergies  No Known Allergies  Discharge Diet: regular diet  Activity restrictions: none  Discharge Condition: fair  Discharged With Lines: no    Discharge Disposition: Left against medical advice or discontinued care  Facility / Family Member Name:  Extended Emergency Contact Information  Primary Emergency Contact: [facility]  Address: [facility] [place] , [place] Phone: [phone]  Mobile Phone: [phone]  Relation: Mother      Outpatient Follow-Up  : Left against medical advise  Code Status: Prior  Advance Directive and Living Will: <no information>  Power of :    POLST:      Discharge  Statement   I spent 15 minutes minutes discharging the patient  This time was spent on the day of discharge  I had direct contact with the patient on the day of discharge  Additional documentation is required if more than 30 minutes were spent on discharge         Michelle Resendiz MD  Available on [employer]  [facility]  Internal medicine resident

## 2020-03-08 NOTE — ED PROVIDER NOTES
History  Chief Complaint   Patient presents with    Medical Problem     pt states he has a "blood infection"  pt was suppose to be admitted but signed out [place]  pt returning for admission     HPI    20-year-old male pmhx IV drug abuse presents for evaluation for "blood infection " Patient says he left AMA this morning after being admitted on 03/04/20 for MSSA bacteremia and septic pulmonary emboli  Patient says he had to leave this morning due to a family emergency but told them he would be back tonight to be readmitted  Patient says he has felt okay throughout the day but has been anxious  Patient says he last used on 03/04  Patient denies any chest pain or shortness of breath at this time  Per chart review, patient has been admitted several times to [facility] for infective endocarditis but had signed out AMA multiple times  Prior to Admission Medications   Prescriptions Last Dose Informant Patient Reported? Taking? ALPRAZolam (XANAX) 0 5 mg tablet Past Week at Unknown time Self Yes Yes   Sig: Take 0 5 mg by mouth 3 (three) times a day as needed for anxiety   gabapentin (NEURONTIN) 300 mg capsule 3/7/2020 at Unknown time  No Yes   Sig: Take 1 capsule (300 mg total) by mouth 3 (three) times a day for 2 doses      Facility-Administered Medications: None       Past Medical History:   Diagnosis Date    Seizures ([address] )        Past Surgical History:   Procedure Laterality Date    APPENDECTOMY         Family History   Problem Relation Age of Onset    No Known Problems Mother     No Known Problems Father      I have reviewed and agree with the history as documented  E-Cigarette/Vaping    E-Cigarette Use Current Some Day User      E-Cigarette/Vaping Substances    Nicotine Yes     Flavoring Yes      Social History     Tobacco Use    Smoking status: Current Every Day Smoker     Packs/day: 0 50     Types: Cigarettes    Smokeless tobacco: Never Used   Substance Use Topics    Alcohol use:  Yes Comment: Daily    Drug use: Yes     Types: Prescription, Heroin, Cocaine        Review of Systems   Constitutional: Positive for diaphoresis  Negative for chills, fatigue and fever  HENT: Negative for congestion, rhinorrhea and sore throat  Eyes: Negative for photophobia and visual disturbance  Respiratory: Negative for cough, chest tightness and shortness of breath  Cardiovascular: Negative for chest pain and palpitations  Gastrointestinal: Negative for abdominal pain, blood in stool, constipation, diarrhea, nausea and vomiting  Genitourinary: Negative for decreased urine volume, dysuria, frequency and hematuria  Musculoskeletal: Negative for back pain, gait problem, myalgias, neck pain and neck stiffness  Skin: Negative for pallor and rash  Neurological: Negative for weakness, light-headedness, numbness and headaches  Hematological: Negative for adenopathy  Does not bruise/bleed easily  Psychiatric/Behavioral: The patient is nervous/anxious  All other systems reviewed and are negative  Physical Exam  ED Triage Vitals [03/08/20 0331]   Temperature Pulse Respirations Blood Pressure SpO2   98 2 °F (36 8 °C) (!) 130 20 147/81 96 %      Temp Source Heart Rate Source Patient Position - Orthostatic VS BP Location FiO2 (%)   Oral Monitor Lying Right arm --      Pain Score       No Pain             Orthostatic Vital Signs  Vitals:    03/08/20 0331 03/08/20 0450   BP: 147/81 144/65   Pulse: (!) 130 (!) 118   Patient Position - Orthostatic VS: Lying Lying       Physical Exam   Constitutional: He is oriented to person, place, and time  No distress  Alert and oriented, does not appear to be in acute distress    HENT:   Head: Atraumatic  Mouth/Throat: Oropharynx is clear and moist    Eyes: Pupils are equal, round, and reactive to light  Conjunctivae and EOM are normal    Neck: Normal range of motion  Neck supple  Cardiovascular: Regular rhythm, normal heart sounds and intact distal pulses  Tachycardia   Pulmonary/Chest: Effort normal and breath sounds normal  No respiratory distress  He exhibits no tenderness  Abdominal: Soft  Bowel sounds are normal  He exhibits no distension  There is no tenderness  Musculoskeletal: Normal range of motion  Neurological: He is alert and oriented to person, place, and time  No cranial nerve deficit  He exhibits normal muscle tone  Skin: Skin is warm  Capillary refill takes less than 2 seconds  No rash noted  He is diaphoretic  No erythema  No pallor  Psychiatric: He has a normal mood and affect  His behavior is normal  Judgment and thought content normal    Nursing note and vitals reviewed        ED Medications  Medications   ceFAZolin (ANCEF) 2,000 mg in sodium chloride 0 9 % 50 mL IVPB (2,000 mg Intravenous New Bag 3/8/20 0600)   gabapentin (NEURONTIN) capsule 300 mg (has no administration in time range)   acetaminophen (TYLENOL) tablet 650 mg (has no administration in time range)   cloNIDine (CATAPRES) tablet 0 3 mg (has no administration in time range)   diazepam (VALIUM) injection 5 mg (has no administration in time range)   loperamide (IMODIUM) capsule 4 mg (has no administration in time range)   sodium chloride 0 9 % bolus 1,000 mL (1,000 mL Intravenous New Bag 3/8/20 0414)   LORazepam (ATIVAN) injection 0 5 mg (0 5 mg Intravenous Given 3/8/20 0430)       Diagnostic Studies  Results Reviewed     Procedure Component Value Units Date/Time    Basic metabolic panel [[id number]] Collected:  03/08/20 0413    Lab Status:  Final result Specimen:  Blood from Arm, Left Updated:  03/08/20 0450     Sodium 140 mmol/L      Potassium 4 3 mmol/L      Chloride 104 mmol/L      CO2 28 mmol/L      ANION GAP 8 mmol/L      BUN 19 mg/dL      Creatinine 1 12 mg/dL      Glucose 94 mg/dL      Calcium 9 2 mg/dL      eGFR 89 ml/min/1 73sq m     Narrative:       [place] guidelines for Chronic Kidney Disease (CKD):     Stage 1 with normal or high GFR (GFR > 90 mL/min/1 73 square meters)    Stage 2 Mild CKD (GFR = 60-89 mL/min/1 73 square meters)    Stage 3A Moderate CKD (GFR = 45-59 mL/min/1 73 square meters)    Stage 3B Moderate CKD (GFR = 30-44 mL/min/1 73 square meters)    Stage 4 Severe CKD (GFR = 15-29 mL/min/1 73 square meters)    Stage 5 End Stage CKD (GFR <15 mL/min/1 73 square meters)  Note: GFR calculation is accurate only with a steady state creatinine    CBC and differential [[id number]]  (Abnormal) Collected:  03/08/20 0413    Lab Status:  Final result Specimen:  Blood from Arm, Left Updated:  03/08/20 0436     WBC 12 24 Thousand/uL      RBC 5 44 Million/uL      Hemoglobin 16 1 g/dL      Hematocrit 46 9 %      MCV 86 fL      MCH 29 6 pg      MCHC 34 3 g/dL      RDW 11 8 %      MPV 8 8 fL      Platelets 465 Thousands/uL      nRBC 0 /100 WBCs      Neutrophils Relative 66 %      Immat GRANS % 1 %      Lymphocytes Relative 23 %      Monocytes Relative 8 %      Eosinophils Relative 2 %      Basophils Relative 0 %      Neutrophils Absolute 8 11 Thousands/µL      Immature Grans Absolute 0 11 Thousand/uL      Lymphocytes Absolute 2 79 Thousands/µL      Monocytes Absolute 0 99 Thousand/µL      Eosinophils Absolute 0 19 Thousand/µL      Basophils Absolute 0 05 Thousands/µL                  No orders to display         Procedures  Procedures      ED Course                               MDM  Number of Diagnoses or Management Options  MSSA bacteremia:   Septic pulmonary embolism [facility]):   Tachycardia:   Diagnosis management comments: 52-year-old male presents for readmission for infective endocarditis after leaving AMA this morning  Patient is in no acute distress  Patient is tachycardic but otherwise hemodynamically stable  Will obtain peripheral access for IV fluids and obtain a BMP, CBC and discussed with admitting team for readmission          Disposition  Final diagnoses:   MSSA bacteremia   Tachycardia   Septic pulmonary embolism ([address] )     Time reflects when diagnosis was documented in both MDM as applicable and the Disposition within this note     Time User Action Codes Description Comment    3/8/2020  4:21 AM Halie Karimi [R78 81] MSSA bacteremia     3/8/2020  4:21 AM Mardee Guppy Add [R00 0] Tachycardia     3/8/2020  4:22 AM Mardee Guppy Add [I26 90] Septic pulmonary embolism [facility])       ED Disposition     ED Disposition Condition Date/Time Comment    Admit Stable Sun Mar 8, 2020  4:21 AM Case was discussed with [facility] resident and the patient's admission status was agreed to be Admission Status: inpatient status to the service of Dr Elver Carpio    Follow-up Information    None         Current Discharge Medication List      CONTINUE these medications which have NOT CHANGED    Details   ALPRAZolam (XANAX) 0 5 mg tablet Take 0 5 mg by mouth 3 (three) times a day as needed for anxiety      gabapentin (NEURONTIN) 300 mg capsule Take 1 capsule (300 mg total) by mouth 3 (three) times a day for 2 doses  Qty: 2 capsule, Refills: 0    Associated Diagnoses: Infective endocarditis           No discharge procedures on file  PDMP Review     None           ED Provider  Attending physically available and evaluated Reagan Moses I managed the patient along with the ED Attending      Electronically Signed by         Martina Levine DO  03/08/20 8454

## 2020-03-08 NOTE — ED PROVIDER NOTES
Yee Shen MD, saw and evaluated the patient  I have discussed the patient with the resident/non-physician practitioner and agree with the resident's/non-physician practitioner's findings, Plan of Care, and MDM as documented in the resident's/non-physician practitioner's note, except where noted  All available labs and Radiology studies were reviewed  At this point I agree with the current assessment done in the Emergency Department  I have conducted an independent evaluation of this patient including a focused history of:      Emergency Department Note- Edwni Ospina 30 y o  male MRN: [id number]    Unit/Bed#: [facility] 423-01 Encounter: [id number]    Edwin Ospina is a 30 y o  male who presents with   Chief Complaint   Patient presents with    Medical Problem     pt states he has a "blood infection"  pt was suppose to be admitted but signed out [facility]  pt returning for admission         History of Present Illness   HPI:  Edwin Ospina is a 30 y o  male who presents for evaluation of:  Septic emboli and bacteremia  Patient was admitted for treatment yesterday and signed out AMA  Patient is returning now for antibacterial therapy and further evaluation  Review of Systems   Constitutional: Positive for chills and fever  Respiratory: Negative for cough and shortness of breath  Musculoskeletal: Negative for back pain and neck pain  All other systems reviewed and are negative        Historical Information   Past Medical History:   Diagnosis Date    Seizures [facility])      Past Surgical History:   Procedure Laterality Date    APPENDECTOMY       Social History   Social History     Substance and Sexual Activity   Alcohol Use Yes    Comment: Daily     Social History     Substance and Sexual Activity   Drug Use Yes    Types: Prescription, Heroin, Cocaine     Social History     Tobacco Use   Smoking Status Current Every Day Smoker    Packs/day: 0 50    Types: Cigarettes   Smokeless Tobacco Never Used Family History: non-contributory    Meds/Allergies   all medications and allergies reviewed  No Known Allergies    Objective   First Vitals:   Blood Pressure: 147/81 (20)  Pulse: (!) 130 (20)  Temperature: 98 2 °F (36 8 °C) (20)  Temp Source: Oral (20)  Respirations: 20 (20)  Height: 5' 8" (172 7 cm) (20)  Weight - Scale: 81 8 kg (180 lb 5 4 oz) (20)  SpO2: 96 % (20)    Current Vitals:   Blood Pressure: 144/65 (20)  Pulse: (!) 118 (20)  Temperature: 98 1 °F (36 7 °C) (20)  Temp Source: Oral (20)  Respirations: 18 (20)  Height: 5' 8" (172 7 cm) (20)  Weight - Scale: 81 8 kg (180 lb 5 4 oz) (20)  SpO2: 96 % (20)    No intake or output data in the 24 hours ending 20 0517    Invasive Devices     Peripheral Intravenous Line            Peripheral IV 20 Left Forearm less than 1 day                Physical Exam   Constitutional: He is oriented to person, place, and time  He appears well-developed and well-nourished  HENT:   Head: Normocephalic and atraumatic  Musculoskeletal: Normal range of motion  He exhibits no deformity  Neurological: He is alert and oriented to person, place, and time  Skin: Skin is warm and dry  Capillary refill takes less than 2 seconds  Psychiatric: He has a normal mood and affect  His behavior is normal  Judgment and thought content normal    Nursing note and vitals reviewed  31 yo male in no distress    Medical Decision Makin   Bacteremia and septic emboli: admit for antibiotic therapy     Recent Results (from the past 36 hour(s))   CBC and differential    Collection Time: 20  6:25 AM   Result Value Ref Range    WBC 11 16 (H) 4 31 - 10 16 Thousand/uL    RBC 5 97 (H) 3 88 - 5 62 Million/uL    Hemoglobin 17 7 (H) 12 0 - 17 0 g/dL    Hematocrit 52 2 (H) 36 5 - 49 3 %    MCV 87 82 - 98 fL MCH 29 6 26 8 - 34 3 pg    MCHC 33 9 31 4 - 37 4 g/dL    RDW 11 8 11 6 - 15 1 %    MPV 9 4 8 9 - 12 7 fL    Platelets 976 757 - 007 Thousands/uL    nRBC 0 /100 WBCs    Neutrophils Relative 59 43 - 75 %    Immat GRANS % 1 0 - 2 %    Lymphocytes Relative 25 14 - 44 %    Monocytes Relative 10 4 - 12 %    Eosinophils Relative 4 0 - 6 %    Basophils Relative 1 0 - 1 %    Neutrophils Absolute 6 68 1 85 - 7 62 Thousands/µL    Immature Grans Absolute 0 12 0 00 - 0 20 Thousand/uL    Lymphocytes Absolute 2 74 0 60 - 4 47 Thousands/µL    Monocytes Absolute 1 16 0 17 - 1 22 Thousand/µL    Eosinophils Absolute 0 40 0 00 - 0 61 Thousand/µL    Basophils Absolute 0 06 0 00 - 0 10 Thousands/µL   Basic metabolic panel    Collection Time: 03/08/20  4:13 AM   Result Value Ref Range    Sodium 140 136 - 145 mmol/L    Potassium 4 3 3 5 - 5 3 mmol/L    Chloride 104 100 - 108 mmol/L    CO2 28 21 - 32 mmol/L    ANION GAP 8 4 - 13 mmol/L    BUN 19 5 - 25 mg/dL    Creatinine 1 12 0 60 - 1 30 mg/dL    Glucose 94 65 - 140 mg/dL    Calcium 9 2 8 3 - 10 1 mg/dL    eGFR 89 ml/min/1 73sq m   CBC and differential    Collection Time: 03/08/20  4:13 AM   Result Value Ref Range    WBC 12 24 (H) 4 31 - 10 16 Thousand/uL    RBC 5 44 3 88 - 5 62 Million/uL    Hemoglobin 16 1 12 0 - 17 0 g/dL    Hematocrit 46 9 36 5 - 49 3 %    MCV 86 82 - 98 fL    MCH 29 6 26 8 - 34 3 pg    MCHC 34 3 31 4 - 37 4 g/dL    RDW 11 8 11 6 - 15 1 %    MPV 8 8 (L) 8 9 - 12 7 fL    Platelets 223 476 - 096 Thousands/uL    nRBC 0 /100 WBCs    Neutrophils Relative 66 43 - 75 %    Immat GRANS % 1 0 - 2 %    Lymphocytes Relative 23 14 - 44 %    Monocytes Relative 8 4 - 12 %    Eosinophils Relative 2 0 - 6 %    Basophils Relative 0 0 - 1 %    Neutrophils Absolute 8 11 (H) 1 85 - 7 62 Thousands/µL    Immature Grans Absolute 0 11 0 00 - 0 20 Thousand/uL    Lymphocytes Absolute 2 79 0 60 - 4 47 Thousands/µL    Monocytes Absolute 0 99 0 17 - 1 22 Thousand/µL    Eosinophils Absolute 0 19 0 00 - 0 61 Thousand/µL    Basophils Absolute 0 05 0 00 - 0 10 Thousands/µL     No orders to display         Portions of the record may have been created with voice recognition software  Occasional wrong word or "sound a like" substitutions may have occurred due to the inherent limitations of voice recognition software  Read the chart carefully and recognize, using context, where substitutions have occurred           Anat Gill MD  03/08/20 8568

## 2020-03-08 NOTE — ASSESSMENT & PLAN NOTE
Patient came back to the hospital to complete his IV antibiotic course  Blood cultures from March 1st at [facility] grew MSSA  Recently hospitalized at [facility] for MSSA bacteremia and patient left on 3/7/20 for social reasons and came back few hours later  Stable on presentation  Heart rate 130 and WBC 11 1  Blood cultures from March 4 show no growth at 4 days  Concern for endocarditis although MAURA at [facility] did not show any vegetations      Plan:  · Will start patient back on IV cefazolin 2 mg q 8 hours  · ID consulted - pt has outpatient daptomycin infusions to start 3/10  · Patient left AMA and refused to wait for today (3/9)'s daptomycin dose, stating that he has an appointment

## 2020-03-08 NOTE — ASSESSMENT & PLAN NOTE
Patient reports to having history of hepatitis C infection    Outpatient follow-up and treatment with GI

## 2020-03-09 VITALS
OXYGEN SATURATION: 99 % | HEIGHT: 68 IN | DIASTOLIC BLOOD PRESSURE: 63 MMHG | BODY MASS INDEX: 27.33 KG/M2 | TEMPERATURE: 98.3 F | HEART RATE: 100 BPM | RESPIRATION RATE: 18 BRPM | WEIGHT: 180.34 LBS | SYSTOLIC BLOOD PRESSURE: 131 MMHG

## 2020-03-09 RX ORDER — LANOLIN ALCOHOL/MO/W.PET/CERES
3 CREAM (GRAM) TOPICAL
Status: DISCONTINUED | OUTPATIENT
Start: 2020-03-09 | End: 2020-03-09 | Stop reason: HOSPADM

## 2020-03-09 RX ADMIN — CEFAZOLIN SODIUM 2000 MG: 10 INJECTION, POWDER, FOR SOLUTION INTRAVENOUS at 04:43

## 2020-03-09 RX ADMIN — MELATONIN 3 MG: at 03:06

## 2020-03-09 RX ADMIN — GABAPENTIN 300 MG: 300 CAPSULE ORAL at 08:32

## 2020-03-09 RX ADMIN — DIAZEPAM 5 MG: 10 INJECTION, SOLUTION INTRAMUSCULAR; INTRAVENOUS at 00:09

## 2020-03-09 NOTE — NURSING NOTE
Stated he was leaving at [address] [facility] to wait for first dose of IV Daptomycin  States will go to OP appts  Dr Marilyn Goetz here  Pt signed AMA paper and walked out

## 2020-03-09 NOTE — UTILIZATION REVIEW
Notification of Discharge  This is a Notification of Discharge from our facility [address]  Please be advised that this patient has been discharge from our facility  Below you will find the admission and discharge date and time including the patients disposition  PRESENTATION DATE: 3/4/2020 11:05 PM  OBS ADMISSION DATE:   IP ADMISSION DATE: 3/4/20 2352   DISCHARGE DATE: 3/7/2020  6:21 PM  DISPOSITION: Left against medical advice or discontinued care Left against medical advice or discontinued care   Admission Orders listed below:  Admission Orders (From admission, onward)     Ordered        03/04/20 2352  Inpatient Admission  Once                   Please contact the UR Department if additional information is required to close this patient's authorization/case  [address] [place] Utilization Review Department  Main: [phone] x carefully listen to the prompts  All voicemails are confidential   [email or web address]  Send all requests for admission clinical reviews, approved or denied determinations and any other requests to dedicated fax number below belonging to the campus where the patient is receiving treatment   List of dedicated fax numbers:  [address] DENIALS (Administrative/Medical Necessity) [phone]   [address] (Maternity/NICU/Pediatrics) [phone]   Angelshayy Hansonn [phone]   Elmer Beck [phone]   Altagracia Marlow [phone]    [address] [phone]   [facility]  [phone]   [address] [facility]  [address] [facility] [address] [facility] [phone]

## 2020-03-09 NOTE — UTILIZATION REVIEW
Notification of Inpatient Admission/Inpatient Authorization Request   This is a Notification of Inpatient Admission for [address]  Be advised that this patient was admitted to our facility under Inpatient Status  Contact Katie Skaggs at [phone] for additional admission information  Yanci Gal[facility] UR DEPT  DEDICATED -[phone]  Patient Name:   Olimpia Foreman   YOB: 1990       [address]   [address]:   [address]  Tax ID: [id number]  NPI: [id number] Attending Provider/NPI: Junaid Ragsdale Md [[id number]]   Attending Physician:  JENNIFER Ta  Specialty- Internal Medicine,   [address] [id number]  [address]  Phone 1: [phone]  Fax: [phone]   Place of Service Code: 24     Place of Service Name:  [address] [place] [address]   Start Date: 3/8/20 0423     Discharge Date & Time: 3/9/2020 10:45 AM    Type of Admission: Inpatient Status Discharge Disposition (if discharged): Left against medical advice or discontinued care   Patient Diagnoses: Infection [B99 9]  Tachycardia [R00 0]  Septic pulmonary embolism ([address] ) [I26 90]  MSSA bacteremia [R78 81]     Orders: Admission Orders (From admission, onward)     Ordered        03/08/20 0423  Inpatient Admission  Once                    Assigned Utilization Review Contact: Katie Skaggs  Utilization   Network Utilization Review Department  Phone: [phone]; Fax [phone]  Email: [email or web address]   ATTENTION PAYERS: Please call the assigned Utilization  directly with any questions or concerns ALL voicemails in the department are confidential  Send all requests for admission clinical reviews, approved or denied determinations and any other requests to dedicated fax number belonging to the campus where the patient is receiving treatment

## 2020-03-09 NOTE — UTILIZATION REVIEW
Initial Clinical Review    Admission: Date/Time/Statement: Admission Orders (From admission, onward)     Ordered        03/08/20 0423  Inpatient Admission  Once                   Orders Placed This Encounter   Procedures    Inpatient Admission     Standing Status:   Standing     Number of Occurrences:   1     Order Specific Question:   Admitting Physician     Answer:   Edwin Herrera [[id number]]     Order Specific Question:   Level of Care     Answer:   Med Surg [[id number]]     Order Specific Question:   Estimated length of stay     Answer:   More than 2 Midnights     Order Specific Question:   Certification     Answer:   I certify that inpatient services are medically necessary for this patient for a duration of greater than two midnights  See H&P and MD Progress Notes for additional information about the patient's course of treatment  ED Arrival Information     Expected Arrival Acuity Means of Arrival Escorted By Service Admission Type    - 3/8/2020 03:01 Urgent Walk-In Self General Medicine Urgent    Arrival Complaint    infection        Chief Complaint   Patient presents with    Medical Problem     pt states he has a "blood infection"  pt was suppose to be admitted but signed out [place]  pt returning for admission     Assessment/Plan: 35 yr old male to ed to complete his course of iv abx  Blood cultures from [facility] grew mssa and recently admitted at [facility] for mssa bacteremia and left on 3/7 for social reasons and came back an hour later      Blood cultures on 3/4/2020 grew no growth at 48 hours  There is concern for endocarditis  He is admitted as an inpatient with staph aureus bacteremia   and will start back on iv cefazolin , consult id, trend fever and wbc  Continues to be concern for septic embolio  Admits heroin and cocaine use       3/9 patient left ama before id consult n the plan is [facility] with daily daptomycin through daily peripheal iv's   If this fails consider switiching patient to po linezolid or iv dalbavancin    ED Triage Vitals [03/08/20 0331]   Temperature Pulse Respirations Blood Pressure SpO2   98 2 °F (36 8 °C) (!) 130 20 147/81 96 %      Temp Source Heart Rate Source Patient Position - Orthostatic VS BP Location FiO2 (%)   Oral Monitor Lying Right arm --      Pain Score       No Pain        Wt Readings from Last 1 Encounters:   03/08/20 81 8 kg (180 lb 5 4 oz)     Additional Vital Signs:   03/09/20 0701  98 3 °F (36 8 °C)  100  18  131/63  --  99 %  None (Room air)  Lying   03/09/20 0247  98 3 °F (36 8 °C)  91  18  158/93   --  97 %  None (Room air)  Lying   BP: Map 119 at 03/09/20 0247   03/08/20 2317  98 4 °F (36 9 °C)  114Abnormal   18  176/98Abnormal    --  98 %  None (Room air)  Sitting   BP: Map 130 at 03/08/20 2317   03/08/20 2003  98 5 °F (36 9 °C)  104  18  162/100  122  96 %  None (Room air)  Lying   03/08/20 1616  98 8 °F (37 1 °C)  84  18  179/96Abnormal    --  96 %  None (Room air)  Lying   BP: Map 129 at 03/08/20 1616   03/08/20 0900  --  --  --  --  --  --  None (Room air)  --   03/08/20 0450  98 1 °F (36 7 °C)  118Abnormal   18  144/65  --  96 %  None (Room air)  Lying   03/08/20 0331  98 2 °F (36 8 °C)  130Abnormal   20  147/81  --  96 %  None (Room air)  Lying       Pertinent Labs/Diagnostic Test Results:   Results from last 7 days   Lab Units 03/08/20  0624 03/08/20  0413 03/07/20  0625 03/06/20  0503 03/04/20  2328   WBC Thousand/uL 10 55* 12 24* 11 16* 11 23* 12 44*   HEMOGLOBIN g/dL 14 9 16 1 17 7* 15 5 15 8   HEMATOCRIT % 44 4 46 9 52 2* 44 9 45 8   PLATELETS Thousands/uL 298 347 334 281 278   NEUTROS ABS Thousands/µL  --  8 11* 6 68 6 89 7 84*         Results from last 7 days   Lab Units 03/08/20  0624 03/08/20  0413 03/06/20  0503 03/04/20  2328   SODIUM mmol/L 138 140 141 142   POTASSIUM mmol/L 4 1 4 3 3 7 3 8   CHLORIDE mmol/L 106 104 106 107   CO2 mmol/L 28 28 28 26   ANION GAP mmol/L 4 8 7 9   BUN mg/dL 20 19 19 11   CREATININE mg/dL 1 08 1 12 1 20 1 00   EGFR ml/min/1 73sq m 93 89 82 102   CALCIUM mg/dL 8 4 9 2 8 6 8 5     Results from last 7 days   Lab Units 03/08/20  0624 03/04/20  2328   AST U/L 38 52*   ALT U/L 98* 203*   ALK PHOS U/L 90 87   TOTAL PROTEIN g/dL 7 2 7 6   ALBUMIN g/dL 3 5 3 5   TOTAL BILIRUBIN mg/dL 0 34 0 31         Results from last 7 days   Lab Units 03/08/20  0624 03/08/20  0413 03/06/20  0503 03/04/20  2328   GLUCOSE RANDOM mg/dL 95 94 94 98           Results from last 7 days   Lab Units 03/04/20  2328   PROTIME seconds 14 0   INR  1 12   PTT seconds 30         Results from last 7 days   Lab Units 03/04/20  2328   PROCALCITONIN ng/ml 0 05     Results from last 7 days   Lab Units 03/05/20  0245 03/04/20  2328   LACTIC ACID mmol/L 1 0 1 0       Results from last 7 days   Lab Units 03/04/20  2334   CLARITY UA  Clear   COLOR UA  Yellow   SPEC GRAV UA  1 021   PH UA  5 5   GLUCOSE UA mg/dl Negative   KETONES UA mg/dl Negative   BLOOD UA  Negative   PROTEIN UA mg/dl Negative   NITRITE UA  Negative   BILIRUBIN UA  Negative   UROBILINOGEN UA E U /dl 0 2   LEUKOCYTES UA  Negative         Results from last 7 days   Lab Units 03/08/20  0615   AMPH/METH  Positive*   BARBITURATE UR  Negative   BENZODIAZEPINE UR  Positive*   COCAINE UR  Positive*   METHADONE URINE  Negative   OPIATE UR  Positive*   PCP UR  Negative   THC UR  Negative         Results from last 7 days   Lab Units 03/04/20  2338 03/04/20  2329   BLOOD CULTURE  No Growth After 4 Days  No Growth After 4 Days     ED Treatment:   Medication Administration from 03/08/2020 0301 to 03/08/2020 0444       Date/Time Order Dose Route Action Comments     03/08/2020 0414 sodium chloride 0 9 % bolus 1,000 mL 1,000 mL Intravenous New Bag      03/08/2020 0430 LORazepam (ATIVAN) injection 0 5 mg 0 5 mg Intravenous Given         Past Medical History:   Diagnosis Date    Seizures ([facility] )      Present on Admission:   [facility]   Acute septic pulmonary embolism (HCC)   Staphylococcus aureus bacteremia   Hepatitis C   Transaminitis   Nicotine dependence      Admitting Diagnosis: Infection [B99 9]  Tachycardia [R00 0]  Septic pulmonary embolism (HCC) [I26 90]  MSSA bacteremia [R78 81]  Age/Sex: 35 y o  male  Admission Orders:  Scheduled Medications:    No current facility-administered medications for this encounter  Continuous IV Infusions:    No current facility-administered medications for this encounter  PRN Meds:    No current facility-administered medications for this encounter  IP CONSULT TO CASE MANAGEMENT    Network Utilization Review Department  [email or web address]  ATTENTION: Please call with any questions or concerns to [phone] and carefully listen to the prompts so that you are directed to the right person  All voicemails are confidential   Margret Mcdonald all requests for admission clinical reviews, approved or denied determinations and any other requests to dedicated fax number below belonging to the campus where the patient is receiving treatment   List of dedicated fax numbers for the Facilities:  [address] [place] [address] DENIALS (Administrative/Medical Necessity) [phone]   [address] (Maternity/NICU/Pediatrics) [phone]   Rupert Camacho [phone]   Justine Dale [phone]   Imer Mcguire [phone]   Nash Story [phone]   [address] [place] [address] [phone]   [facility]  [phone]   [address] [facility]  [address] [facility] [address] [facility] [phone]

## 2020-03-09 NOTE — SOCIAL WORK
Patient left AMA over the weekend  Pt is now re-admitted to P4  CM updated SWCM on P4 that OP IV Infusion orders are in [place]  Pt needs to choose if he's [facility]  Then [facility] to call the clinic and let pt know appt times

## 2020-03-09 NOTE — PROGRESS NOTES
INTERNAL MEDICINE RESIDENCY SENIOR ADMISSION NOTE     Name: James Marroquin   Age & Sex: 30 y o  male   MRN: [id number]  Unit/Bed#: [facility] 423-01   Encounter: [id number]  Primary Care Provider: No primary care provider on file  Patient seen and examined  Reviewed H&P per Dr Christina Bowman   Agree with the assessment and plan with any exception/addition as noted below:    Principal Problem:    Staphylococcus aureus bacteremia  Active Problems:    Opioid use disorder ([address] )    Acute septic pulmonary embolism (HCC)    Hepatitis C    Transaminitis    Nicotine dependence    History of anabolic steroid use      Code Status: Level 1 - Full Code  Admission Status: OBSERVATION  Disposition: Patient requires Med/Surg  Expected Length of Stay: NA

## 2020-03-09 NOTE — QUICK NOTE
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

## 2020-03-09 NOTE — SOCIAL WORK
This CM was alerted to pt's readmission by previous-covering CM Navi Benjamin  SOD-B resident Dr Ayush Saleem informed CM that pt is threatening to leave AMA once again, but is badly in need of daily infusion of IV ABX  Pt has hx of IV drug abuse and is not willing to remain hospitalized for 6 wks to receive treatment, nor be admitted to [facility] and [employer] [facility] program to received treatment and D&A counseling  CM contacted [facility] W Mario Rodriguez and scheduled pt to receive daily infusion of IV ABX via a peripheral line each day for the next 6 wks  Pt's 1st appt will be next day on Tuesday 3/10/20 @ 2:00PM     Pt is leaving A  CM to follow

## 2020-03-09 NOTE — DISCHARGE SUMMARY
INTERNAL MEDICINE RESIDENCY DISCHARGE SUMMARY     Erin Ceja   35 y o  male  MRN: [id number]  Room/Bed: [place] [address]/[address]    Encounter: [id number]    Principal Problem:    Staphylococcus aureus bacteremia  Active Problems:    Opioid use disorder ([address] )    Acute septic pulmonary embolism (HCC)    Hepatitis C    Transaminitis    Nicotine dependence    History of anabolic steroid use      Hepatitis C  Assessment & Plan  Patient reports to having history of hepatitis C infection  Outpatient follow-up and treatment with GI  Acute septic pulmonary embolism [facility])  Assessment & Plan  Based on the CT scan findings from March 1st at [facility]  MAURA did not show any vegetations but there is still concern for tricuspid endocarditis causing septic emboli  Management with IV antibiotics as mentioned above  Opioid use disorder [facility])  Assessment & Plan  Admits to abusing heroin and cocaine before presenting to the hospital on March 3rd  Denies any IV drug use after leaving hospital AMA on 3/7, and returning in early AM of 3/8  Will continue same medication regimen for withdrawal with clonidine 0 3 mg Q 3 p r n , loperamide 4 mg Q 4 p r n , Valium 5 mg q 6 hours p r n    - Patient had received one dose of suboxone on 3/5 but further doses were held  - Plans to follow up at [facility] for intensive outpatient program    * Staphylococcus aureus bacteremia  Assessment & Plan  Patient came back to the hospital to complete his IV antibiotic course  Blood cultures from March 1st at [facility] grew MSSA  Recently hospitalized at [facility] for MSSA bacteremia and patient left on 3/7/20 for social reasons and came back few hours later  Stable on presentation  Heart rate 130 and WBC 11 1  Blood cultures from March 4 showed no growth at 48 hours    Concern for endocarditis although MAURA at [facility] did not show any vegetations  Plan:  · Will start patient back on IV cefazolin 2 mg q 8 hours  · Consider ID consult  · Trend fever and WBC count  · No need for repeat imaging or blood cultures        [address] [place] [address] Ave     Per Dr Elizabeth Zavaleta H&P from 3/8: "This 28-year-old male with past medical history of IV drug use, anxiety presented to the hospital to complete his IV antibiotic course for MSSA bacteremia      On presentation to ER, temperature was 98 2°, heart rate 130, respiratory rate 20, /81, oxygen 96% room air  Labs showed WBC 11 1, hemoglobin 17 7  No imaging studies done  Patient was admitted to complete the course of his IV antibiotics for MSSA bacteremia      Patient was recently hospitalized from March 4th to March 7th for MSSA bacteremia  Id was consulted and patient was started on cefazolin 2g IV q 8 hours  Patient requested to leave the hospital to take care of his [employer]  ID promised him that he will be discharged on 3/9/20 after 72 hours negative blood cultures and then continue antibiotic outpatient to accommodate his request but patient left AMA on 3/7/20  Before presenting to [facility] on March 3rd, patient was at [facility] for similar complaint but also left AMA     After leaving hospital yesterday, patient denies any IV drug use  Patient admits that he spent time with family and he had a couple of alcoholic drinks  Patient denies any chest pain, shortness of breath, abdominal pain, nausea or vomiting  Admits to feeling fine and would like to complete his antibiotic course "    During his admission, patient stated that he continued to have some lower chest pain which he noted when his septic pulmonary embolus was diagnosed  However, he has otherwise been feeling well with no fevers or chills  He was eager to continue with the plan as previously stated, wherein he would be discharged in the morning of 3/9 as agreed upon with infectious disease service  On the morning of 3/9, the patient stated that he had an appointment at his [employer] shop at 9:00 a m  And needed to leave by then  It was explained to the patient that it was recommended that he get a dose of antibiotics here before he left, since his 1st infusion appointment will be 3/10  Patient refused this so signed out AMA  He plans to present his infusion appointment at 2:00 p m  On 03/10 for daptomycin  Subjective:  Patient seen examined on day of discharge  He denies any complaints  He reports he is feeling well and has no fever or chills  He is insistent that he needs to leave as above  Objective:   Vitals:    03/08/20 2003 03/08/20 2317 03/09/20 0247 03/09/20 0701   BP: 162/100 (!) 176/98 158/93 131/63   BP Location: Right arm Right arm Right arm Right arm   Pulse: 104 (!) 114 91 100   Resp: 18 18 18 18   Temp: 98 5 °F (36 9 °C) 98 4 °F (36 9 °C) 98 3 °F (36 8 °C) 98 3 °F (36 8 °C)   TempSrc: Oral Oral Oral Oral   SpO2: 96% 98% 97% 99%   Weight:       Height:         Physical Exam   Constitutional: He is oriented to person, place, and time  He appears well-developed and well-nourished  No distress  HENT:   Head: Normocephalic and atraumatic  Eyes: Conjunctivae and EOM are normal    Pulmonary/Chest: Effort normal  No respiratory distress  Abdominal: He exhibits no distension  Musculoskeletal: He exhibits no edema or deformity  Neurological: He is alert and oriented to person, place, and time  Skin: Skin is warm and dry  Psychiatric: He has a normal mood and affect   His behavior is normal        DISCHARGE INFORMATION     PCP at Discharge: None currently    Admitting Provider: Holli Gomez MD  Admission Date: 3/8/2020    Discharge Provider: No att  providers found  Discharge Date: 3/9/2020    Discharge Disposition: Left against medical advice or discontinued care  Discharge Condition: stable  Discharge with Lines: no    Discharge Diet: regular diet  Activity Restrictions: none, recommended to patient to discontinue anabolic steroids  Test Results Pending at Discharge: Final blood cultures; negative at 4 days so far    Discharge Diagnoses:  Principal Problem:    Staphylococcus aureus bacteremia  Active Problems:    Opioid use disorder ([address] )    Acute septic pulmonary embolism (HCC)    Hepatitis C    Transaminitis    Nicotine dependence    History of anabolic steroid use  Resolved Problems:    * No resolved hospital problems  *      Consulting Providers:  Infectious diseases    Diagnostic & Therapeutic Procedures Performed:  No results found  Code Status: Level 1 - Full Code  Advance Directive & Living Will: <no information>  Power of :    POLST:      Medications:  Discharge Medication List as of 3/9/2020 10:45 AM        Discharge Medication List as of 3/9/2020 10:45 AM        Discharge Medication List as of 3/9/2020 10:45 AM      CONTINUE these medications which have NOT CHANGED    Details   ALPRAZolam (XANAX) 0 5 mg tablet Take 0 5 mg by mouth 3 (three) times a day as needed for anxiety, Historical Med      gabapentin (NEURONTIN) 300 mg capsule Take 1 capsule (300 mg total) by mouth 3 (three) times a day for 2 doses, Starting Sat 3/7/2020, Until Sun 3/8/2020, Normal             Allergies:  No Known Allergies    FOLLOW-UP     PCP Outpatient Follow-up:  Pt will need to [facility] care    Consulting Providers Follow-up: With infectious diseases, can discuss linezolid     Active Issues Requiring Follow-up:   MSSA bacteremia, presumed endocarditis    Discharge Statement:   I spent 30 minutes minutes discharging the patient  This time was spent on the day of discharge  I had direct contact with the patient on the day of discharge  Additional documentation is required if more than 30 minutes were spent on discharge  Portions of the record may have been created with voice recognition software    Occasional wrong word or "sound a like" substitutions may have occurred due to the inherent limitations of voice recognition software    Read the chart carefully and recognize, using context, where substitutions have occurred     ==  Anju Asher, [facility]  Internal Medicine Resident PGY-2

## 2020-03-10 ENCOUNTER — TELEPHONE (OUTPATIENT)
Dept: INFECTIOUS DISEASES | Facility: CLINIC | Age: 30
End: 2020-03-10

## 2020-03-10 ENCOUNTER — HOSPITAL ENCOUNTER (OUTPATIENT)
Dept: INFUSION CENTER | Facility: CLINIC | Age: 30
Discharge: HOME | End: 2020-03-10
Payer: COMMERCIAL

## 2020-03-10 VITALS
HEART RATE: 93 BPM | TEMPERATURE: 97.4 F | DIASTOLIC BLOOD PRESSURE: 80 MMHG | SYSTOLIC BLOOD PRESSURE: 120 MMHG | RESPIRATION RATE: 18 BRPM

## 2020-03-10 DIAGNOSIS — I33.0 ACUTE BACTERIAL ENDOCARDITIS: Primary | ICD-10-CM

## 2020-03-10 DIAGNOSIS — B95.61 STAPHYLOCOCCUS AUREUS BACTEREMIA: ICD-10-CM

## 2020-03-10 DIAGNOSIS — R78.81 STAPHYLOCOCCUS AUREUS BACTEREMIA: ICD-10-CM

## 2020-03-10 LAB
ALBUMIN SERPL BCP-MCNC: 3.6 G/DL (ref 3.5–5.7)
ALP SERPL-CCNC: 86 U/L (ref 46–116)
ALT SERPL W P-5'-P-CCNC: 51 U/L (ref 12–78)
ANION GAP SERPL CALCULATED.3IONS-SCNC: 11 MMOL/L (ref 4–13)
AST SERPL W P-5'-P-CCNC: 45 U/L (ref 5–45)
BACTERIA BLD CULT: NORMAL
BACTERIA BLD CULT: NORMAL
BASOPHILS # BLD AUTO: 0.02 THOUSANDS/ΜL (ref 0–0.1)
BASOPHILS NFR BLD AUTO: 0 % (ref 0–1)
BILIRUB SERPL-MCNC: 0.5 MG/DL (ref 0.2–1)
BUN SERPL-MCNC: 21 MG/DL (ref 5–25)
CALCIUM SERPL-MCNC: 9.5 MG/DL (ref 8.3–10.1)
CHLORIDE SERPL-SCNC: 104 MMOL/L (ref 98–108)
CK MB SERPL-MCNC: 1 % (ref 0–2.5)
CK MB SERPL-MCNC: 3.5 NG/ML (ref 0–5)
CK SERPL-CCNC: 358 U/L (ref 39–308)
CO2 SERPL-SCNC: 27 MMOL/L (ref 21–32)
CREAT SERPL-MCNC: 0.9 MG/DL (ref 0.6–1.3)
EOSINOPHIL # BLD AUTO: 0.29 THOUSAND/ΜL (ref 0–0.61)
EOSINOPHIL NFR BLD AUTO: 3 % (ref 0–6)
ERYTHROCYTE [DISTWIDTH] IN BLOOD BY AUTOMATED COUNT: 11.9 % (ref 11.6–15.1)
GFR SERPL CREATININE-BSD FRML MDRD: 115 ML/MIN/1.73SQ M
GLUCOSE SERPL-MCNC: 165 MG/DL (ref 65–140)
HCT VFR BLD AUTO: 44.8 % (ref 36.5–49.3)
HGB BLD-MCNC: 15.4 G/DL (ref 12–17)
LYMPHOCYTES # BLD AUTO: 2.23 THOUSANDS/ΜL (ref 0.6–4.47)
LYMPHOCYTES NFR BLD AUTO: 25 % (ref 14–44)
MCH RBC QN AUTO: 29.4 PG (ref 26.8–34.3)
MCHC RBC AUTO-ENTMCNC: 34.4 G/DL (ref 31.4–37.4)
MCV RBC AUTO: 86 FL (ref 82–98)
MONOCYTES # BLD AUTO: 0.83 THOUSAND/ΜL (ref 0.17–1.22)
MONOCYTES NFR BLD AUTO: 9 % (ref 4–12)
NEUTROPHILS # BLD AUTO: 5.54 THOUSANDS/ΜL (ref 1.85–7.62)
NEUTS SEG NFR BLD AUTO: 63 % (ref 43–75)
PLATELET # BLD AUTO: 289 THOUSANDS/UL (ref 149–390)
PMV BLD AUTO: 8.8 FL (ref 8.9–12.7)
POTASSIUM SERPL-SCNC: 4.1 MMOL/L (ref 3.5–5.3)
PROT SERPL-MCNC: 7 G/DL (ref 6.4–8.2)
RBC # BLD AUTO: 5.24 MILLION/UL (ref 3.88–5.62)
SODIUM SERPL-SCNC: 142 MMOL/L (ref 136–145)
WBC # BLD AUTO: 8.91 THOUSAND/UL (ref 4.31–10.16)

## 2020-03-10 PROCEDURE — 96365 THER/PROPH/DIAG IV INF INIT: CPT

## 2020-03-10 PROCEDURE — 80053 COMPREHEN METABOLIC PANEL: CPT | Performed by: INTERNAL MEDICINE

## 2020-03-10 PROCEDURE — 82550 ASSAY OF CK (CPK): CPT | Performed by: INTERNAL MEDICINE

## 2020-03-10 PROCEDURE — 85025 COMPLETE CBC W/AUTO DIFF WBC: CPT | Performed by: INTERNAL MEDICINE

## 2020-03-10 PROCEDURE — 82553 CREATINE MB FRACTION: CPT | Performed by: INTERNAL MEDICINE

## 2020-03-10 RX ADMIN — DAPTOMYCIN 650 MG: 500 INJECTION, POWDER, LYOPHILIZED, FOR SOLUTION INTRAVENOUS at 14:50

## 2020-03-10 NOTE — TELEPHONE ENCOUNTER
Spoke to patient to confirm follow up appt scheduled for 3/23 at 10a  Patient confirmed this appt and confirmed that he will be going to his infusion today

## 2020-03-10 NOTE — PROGRESS NOTES
Pt tolerated treatment today without incident  Pt was provided with AVS and is aware of appt tomorrow at 11:30

## 2020-03-10 NOTE — PLAN OF CARE
Problem: Potential for Falls  Goal: Patient will remain free of falls  Description  INTERVENTIONS:  - Assess patient frequently for physical needs  -  Identify cognitive and physical deficits and behaviors that affect risk of falls    -  [place] fall precautions as indicated by assessment   - Educate patient/family on patient safety including physical limitations  - Instruct patient to call for assistance with activity based on assessment  - Modify environment to reduce risk of injury  - Consider OT/PT consult to assist with strengthening/mobility  Outcome: Progressing

## 2020-03-10 NOTE — UTILIZATION REVIEW
Notification of Discharge  This is a Notification of Discharge from our facility [address]  Please be advised that this patient has been discharge from our facility  Below you will find the admission and discharge date and time including the patients disposition  PRESENTATION DATE: 3/8/2020  3:26 AM  OBS ADMISSION DATE:   IP ADMISSION DATE: 3/8/20 0423   DISCHARGE DATE: 3/9/2020 10:45 AM  DISPOSITION: Left against medical advice or discontinued care Left against medical advice or discontinued care   Admission Orders listed below:  Admission Orders (From admission, onward)     Ordered        03/08/20 0423  Inpatient Admission  Once                   Please contact the UR Department if additional information is required to close this patient's authorization/case  [address] Utilization Review Department  Main: [phone] x carefully listen to the prompts  All voicemails are confidential   [email or web address]  Send all requests for admission clinical reviews, approved or denied determinations and any other requests to dedicated fax number below belonging to the campus where the patient is receiving treatment   List of dedicated fax numbers:  [address] DENIALS (Administrative/Medical Necessity) [phone]   [address] (Maternity/NICU/Pediatrics) [phone]   Deneise Notice [phone]   Gina Carey [phone]   Humberto Melendez [phone]   Ana Luisa Reed[address] [phone]   [facility]  [phone]   [address] [facility] [address]  [address] [place] [address] [address] [facility] [phone]

## 2020-03-11 ENCOUNTER — HOSPITAL ENCOUNTER (OUTPATIENT)
Dept: INFUSION CENTER | Facility: CLINIC | Age: 30
Discharge: HOME | End: 2020-03-11
Payer: COMMERCIAL

## 2020-03-11 VITALS
HEART RATE: 108 BPM | RESPIRATION RATE: 18 BRPM | TEMPERATURE: 97.7 F | SYSTOLIC BLOOD PRESSURE: 132 MMHG | DIASTOLIC BLOOD PRESSURE: 88 MMHG

## 2020-03-11 DIAGNOSIS — I33.0 ACUTE BACTERIAL ENDOCARDITIS: Primary | ICD-10-CM

## 2020-03-11 DIAGNOSIS — R78.81 STAPHYLOCOCCUS AUREUS BACTEREMIA: ICD-10-CM

## 2020-03-11 DIAGNOSIS — B95.61 STAPHYLOCOCCUS AUREUS BACTEREMIA: ICD-10-CM

## 2020-03-11 PROCEDURE — 96365 THER/PROPH/DIAG IV INF INIT: CPT

## 2020-03-11 RX ADMIN — DAPTOMYCIN 650 MG: 500 INJECTION, POWDER, LYOPHILIZED, FOR SOLUTION INTRAVENOUS at 12:40

## 2020-03-11 NOTE — PROGRESS NOTES
Patient to the unit for IV antibiotics  Voices no new concerns today  Tolerated infusion without adverse reaction  Declined AVS, aware of appointment tomorrow at 1300 which was changed from 1400  Voices no questions or concerns at discharge

## 2020-03-11 NOTE — PLAN OF CARE
Problem: PAIN - ADULT  Goal: Verbalizes/displays adequate comfort level or baseline comfort level  Description  Interventions:  - Encourage patient to monitor pain and request assistance  - Assess pain using appropriate pain scale  - Administer analgesics based on type and severity of pain and evaluate response  - Implement non-pharmacological measures as appropriate and evaluate response  - Consider cultural and social influences on pain and pain management  - Notify physician/advanced practitioner if interventions unsuccessful or patient reports new pain  Outcome: Progressing     Problem: SAFETY ADULT  Goal: Patient will remain free of falls  Description  INTERVENTIONS:  - Assess patient frequently for physical needs  -  Identify cognitive and physical deficits and behaviors that affect risk of falls    -  [place] fall precautions as indicated by assessment   - Educate patient/family on patient safety including physical limitations  - Instruct patient to call for assistance with activity based on assessment  - Modify environment to reduce risk of injury  - Consider OT/PT consult to assist with strengthening/mobility  Outcome: Progressing     Problem: DISCHARGE PLANNING  Goal: Discharge to home or other facility with appropriate resources  Description  INTERVENTIONS:  - Identify barriers to discharge w/patient and caregiver  - Arrange for needed discharge resources and transportation as appropriate  - Identify discharge learning needs (meds, wound care, etc )  - Arrange for interpretive services to assist at discharge as needed  - Refer to Case Management Department for coordinating discharge planning if the patient needs post-hospital services based on physician/advanced practitioner order or complex needs related to functional status, cognitive ability, or social support system  Outcome: Progressing     Problem: Knowledge Deficit  Goal: Patient/family/caregiver demonstrates understanding of disease process, treatment plan, medications, and discharge instructions  Description  Complete learning assessment and assess knowledge base    Interventions:  - Provide teaching at level of understanding  - Provide teaching via preferred learning methods  Outcome: Progressing

## 2020-03-12 ENCOUNTER — HOSPITAL ENCOUNTER (OUTPATIENT)
Dept: INFUSION CENTER | Facility: CLINIC | Age: 30
Discharge: HOME | End: 2020-03-12
Payer: COMMERCIAL

## 2020-03-12 VITALS
SYSTOLIC BLOOD PRESSURE: 141 MMHG | DIASTOLIC BLOOD PRESSURE: 74 MMHG | RESPIRATION RATE: 18 BRPM | HEART RATE: 98 BPM | TEMPERATURE: 98.3 F

## 2020-03-12 DIAGNOSIS — R78.81 STAPHYLOCOCCUS AUREUS BACTEREMIA: ICD-10-CM

## 2020-03-12 DIAGNOSIS — B95.61 STAPHYLOCOCCUS AUREUS BACTEREMIA: ICD-10-CM

## 2020-03-12 DIAGNOSIS — I33.0 ACUTE BACTERIAL ENDOCARDITIS: Primary | ICD-10-CM

## 2020-03-12 PROCEDURE — 96365 THER/PROPH/DIAG IV INF INIT: CPT

## 2020-03-12 RX ADMIN — DAPTOMYCIN 650 MG: 500 INJECTION, POWDER, LYOPHILIZED, FOR SOLUTION INTRAVENOUS at 13:34

## 2020-03-13 ENCOUNTER — HOSPITAL ENCOUNTER (OUTPATIENT)
Dept: INFUSION CENTER | Facility: CLINIC | Age: 30
Discharge: HOME | End: 2020-03-13
Payer: COMMERCIAL

## 2020-03-13 VITALS
TEMPERATURE: 97.6 F | DIASTOLIC BLOOD PRESSURE: 83 MMHG | SYSTOLIC BLOOD PRESSURE: 134 MMHG | RESPIRATION RATE: 18 BRPM | HEART RATE: 116 BPM

## 2020-03-13 DIAGNOSIS — R78.81 STAPHYLOCOCCUS AUREUS BACTEREMIA: ICD-10-CM

## 2020-03-13 DIAGNOSIS — B95.61 STAPHYLOCOCCUS AUREUS BACTEREMIA: ICD-10-CM

## 2020-03-13 DIAGNOSIS — I33.0 ACUTE BACTERIAL ENDOCARDITIS: Primary | ICD-10-CM

## 2020-03-13 PROCEDURE — 96365 THER/PROPH/DIAG IV INF INIT: CPT

## 2020-03-13 RX ADMIN — DAPTOMYCIN 650 MG: 500 INJECTION, POWDER, LYOPHILIZED, FOR SOLUTION INTRAVENOUS at 15:04

## 2020-03-13 NOTE — PLAN OF CARE
Problem: PAIN - ADULT  Goal: Verbalizes/displays adequate comfort level or baseline comfort level  Description  Interventions:  - Encourage patient to monitor pain and request assistance  - Assess pain using appropriate pain scale  - Administer analgesics based on type and severity of pain and evaluate response  - Implement non-pharmacological measures as appropriate and evaluate response  - Consider cultural and social influences on pain and pain management  - Notify physician/advanced practitioner if interventions unsuccessful or patient reports new pain  Outcome: Progressing     Problem: INFECTION - ADULT  Goal: Absence or prevention of progression during hospitalization  Description  INTERVENTIONS:  - Assess and monitor for signs and symptoms of infection  - Monitor lab/diagnostic results  - Monitor all insertion sites, i e  indwelling lines, tubes, and drains  - Monitor endotracheal if appropriate and nasal secretions for changes in amount and color  - [place] appropriate cooling/warming therapies per order  - Administer medications as ordered  - Instruct and encourage patient and family to use good hand hygiene technique  - Identify and instruct in appropriate isolation precautions for identified infection/condition  Outcome: Progressing  Goal: Absence of fever/infection during neutropenic period  Description  INTERVENTIONS:  - Monitor WBC    Outcome: Progressing     Problem: Knowledge Deficit  Goal: Patient/family/caregiver demonstrates understanding of disease process, treatment plan, medications, and discharge instructions  Description  Complete learning assessment and assess knowledge base    Interventions:  - Provide teaching at level of understanding  - Provide teaching via preferred learning methods  Outcome: Progressing

## 2020-03-13 NOTE — PROGRESS NOTES
Pt  Denies new symptoms or concerns today  Pt  Tolerated Daptomycin without adverse event  Pt  Will continue with daily abx as ordered    Pt  Declined AVS

## 2020-03-15 ENCOUNTER — DOCUMENTATION (OUTPATIENT)
Dept: MEDSURG UNIT | Facility: HOSPITAL | Age: 30
End: 2020-03-15

## 2020-03-15 ENCOUNTER — HOSPITAL ENCOUNTER (OUTPATIENT)
Dept: INFUSION CENTER | Facility: CLINIC | Age: 30
Discharge: HOME | End: 2020-03-15
Payer: COMMERCIAL

## 2020-03-15 VITALS
SYSTOLIC BLOOD PRESSURE: 164 MMHG | DIASTOLIC BLOOD PRESSURE: 82 MMHG | RESPIRATION RATE: 18 BRPM | TEMPERATURE: 96.7 F | HEART RATE: 112 BPM

## 2020-03-15 DIAGNOSIS — B95.61 STAPHYLOCOCCUS AUREUS BACTEREMIA: ICD-10-CM

## 2020-03-15 DIAGNOSIS — R78.81 STAPHYLOCOCCUS AUREUS BACTEREMIA: ICD-10-CM

## 2020-03-15 DIAGNOSIS — I33.0 ACUTE BACTERIAL ENDOCARDITIS: Primary | ICD-10-CM

## 2020-03-15 PROCEDURE — 96365 THER/PROPH/DIAG IV INF INIT: CPT

## 2020-03-15 RX ADMIN — DAPTOMYCIN 650 MG: 500 INJECTION, POWDER, LYOPHILIZED, FOR SOLUTION INTRAVENOUS at 11:10

## 2020-03-15 NOTE — PROGRESS NOTES
Received call from pharmacy at [place] [address] that pt is at the hospital for his antibiotic  Pt states he received a call this morning stating he should report to [facility] for his antibiotic  Spoke with pt who states he will come to infusion center now for his antibiotic  Carrie Frazier from [address] [place] [address] aware

## 2020-03-15 NOTE — PROGRESS NOTES
Patient arrived to [facility] for infusion  He stated that he wanted to go get his phone  and never returned to unit  After 40 minutes the supervisor and pharmacy was notified  The pharmacist stated that he had called the infusion center at [place] and would be going there for his infusion

## 2020-03-15 NOTE — PLAN OF CARE
Problem: Potential for Falls  Goal: Patient will remain free of falls  Description  INTERVENTIONS:  - Assess patient frequently for physical needs  -  Identify cognitive and physical deficits and behaviors that affect risk of falls    -  [place] fall precautions as indicated by assessment   - Educate patient/family on patient safety including physical limitations  - Instruct patient to call for assistance with activity based on assessment  - Modify environment to reduce risk of injury  - Consider OT/PT consult to assist with strengthening/mobility  Outcome: Progressing

## 2020-03-16 ENCOUNTER — HOSPITAL ENCOUNTER (OUTPATIENT)
Dept: INFUSION CENTER | Facility: CLINIC | Age: 30
Discharge: HOME | End: 2020-03-16
Payer: COMMERCIAL

## 2020-03-16 VITALS
SYSTOLIC BLOOD PRESSURE: 176 MMHG | DIASTOLIC BLOOD PRESSURE: 78 MMHG | HEART RATE: 112 BPM | TEMPERATURE: 97 F | RESPIRATION RATE: 18 BRPM

## 2020-03-16 DIAGNOSIS — I33.0 ACUTE BACTERIAL ENDOCARDITIS: Primary | ICD-10-CM

## 2020-03-16 DIAGNOSIS — R78.81 STAPHYLOCOCCUS AUREUS BACTEREMIA: ICD-10-CM

## 2020-03-16 DIAGNOSIS — B95.61 STAPHYLOCOCCUS AUREUS BACTEREMIA: ICD-10-CM

## 2020-03-16 PROCEDURE — 96365 THER/PROPH/DIAG IV INF INIT: CPT

## 2020-03-16 RX ADMIN — DAPTOMYCIN 650 MG: 500 INJECTION, POWDER, LYOPHILIZED, FOR SOLUTION INTRAVENOUS at 12:30

## 2020-03-16 NOTE — PROGRESS NOTES
Pt without complaint, received IV antbx as ordered and tolerated well  Pt left unit in stable condition, declines AVS  Pt was instructed in time of tomorrow's appt

## 2020-03-16 NOTE — PLAN OF CARE
Problem: Potential for Falls  Goal: Patient will remain free of falls  Description  INTERVENTIONS:  - Assess patient frequently for physical needs  -  Identify cognitive and physical deficits and behaviors that affect risk of falls    -  [place] fall precautions as indicated by assessment   - Educate patient/family on patient safety including physical limitations  - Instruct patient to call for assistance with activity based on assessment  - Modify environment to reduce risk of injury  - Consider OT/PT consult to assist with strengthening/mobility  Outcome: Progressing

## 2020-03-17 ENCOUNTER — HOSPITAL ENCOUNTER (OUTPATIENT)
Dept: INFUSION CENTER | Facility: CLINIC | Age: 30
Discharge: HOME | End: 2020-03-17
Payer: COMMERCIAL

## 2020-03-17 VITALS
DIASTOLIC BLOOD PRESSURE: 70 MMHG | SYSTOLIC BLOOD PRESSURE: 151 MMHG | TEMPERATURE: 96.7 F | RESPIRATION RATE: 16 BRPM | HEART RATE: 96 BPM

## 2020-03-17 DIAGNOSIS — I33.0 ACUTE BACTERIAL ENDOCARDITIS: Primary | ICD-10-CM

## 2020-03-17 DIAGNOSIS — B95.61 STAPHYLOCOCCUS AUREUS BACTEREMIA: ICD-10-CM

## 2020-03-17 DIAGNOSIS — R78.81 STAPHYLOCOCCUS AUREUS BACTEREMIA: ICD-10-CM

## 2020-03-17 PROCEDURE — 96365 THER/PROPH/DIAG IV INF INIT: CPT

## 2020-03-17 RX ADMIN — DAPTOMYCIN 650 MG: 500 INJECTION, POWDER, LYOPHILIZED, FOR SOLUTION INTRAVENOUS at 14:51

## 2020-03-17 NOTE — PLAN OF CARE
Problem: PAIN - ADULT  Goal: Verbalizes/displays adequate comfort level or baseline comfort level  Description  Interventions:  - Encourage patient to monitor pain and request assistance  - Assess pain using appropriate pain scale  - Administer analgesics based on type and severity of pain and evaluate response  - Implement non-pharmacological measures as appropriate and evaluate response  - Consider cultural and social influences on pain and pain management  - Notify physician/advanced practitioner if interventions unsuccessful or patient reports new pain  Outcome: Progressing     Problem: INFECTION - ADULT  Goal: Absence or prevention of progression during hospitalization  Description  INTERVENTIONS:  - Assess and monitor for signs and symptoms of infection  - Monitor lab/diagnostic results  - Monitor all insertion sites, i e  indwelling lines, tubes, and drains  - Monitor endotracheal if appropriate and nasal secretions for changes in amount and color  - [place] appropriate cooling/warming therapies per order  - Administer medications as ordered  - Instruct and encourage patient and family to use good hand hygiene technique  - Identify and instruct in appropriate isolation precautions for identified infection/condition  Outcome: Progressing  Goal: Absence of fever/infection during neutropenic period  Description  INTERVENTIONS:  - Monitor WBC    Outcome: Progressing     Problem: Knowledge Deficit  Goal: Patient/family/caregiver demonstrates understanding of disease process, treatment plan, medications, and discharge instructions  Description  Complete learning assessment and assess knowledge base    Interventions:  - Provide teaching at level of understanding  - Provide teaching via preferred learning methods  Outcome: Progressing

## 2020-03-18 ENCOUNTER — HOSPITAL ENCOUNTER (OUTPATIENT)
Dept: INFUSION CENTER | Facility: CLINIC | Age: 30
Discharge: HOME | End: 2020-03-18
Payer: COMMERCIAL

## 2020-03-18 VITALS
DIASTOLIC BLOOD PRESSURE: 105 MMHG | RESPIRATION RATE: 18 BRPM | TEMPERATURE: 97.9 F | SYSTOLIC BLOOD PRESSURE: 160 MMHG | HEART RATE: 110 BPM

## 2020-03-18 DIAGNOSIS — R78.81 STAPHYLOCOCCUS AUREUS BACTEREMIA: ICD-10-CM

## 2020-03-18 DIAGNOSIS — B95.61 STAPHYLOCOCCUS AUREUS BACTEREMIA: ICD-10-CM

## 2020-03-18 DIAGNOSIS — I33.0 ACUTE BACTERIAL ENDOCARDITIS: Primary | ICD-10-CM

## 2020-03-18 LAB
ALBUMIN SERPL BCP-MCNC: 3.7 G/DL (ref 3.5–5.7)
ALP SERPL-CCNC: 75 U/L (ref 46–116)
ALT SERPL W P-5'-P-CCNC: 41 U/L (ref 12–78)
ANION GAP SERPL CALCULATED.3IONS-SCNC: 11 MMOL/L (ref 4–13)
AST SERPL W P-5'-P-CCNC: 42 U/L (ref 5–45)
BASOPHILS # BLD MANUAL: 0 THOUSAND/UL (ref 0–0.1)
BASOPHILS NFR MAR MANUAL: 0 % (ref 0–1)
BILIRUB SERPL-MCNC: 0.6 MG/DL (ref 0.2–1)
BUN SERPL-MCNC: 10 MG/DL (ref 5–25)
CALCIUM SERPL-MCNC: 9.3 MG/DL (ref 8.3–10.1)
CHLORIDE SERPL-SCNC: 101 MMOL/L (ref 98–108)
CK MB SERPL-MCNC: 1.8 % (ref 0–2.5)
CK MB SERPL-MCNC: 4.5 NG/ML (ref 0–5)
CK SERPL-CCNC: 256 U/L (ref 39–308)
CO2 SERPL-SCNC: 27 MMOL/L (ref 21–32)
CREAT SERPL-MCNC: 1 MG/DL (ref 0.6–1.3)
EOSINOPHIL # BLD MANUAL: 0.31 THOUSAND/UL (ref 0–0.4)
EOSINOPHIL NFR BLD MANUAL: 3 % (ref 0–6)
ERYTHROCYTE [DISTWIDTH] IN BLOOD BY AUTOMATED COUNT: 11.7 % (ref 11.6–15.1)
GFR SERPL CREATININE-BSD FRML MDRD: 101 ML/MIN/1.73SQ M
GLUCOSE SERPL-MCNC: 99 MG/DL (ref 65–140)
HCT VFR BLD AUTO: 41.3 % (ref 36.5–49.3)
HGB BLD-MCNC: 14.4 G/DL (ref 12–17)
LYMPHOCYTES # BLD AUTO: 1.87 THOUSAND/UL (ref 0.6–4.47)
LYMPHOCYTES # BLD AUTO: 18 % (ref 14–44)
MCH RBC QN AUTO: 29.5 PG (ref 26.8–34.3)
MCHC RBC AUTO-ENTMCNC: 34.9 G/DL (ref 31.4–37.4)
MCV RBC AUTO: 85 FL (ref 82–98)
MONOCYTES # BLD AUTO: 0.62 THOUSAND/UL (ref 0–1.22)
MONOCYTES NFR BLD: 6 % (ref 4–12)
NEUTROPHILS # BLD MANUAL: 7.59 THOUSAND/UL (ref 1.85–7.62)
NEUTS BAND NFR BLD MANUAL: 1 % (ref 0–8)
NEUTS SEG NFR BLD AUTO: 72 % (ref 43–75)
PLATELET # BLD AUTO: 270 THOUSANDS/UL (ref 149–390)
PLATELET BLD QL SMEAR: ADEQUATE
PMV BLD AUTO: 8.6 FL (ref 8.9–12.7)
POTASSIUM SERPL-SCNC: 3.6 MMOL/L (ref 3.5–5.3)
PROT SERPL-MCNC: 6.9 G/DL (ref 6.4–8.2)
RBC # BLD AUTO: 4.88 MILLION/UL (ref 3.88–5.62)
RBC MORPH BLD: NORMAL
SODIUM SERPL-SCNC: 139 MMOL/L (ref 136–145)
TOTAL CELLS COUNTED SPEC: 100
WBC # BLD AUTO: 10.4 THOUSAND/UL (ref 4.31–10.16)

## 2020-03-18 PROCEDURE — 80053 COMPREHEN METABOLIC PANEL: CPT | Performed by: INTERNAL MEDICINE

## 2020-03-18 PROCEDURE — 85027 COMPLETE CBC AUTOMATED: CPT | Performed by: INTERNAL MEDICINE

## 2020-03-18 PROCEDURE — 82553 CREATINE MB FRACTION: CPT | Performed by: INTERNAL MEDICINE

## 2020-03-18 PROCEDURE — 82550 ASSAY OF CK (CPK): CPT | Performed by: INTERNAL MEDICINE

## 2020-03-18 PROCEDURE — 96365 THER/PROPH/DIAG IV INF INIT: CPT

## 2020-03-18 PROCEDURE — 85007 BL SMEAR W/DIFF WBC COUNT: CPT | Performed by: INTERNAL MEDICINE

## 2020-03-18 RX ADMIN — DAPTOMYCIN 650 MG: 500 INJECTION, POWDER, LYOPHILIZED, FOR SOLUTION INTRAVENOUS at 14:44

## 2020-03-18 NOTE — PROGRESS NOTES
Received phone call from Cristal Tran  As per Cristal Tran, Dr Vinay Tavares would like pt to F/U with primary care MD re: elevated BP and HR  OK given to proceed with IV antibiotics as ordered  Will reassess VS tomorrow, pt again instructed to not take supplements  Pt verbalized understanding

## 2020-03-18 NOTE — PROGRESS NOTES
Pt arrived to unit in stable condition, Vital signs checked per protocol, UC=647, WI=126/92 with monitor  BP rechecked [id number]=497/100  Pt admits to taking "energy enhancing supplement" today prior to a home gym workout  Pt also admits to feeling fatigued over the past week and to having increased episodes of diarrhea for past two days, 2-4 episodes per day  Pt educated re: BRAT/bland diet, encouraged to increase po fluids and instructed to take imodium if needed  Pt also instructed to not take supplements at this time unless given OK by his physician  Pt verbalized understanding of all  Dr Jed Juarez notified of all via [facility]  Pt tolerating Daptomycin as ordered presently  Weekly labs were drawn today as well

## 2020-03-19 ENCOUNTER — TELEPHONE (OUTPATIENT)
Dept: INFECTIOUS DISEASES | Facility: CLINIC | Age: 30
End: 2020-03-19

## 2020-03-19 ENCOUNTER — TELEPHONE (OUTPATIENT)
Dept: OTHER | Facility: HOSPITAL | Age: 30
End: 2020-03-19

## 2020-03-19 ENCOUNTER — HOSPITAL ENCOUNTER (OUTPATIENT)
Dept: INFUSION CENTER | Facility: CLINIC | Age: 30
Discharge: HOME | End: 2020-03-19

## 2020-03-19 NOTE — PROGRESS NOTES
Phone call with Dr Becky Carye to report patient visit and documentation from [facility] referring to patient overdosing yesterday admittedly after leaving [facility], for iv atibiotics  Dr Becky Carey gave verbal order to myself to stop treatment and no longer treat the patient  Pharmacist Beryl Neal discontinued the plan and sent it to Dr Becky Carey for sign off  Phone call to patient, to call back infusion, that we will no longer be treating him  Dr Becky Carey reports patient has f/u with him Monday  Huddled with all staff and informed of same

## 2020-03-19 NOTE — TELEPHONE ENCOUNTER
I spoke with patient over the phone today  Patient informed that he cannot receive IV antibiotic at infusion center anymore  Patient is asking about other treatment options  Will send script for linezolid 600 mg po q12h 2 weeks supply    Patient will follow up in ID office on Monday 3/23/2020

## 2020-03-19 NOTE — TELEPHONE ENCOUNTER
Attempted to call patient twice yesterday and this morning  Patient went to  ED yesterday for tachycardia  I discussed his case with [facility] infusion center nursing staff and manager  Due to concern for continuous IVDU, will stop his IV antibiotic  Other antibiotic option is linezolid po  If patient does not return my calls, will see patient in office next Monday to plan further antibiotics

## 2020-03-22 ENCOUNTER — HOSPITAL ENCOUNTER (EMERGENCY)
Facility: HOSPITAL | Age: 30
Discharge: LEFT BEFORE DISCHARGE | End: 2020-03-22
Attending: EMERGENCY MEDICINE | Admitting: EMERGENCY MEDICINE
Payer: COMMERCIAL

## 2020-03-22 VITALS
DIASTOLIC BLOOD PRESSURE: 66 MMHG | HEART RATE: 109 BPM | TEMPERATURE: 100.2 F | SYSTOLIC BLOOD PRESSURE: 150 MMHG | WEIGHT: 188.49 LBS | RESPIRATION RATE: 18 BRPM | OXYGEN SATURATION: 98 % | BODY MASS INDEX: 28.66 KG/M2

## 2020-03-22 DIAGNOSIS — T78.40XA ALLERGIC REACTION: Primary | ICD-10-CM

## 2020-03-22 LAB
ALBUMIN SERPL BCP-MCNC: 4.2 G/DL (ref 3.5–5)
ALP SERPL-CCNC: 117 U/L (ref 46–116)
ALT SERPL W P-5'-P-CCNC: 386 U/L (ref 12–78)
ANION GAP SERPL CALCULATED.3IONS-SCNC: 10 MMOL/L (ref 4–13)
AST SERPL W P-5'-P-CCNC: 333 U/L (ref 5–45)
BASOPHILS # BLD AUTO: 0.03 THOUSANDS/ΜL (ref 0–0.1)
BASOPHILS NFR BLD AUTO: 0 % (ref 0–1)
BILIRUB SERPL-MCNC: 1.02 MG/DL (ref 0.2–1)
BUN SERPL-MCNC: 15 MG/DL (ref 5–25)
CALCIUM SERPL-MCNC: 9 MG/DL (ref 8.3–10.1)
CHLORIDE SERPL-SCNC: 100 MMOL/L (ref 100–108)
CO2 SERPL-SCNC: 29 MMOL/L (ref 21–32)
CREAT SERPL-MCNC: 1.21 MG/DL (ref 0.6–1.3)
EOSINOPHIL # BLD AUTO: 0.23 THOUSAND/ΜL (ref 0–0.61)
EOSINOPHIL NFR BLD AUTO: 2 % (ref 0–6)
ERYTHROCYTE [DISTWIDTH] IN BLOOD BY AUTOMATED COUNT: 11.9 % (ref 11.6–15.1)
GFR SERPL CREATININE-BSD FRML MDRD: 80 ML/MIN/1.73SQ M
GLUCOSE SERPL-MCNC: 72 MG/DL (ref 65–140)
HCT VFR BLD AUTO: 50.9 % (ref 36.5–49.3)
HGB BLD-MCNC: 17.6 G/DL (ref 12–17)
IMM GRANULOCYTES # BLD AUTO: 0.06 THOUSAND/UL (ref 0–0.2)
IMM GRANULOCYTES NFR BLD AUTO: 1 % (ref 0–2)
LYMPHOCYTES # BLD AUTO: 0.77 THOUSANDS/ΜL (ref 0.6–4.47)
LYMPHOCYTES NFR BLD AUTO: 8 % (ref 14–44)
MCH RBC QN AUTO: 29.8 PG (ref 26.8–34.3)
MCHC RBC AUTO-ENTMCNC: 34.6 G/DL (ref 31.4–37.4)
MCV RBC AUTO: 86 FL (ref 82–98)
MONOCYTES # BLD AUTO: 0.17 THOUSAND/ΜL (ref 0.17–1.22)
MONOCYTES NFR BLD AUTO: 2 % (ref 4–12)
NEUTROPHILS # BLD AUTO: 8.31 THOUSANDS/ΜL (ref 1.85–7.62)
NEUTS SEG NFR BLD AUTO: 87 % (ref 43–75)
NRBC BLD AUTO-RTO: 0 /100 WBCS
PLATELET # BLD AUTO: 198 THOUSANDS/UL (ref 149–390)
PMV BLD AUTO: 9 FL (ref 8.9–12.7)
POTASSIUM SERPL-SCNC: 3.8 MMOL/L (ref 3.5–5.3)
PROT SERPL-MCNC: 8.7 G/DL (ref 6.4–8.2)
RBC # BLD AUTO: 5.9 MILLION/UL (ref 3.88–5.62)
SODIUM SERPL-SCNC: 139 MMOL/L (ref 136–145)
WBC # BLD AUTO: 9.57 THOUSAND/UL (ref 4.31–10.16)

## 2020-03-22 PROCEDURE — 96361 HYDRATE IV INFUSION ADD-ON: CPT

## 2020-03-22 PROCEDURE — 96372 THER/PROPH/DIAG INJ SC/IM: CPT

## 2020-03-22 PROCEDURE — 85025 COMPLETE CBC W/AUTO DIFF WBC: CPT | Performed by: EMERGENCY MEDICINE

## 2020-03-22 PROCEDURE — 96375 TX/PRO/DX INJ NEW DRUG ADDON: CPT

## 2020-03-22 PROCEDURE — 99284 EMERGENCY DEPT VISIT MOD MDM: CPT

## 2020-03-22 PROCEDURE — 36415 COLL VENOUS BLD VENIPUNCTURE: CPT | Performed by: EMERGENCY MEDICINE

## 2020-03-22 PROCEDURE — 99285 EMERGENCY DEPT VISIT HI MDM: CPT | Performed by: EMERGENCY MEDICINE

## 2020-03-22 PROCEDURE — 80053 COMPREHEN METABOLIC PANEL: CPT | Performed by: EMERGENCY MEDICINE

## 2020-03-22 PROCEDURE — 93005 ELECTROCARDIOGRAM TRACING: CPT

## 2020-03-22 PROCEDURE — 96374 THER/PROPH/DIAG INJ IV PUSH: CPT

## 2020-03-22 RX ORDER — EPINEPHRINE 1 MG/ML
0.3 INJECTION, SOLUTION, CONCENTRATE INTRAVENOUS ONCE
Status: COMPLETED | OUTPATIENT
Start: 2020-03-22 | End: 2020-03-22

## 2020-03-22 RX ORDER — LORAZEPAM 2 MG/ML
0.5 INJECTION INTRAMUSCULAR ONCE
Status: COMPLETED | OUTPATIENT
Start: 2020-03-22 | End: 2020-03-22

## 2020-03-22 RX ORDER — LINEZOLID 600 MG/1
600 TABLET, FILM COATED ORAL EVERY 12 HOURS SCHEDULED
COMMUNITY
End: 2020-03-23 | Stop reason: SDUPTHER

## 2020-03-22 RX ORDER — METHYLPREDNISOLONE SODIUM SUCCINATE 125 MG/2ML
125 INJECTION, POWDER, LYOPHILIZED, FOR SOLUTION INTRAMUSCULAR; INTRAVENOUS ONCE
Status: COMPLETED | OUTPATIENT
Start: 2020-03-22 | End: 2020-03-22

## 2020-03-22 RX ADMIN — FAMOTIDINE 20 MG: 10 INJECTION, SOLUTION INTRAVENOUS at 02:04

## 2020-03-22 RX ADMIN — SODIUM CHLORIDE 1000 ML: 0.9 INJECTION, SOLUTION INTRAVENOUS at 01:59

## 2020-03-22 RX ADMIN — METHYLPREDNISOLONE SODIUM SUCCINATE 125 MG: 125 INJECTION, POWDER, FOR SOLUTION INTRAMUSCULAR; INTRAVENOUS at 02:01

## 2020-03-22 RX ADMIN — EPINEPHRINE 0.3 MG: 1 INJECTION, SOLUTION, CONCENTRATE INTRAVENOUS at 02:09

## 2020-03-22 RX ADMIN — LORAZEPAM 0.5 MG: 2 INJECTION INTRAMUSCULAR; INTRAVENOUS at 02:19

## 2020-03-22 NOTE — ED NOTES
Pt ambulates to restroom, noted to be diaphoretic  Oral temp shows 100 2  MD made aware        Shreya Mccollum, RN  03/22/20 7814

## 2020-03-22 NOTE — ED NOTES
Patient resting in bed, family at bedside  Patient is refusing admission  MD is already aware and spoke with patient       Herberth Nice [address], [address] [facility]  03/22/20 7363

## 2020-03-22 NOTE — ED PROVIDER NOTES
History  Chief Complaint   Patient presents with    Allergic Reaction     Pt comes to ED for allergic reacation  Pt is on zyvox for for blood infection  Took dose tonight and shortly after states he was SOB and had hives over his body  Pt took 25mg PO benadryl at [facility] and given 50mg IV benadryl from EMS     HPI   Patient is a 72-year-old male that reports to the emergency department with anaphylactic reaction  He notes that he developed sudden-onset red rash on the chest, face, itchy along which shortness of breath that started immediately after taking his by mouth linezolid  Patient is on p o  Linezolid due to inability to have a PICC line because he is an IV drug abuser  Patient has a history of staph bacteremia  Currently managed by our Infectious Disease doctors, Dr Dada Poole  Patient also notes for the past several days he has had 5-6 episodes of watery diarrhea  Medical decision m[age]-year-old male, will discuss with Infectious Disease, likely admit  Discussed with patient, he is absolutely adamant that under no circumstances will he be admitted to the hospital   I told him this is very concerning especially given his heart rate and temperature that he could be dying right now from a dangerous blood borne infection or other  However, patient, again reports that he does not want to be admitted for any reason  Vane Brown insists on leaving against medical advice, despite my recommendation to remain for ongoing treatment  1: Capacity: I have determined that the patient has capacity to make the decision to leave against medical advice based on the following:   · A  Ability to express a choice: The patient is able to express his or her choice and communicate that choice  · B   Ability to understand relevant information: The patient is able to verbalize their diagnosis, understand information about the purpose of treatment, remember the information, and show that he or she can be part of the decision-making process  · C  Ability to appreciate the significance of the information and its consequences: The patient understands the consequences of treatment refusal and the risks and benefits of accepting or refusing treatment  · D  Ability to manipulate information: The patient is able to engage in reasoning as it applies to making treatment decisions   2: Psychiatric Consultation: There is not an indication to call psychiatry consultation to determine capacity   3  Alternative Treatment: I have discussed the recommended course of treatment and available alternatives  4  Risks: I have discussed the specific risks of that patient refusing treatment   5  Follow-up Care: I have discussed the follow-up care and encouraged the patient to see a primary doctor ASAP  6  ED Option: I have emphasized that the patient has the option to return to the ED at anytime and that we are always open  PRIOR NOTES----------     * Staphylococcus aureus bacteremia  Assessment & Plan  Patient came back to the hospital to complete his IV antibiotic course  Blood cultures from March 1st at [facility] grew MSSA  Recently hospitalized at [facility] for MSSA bacteremia and patient left on 3/7/20 for social reasons and came back few hours later  Stable on presentation  Heart rate 130 and WBC 11 1  Blood cultures from March 4 showed no growth at 48 hours  Concern for endocarditis although MAURA at [facility] did not show any vegetations      Plan:  · Will start patient back on IV cefazolin 2 mg q 8 hours  · Consider ID consult  · Trend fever and WBC count  · No need for repeat imaging or blood cultures     Hepatitis C  Assessment & Plan  Patient reports to having history of hepatitis C infection    Outpatient follow-up and treatment with GI      Acute septic pulmonary embolism [facility])  Assessment & Plan  Based on the CT scan findings from March 1st at [facility]  MAURA did not show any vegetations but there is still concern for tricuspid endocarditis causing septic emboli  Management with IV antibiotics as mentioned above      Opioid use disorder [facility])  Assessment & Plan  Admits to abusing heroin and cocaine before presenting to the hospital on March 3rd  Denies any IV drug use after leaving hospital yesterday  Will continue same medication regimen for withdrawal with clonidine 0 3 mg Q 3 p r n , loperamide 4 mg Q 4 p r n , Valium 5 mg q 6 hours p r n       Hiren Diaz MD   Physician   Infectious Disease   Quick Note   Signed   Date of Service:  3/9/2020 10:45 AM            Quick Note          Show:Clear all  [x]Manual[]Template[]Copied    Added by:  [x]Jatinder Tavares MD    []Raulver for details  Patient was not seen by me today as he left against medical advice before the consult was done  I recently saw the patient on 03/06/2020 for MSSA bacteremia, septic pulmonary emboli and 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  Patient is already set start daptomycin 650 mg IV Q 24 hours at [facility] infusion center for treatment of MSSA bacteremia and septic pulmonary emboli, possible tricuspid valve infective endocarditis  Patient needs to follow in ID office within 1-2 weeks  If plan for daily daptomycin through peripheral IV fails, or if difficulty finding daily IV access, will consider switching patient to p o  Linezolid or IV Dalbavancin           ----------------------------------         Specimen Collected on   Other2 - Blood (LAB) 3/1/2020 9:14 AM     Organism Antibiotic Method Susceptibility   **STAPHYLOCOCCUS AUREUS** PENICILLIN GRAM POSITIVE SUSCEPTIBILITY >=0 5: Resistant    ERYTHROMYCIN GRAM POSITIVE SUSCEPTIBILITY >=8: Resistant    TETRACYCLINE GRAM POSITIVE SUSCEPTIBILITY <=1: Susceptible    CLINDAMYCIN GRAM POSITIVE SUSCEPTIBILITY Resistant    Comment: This isolate is presumed to be resistant based on detection of inducible clindamycin resistance  RIFAMPIN GRAM POSITIVE SUSCEPTIBILITY <=0 5: Susceptible    Comment: Rifampin should not be used alone for antimicrobial therapy  TRIMETHOPRIM-SUL GRAM POSITIVE SUSCEPTIBILITY <=10: Susceptible    LINEZOLID GRAM POSITIVE SUSCEPTIBILITY 2: Susceptible    DAPTOMYCIN GRAM POSITIVE SUSCEPTIBILITY 0 25: Susceptible    OXACILLIN (NAFCILLIN) GRAM POSITIVE SUSCEPTIBILITY <=0 25: Susceptible    Comment: Oxacillin susceptible staphylococcus can be considered susceptible to parenteral and oral cephalosporins and beta-lactam/beta-lactamase inhibitor combinations  VANCOMYCIN GRAM POSITIVE SUSCEPTIBILITY 1: Susceptible       Prior to Admission Medications   Prescriptions Last Dose Informant Patient Reported? Taking? ALPRAZolam (XANAX) 0 5 mg tablet Not Taking at Unknown time Self Yes No   Sig: Take 0 5 mg by mouth 3 (three) times a day as needed for anxiety      Facility-Administered Medications: None       Past Medical History:   Diagnosis Date    Drug use     Seizures ([facility] [address] )        Past Surgical History:   Procedure Laterality Date    APPENDECTOMY         Family History   Problem Relation Age of Onset    No Known Problems Mother     No Known Problems Father      I have reviewed and agree with the history as documented  E-Cigarette/Vaping    E-Cigarette Use Former User      E-Cigarette/Vaping Substances     Social History     Tobacco Use    Smoking status: Current Every Day Smoker     Packs/day: 0 50     Types: Cigarettes    Smokeless tobacco: Never Used   Substance Use Topics    Alcohol use: Yes     Drinks per session: 3 or 4    Drug use: Yes     Types: Prescription, Heroin, Cocaine       Review of Systems   Respiratory: Positive for cough  Negative for chest tightness and shortness of breath  Skin: Positive for color change  All other systems reviewed and are negative  Physical Exam  Physical Exam   Constitutional: He is oriented to person, place, and time   He appears well-developed and well-nourished  HENT:   Head: Normocephalic and atraumatic  Eyes: Pupils are equal, round, and reactive to light  EOM are normal    Neck: Normal range of motion  Neck supple  Cardiovascular: Regular rhythm and normal heart sounds  No murmur heard  Patient tachycardic, no acute distress  Pulmonary/Chest: Effort normal and breath sounds normal  No respiratory distress  He has no wheezes  Abdominal: Soft  Bowel sounds are normal  He exhibits no distension  There is no tenderness  Musculoskeletal: Normal range of motion  He exhibits no edema or tenderness  Neurological: He is alert and oriented to person, place, and time  No cranial nerve deficit  Coordination normal    Skin: Skin is warm and dry  He is not diaphoretic  No erythema  Psychiatric: He has a normal mood and affect  His behavior is normal    Nursing note and vitals reviewed        Vital Signs  ED Triage Vitals   Temperature Pulse Respirations Blood Pressure SpO2   03/22/20 0142 03/22/20 0140 03/22/20 0140 03/22/20 0206 03/22/20 0140   98 6 °F (37 °C) 102 18 161/74 100 %      Temp Source Heart Rate Source Patient Position - Orthostatic VS BP Location FiO2 (%)   03/22/20 0142 03/22/20 0140 -- 03/22/20 0206 --   Oral Monitor  Left arm       Pain Score       03/22/20 0140       No Pain           Vitals:    03/22/20 0140 03/22/20 0206 03/22/20 0235 03/22/20 0302   BP:  161/74 152/66 150/66   Pulse: 102 (!) 106 (!) 114 (!) 109         Visual Acuity      ED Medications  Medications   diphenhydrAMINE (FOR EMS ONLY) (BENADRYL) injection 50 mg (0 mg Does not apply Given to EMS 3/22/20 0145)   methylPREDNISolone sodium succinate (Solu-MEDROL) injection 125 mg (125 mg Intravenous Given 3/22/20 0201)   EPINEPHrine PF (ADRENALIN) 1 mg/mL injection 0 3 mg (0 3 mg Intramuscular Given 3/22/20 0209)   sodium chloride 0 9 % bolus 1,000 mL (0 mL Intravenous Stopped 3/22/20 0359)   famotidine (PEPCID) injection 20 mg (20 mg Intravenous Given 3/22/20 0204) LORazepam (ATIVAN) injection 0 5 mg (0 5 mg Intravenous Given 3/22/20 0219)       Diagnostic Studies  Results Reviewed     Procedure Component Value Units Date/Time    Comprehensive metabolic panel [[id number]]  (Abnormal) Collected:  03/22/20 0157    Lab Status:  Final result Specimen:  Blood from Arm, Right Updated:  03/22/20 0314     Sodium 139 mmol/L      Potassium 3 8 mmol/L      Chloride 100 mmol/L      CO2 29 mmol/L      ANION GAP 10 mmol/L      BUN 15 mg/dL      Creatinine 1 21 mg/dL      Glucose 72 mg/dL      Calcium 9 0 mg/dL       U/L       U/L      Alkaline Phosphatase 117 U/L      Total Protein 8 7 g/dL      Albumin 4 2 g/dL      Total Bilirubin 1 02 mg/dL      eGFR 80 ml/min/1 73sq m     Narrative:       National Kidney Disease Foundation guidelines for Chronic Kidney Disease (CKD):     Stage 1 with normal or high GFR (GFR > 90 mL/min/1 73 square meters)    Stage 2 Mild CKD (GFR = 60-89 mL/min/1 73 square meters)    Stage 3A Moderate CKD (GFR = 45-59 mL/min/1 73 square meters)    Stage 3B Moderate CKD (GFR = 30-44 mL/min/1 73 square meters)    Stage 4 Severe CKD (GFR = 15-29 mL/min/1 73 square meters)    Stage 5 End Stage CKD (GFR <15 mL/min/1 73 square meters)  Note: GFR calculation is accurate only with a steady state creatinine    CBC and differential [[id number]]  (Abnormal) Collected:  03/22/20 0157    Lab Status:  Final result Specimen:  Blood from Arm, Right Updated:  03/22/20 0259     WBC 9 57 Thousand/uL      RBC 5 90 Million/uL      Hemoglobin 17 6 g/dL      Hematocrit 50 9 %      MCV 86 fL      MCH 29 8 pg      MCHC 34 6 g/dL      RDW 11 9 %      MPV 9 0 fL      Platelets 581 Thousands/uL      nRBC 0 /100 WBCs      Neutrophils Relative 87 %      Immat GRANS % 1 %      Lymphocytes Relative 8 %      Monocytes Relative 2 %      Eosinophils Relative 2 %      Basophils Relative 0 %      Neutrophils Absolute 8 31 Thousands/µL      Immature Grans Absolute 0 06 Thousand/uL Lymphocytes Absolute 0 77 Thousands/µL      Monocytes Absolute 0 17 Thousand/µL      Eosinophils Absolute 0 23 Thousand/µL      Basophils Absolute 0 03 Thousands/µL                  No orders to display              Procedures  Procedures         ED Course  ED Course as of Mar 27 0856   Sun Mar 22, 2020   0214 Spoke with Dr Raejean Lundborg, plan was to admit and treat with Vanc, but patient adamant that he does not want this  Wants to call his ID doctor in the AM          [address] will call the patient personally  MDM      Disposition  Final diagnoses: Allergic reaction     Time reflects when diagnosis was documented in both MDM as applicable and the Disposition within this note     Time User Action Codes Description Comment    3/22/2020  3:57 AM Zebedee Scale T Add [T78 40XA] Allergic reaction       ED Disposition     ED Disposition Condition Date/Time Comment    [place]  Fri Mar 27, 2020  8:56 AM Date: 3/27/2020  Patient: James Marroquin  Admitted: 3/22/2020  1:36 AM  Attending Provider: No att  providers found    James Marroquin or his authorized caregiver has made the decision for the patient to leave the emergency department against the  advice of the emergency department staff  He or his authorized caregiver has been informed and understands the inherent risks, including death  He or his authorized caregiver has decided to accept the responsibility for this decision  Cynthia lim and all necessary parties have been advised that he may return for further evaluation or treatment  His condition at time of discharge was     James Marroquin had current vital signs as follows:  /66 (BP Location: Left arm)   Pulse (!) 109    Temp 100 2 °F (37 9 °C) (Oral)   Resp 18   Wt 85 5 kg (188 lb 7 9 oz)         Follow-up Information     Follow up With Specialties Details Why Contact Info    Call your infectious disease doctor tomorrow AM  Today            Discharge Medication List as of 3/22/2020  4:03 AM      CONTINUE these medications which have NOT CHANGED    Details   linezolid (ZYVOX) 600 mg tablet Take 600 mg by mouth every 12 (twelve) hours, Historical Med      ALPRAZolam (XANAX) 0 5 mg tablet Take 0 5 mg by mouth 3 (three) times a day as needed for anxiety, Historical Med           No discharge procedures on file      PDMP Review     None          ED Provider  Electronically Signed by           Thurman Favre, MD  03/27/20 8674

## 2020-03-23 ENCOUNTER — OFFICE VISIT (OUTPATIENT)
Dept: INFECTIOUS DISEASES | Facility: CLINIC | Age: 30
End: 2020-03-23
Payer: COMMERCIAL

## 2020-03-23 VITALS
DIASTOLIC BLOOD PRESSURE: 64 MMHG | TEMPERATURE: 97 F | WEIGHT: 185 LBS | SYSTOLIC BLOOD PRESSURE: 132 MMHG | HEART RATE: 117 BPM | BODY MASS INDEX: 28.13 KG/M2

## 2020-03-23 DIAGNOSIS — F11.90 OPIOID USE DISORDER: ICD-10-CM

## 2020-03-23 DIAGNOSIS — I33.0 ACUTE BACTERIAL ENDOCARDITIS: ICD-10-CM

## 2020-03-23 DIAGNOSIS — B95.61 STAPHYLOCOCCUS AUREUS BACTEREMIA: Primary | ICD-10-CM

## 2020-03-23 DIAGNOSIS — R78.81 STAPHYLOCOCCUS AUREUS BACTEREMIA: Primary | ICD-10-CM

## 2020-03-23 DIAGNOSIS — I26.90: ICD-10-CM

## 2020-03-23 DIAGNOSIS — B19.20 HEPATITIS C: ICD-10-CM

## 2020-03-23 DIAGNOSIS — R74.01 TRANSAMINITIS: ICD-10-CM

## 2020-03-23 LAB
ATRIAL RATE: 110 BPM
P AXIS: 77 DEGREES
PR INTERVAL: 138 MS
QRS AXIS: 96 DEGREES
QRSD INTERVAL: 100 MS
QT INTERVAL: 314 MS
QTC INTERVAL: 424 MS
T WAVE AXIS: 23 DEGREES
VENTRICULAR RATE: 110 BPM

## 2020-03-23 PROCEDURE — 1111F DSCHRG MED/CURRENT MED MERGE: CPT | Performed by: INTERNAL MEDICINE

## 2020-03-23 PROCEDURE — 99215 OFFICE O/P EST HI 40 MIN: CPT | Performed by: INTERNAL MEDICINE

## 2020-03-23 PROCEDURE — 93010 ELECTROCARDIOGRAM REPORT: CPT | Performed by: INTERNAL MEDICINE

## 2020-03-23 RX ORDER — LINEZOLID 600 MG/1
600 TABLET, FILM COATED ORAL EVERY 12 HOURS SCHEDULED
Qty: 20 TABLET | Refills: 0 | Status: SHIPPED | OUTPATIENT
Start: 2020-03-23 | End: 2020-04-02

## 2020-03-23 NOTE — PROGRESS NOTES
Progress Note - Infectious Disease   Sola Malcolm 35 y o  male MRN: [id number]  Unit/Bed#:  Encounter: [id number]      Impression/Plan:    1- MSSA bacteremia:  Blood culture 03/01/2020 done at [facility] positive in 4 sets to MSSA  Bacteremia is likely complicated by septic pulmonary emboli, concern for infective endocarditis  Source of bacteremia is likely skin, secondary to IV drug use  Patient received 4 days of cefazolin IV while admitted at [facility], then one week of  daptomycin IV at infusion center through 03/17/2020, and now is on linezolid po  Patient was seen at [address] [place] [address] yesterday 03/22/2020 for possible allergic reaction  He was advised to stop his linezolid, though this occurred 3 days after initiation of linezolid  Patient attributes it to drug use and 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"  - continue linezolid 600 mg po q12h through 04/03/2020  Follow up in ID office 04/01/2020; will plan to  finish 4 weeks course for Staph bacteremia and right-sided endocarditis  If patient is not able to continue linezolid due to side effects or for other reasons, will consider Bactrim 10 milligram/kg per day through 04/03/2020  - repeat CBC weekly  Cbc and cmp  to be done 03/27/2020      2- Septic pulmonary emboli:  Based on CT chest finding done at [facility] 03/01/2020  These findings, with the presence of MSSA bacteremia raise concern for tricuspid valve infective endocarditis despite that MAURA was negative (MAURA was very limited due to technical difficulty)  - continue linezolid as mentioned above to [facility] 6 weeks course as mentioned above       3- IV drug use:  HIV screening done 03/03/2020 is negative  - patient is not candidate for home IV antibiotic therapy  - I discussed with patient that continues IV drug use puts him at risk for recurrent bacteremia, recurrent endocarditis, as well as the risk for drug overdose and death    Patient understands risk, still not interested in inpatient rehab, reports going to outpatient rehab at [facility]     4- chronic hepatitis-C:  As reported by the patient  - outpatient follow-up with GI for workup and treatment    5- transaminitis:  ALT and AST in the 300s based on labs 03/22/2020  Patient denies nausea, vomiting or abdominal pain  It is less likely that linezolid has caused elevation LFTs  - will closely trend LFT  Repeat LFT 03/27/2020 as mentioned above  - if onset of nausea, vomiting, abdominal pain, fever patient will stop linezolid and go to ER    6- tachycardia:  Patient reports chronic tachycardia related to anxiety and his continues drug use  Currently he is hemodynamically stable, he denies chest pain  He has multiple track marks that show active drug use  - patient informed to go to ER if chest pain, shortness of breath or worsening of his tachycardia    Antibiotics:  linezolid po started 03/20/2020      Subjective:  Patient has no fever, chills, sweats; no nausea, vomiting, diarrhea; no cough, shortness of breath; no pain  No new symptoms  ROS: A complete 12 point ROS is negative other than that noted in the HPI    Followup portions patient history reviewed and updated as: Allergies, current medications, past medical history, past social history, past surgical history, and the problem list    Objective:  Vitals:  Vitals:    03/23/20 1012   BP: 132/64   Pulse: (!) 117   Temp: (!) 97 °F (36 1 °C)   Weight: 83 9 kg (185 lb)       Physical Exam:   General Appearance:  Alert, interactive, appearing well, nontoxic, no acute distress  Throat: Oropharynx moist without lesions  Lungs:   Clear to auscultation bilaterally; no audible wheezes, rhonchi or rales; respirations unlabored   Heart:  RRR; no murmur, rub or gallop   Abdomen:   Soft, non-tender, non-distended, positive bowel sounds  Extremities: No clubbing, cyanosis or edema   Skin: No new rashes or lesions  No new draining wounds    Multiple track marks and signs of active drug use       Labs, Imaging, & Other studies:   All pertinent labs and imaging studies were personally reviewed    Lab Results   Component Value Date    K 3 8 03/22/2020     03/22/2020    CO2 29 03/22/2020    BUN 15 03/22/2020    CREATININE 1 21 03/22/2020    CALCIUM 9 0 03/22/2020     (H) 03/22/2020     (H) 03/22/2020    ALKPHOS 117 (H) 03/22/2020    EGFR 80 03/22/2020     Lab Results   Component Value Date    WBC 9 57 03/22/2020    HGB 17 6 (H) 03/22/2020    HCT 50 9 (H) 03/22/2020    MCV 86 03/22/2020     03/22/2020   No results found for: Sukumar Lopez

## 2020-03-23 NOTE — PATIENT INSTRUCTIONS
Please continue taking your zyvox as prescribed  Obtain labwork on Friday 3/27      Follow up April 1st

## 2020-03-30 ENCOUNTER — TELEPHONE (OUTPATIENT)
Dept: INFECTIOUS DISEASES | Facility: CLINIC | Age: 30
End: 2020-03-30

## 2020-03-30 NOTE — TELEPHONE ENCOUNTER
Spoke to pt and confirmed appt for Wednesday @ 11:00  Advised pt that he needs to have his labs completed before his appt  Pt says he will get these done before the appt

## 2020-04-05 ENCOUNTER — HOSPITAL ENCOUNTER (EMERGENCY)
Facility: HOSPITAL | Age: 30
Discharge: HOME | End: 2020-04-05
Attending: EMERGENCY MEDICINE | Admitting: EMERGENCY MEDICINE
Payer: COMMERCIAL

## 2020-04-05 VITALS
RESPIRATION RATE: 20 BRPM | TEMPERATURE: 97.7 F | OXYGEN SATURATION: 96 % | SYSTOLIC BLOOD PRESSURE: 156 MMHG | BODY MASS INDEX: 28.59 KG/M2 | HEART RATE: 106 BPM | WEIGHT: 188 LBS | DIASTOLIC BLOOD PRESSURE: 83 MMHG

## 2020-04-05 DIAGNOSIS — R51.9 HEADACHE: Primary | ICD-10-CM

## 2020-04-05 LAB
ALBUMIN SERPL BCP-MCNC: 3.5 G/DL (ref 3.5–5)
ALP SERPL-CCNC: 94 U/L (ref 46–116)
ALT SERPL W P-5'-P-CCNC: 201 U/L (ref 12–78)
ANION GAP SERPL CALCULATED.3IONS-SCNC: 4 MMOL/L (ref 4–13)
AST SERPL W P-5'-P-CCNC: 78 U/L (ref 5–45)
BASOPHILS # BLD AUTO: 0.04 THOUSANDS/ΜL (ref 0–0.1)
BASOPHILS NFR BLD AUTO: 1 % (ref 0–1)
BILIRUB SERPL-MCNC: 0.82 MG/DL (ref 0.2–1)
BUN SERPL-MCNC: 14 MG/DL (ref 5–25)
CALCIUM SERPL-MCNC: 8.9 MG/DL (ref 8.3–10.1)
CHLORIDE SERPL-SCNC: 105 MMOL/L (ref 100–108)
CO2 SERPL-SCNC: 30 MMOL/L (ref 21–32)
CREAT SERPL-MCNC: 0.87 MG/DL (ref 0.6–1.3)
EOSINOPHIL # BLD AUTO: 0.46 THOUSAND/ΜL (ref 0–0.61)
EOSINOPHIL NFR BLD AUTO: 5 % (ref 0–6)
ERYTHROCYTE [DISTWIDTH] IN BLOOD BY AUTOMATED COUNT: 11.9 % (ref 11.6–15.1)
GFR SERPL CREATININE-BSD FRML MDRD: 117 ML/MIN/1.73SQ M
GLUCOSE SERPL-MCNC: 103 MG/DL (ref 65–140)
HCT VFR BLD AUTO: 42.3 % (ref 36.5–49.3)
HGB BLD-MCNC: 15.1 G/DL (ref 12–17)
IMM GRANULOCYTES # BLD AUTO: 0.04 THOUSAND/UL (ref 0–0.2)
IMM GRANULOCYTES NFR BLD AUTO: 1 % (ref 0–2)
LYMPHOCYTES # BLD AUTO: 2.03 THOUSANDS/ΜL (ref 0.6–4.47)
LYMPHOCYTES NFR BLD AUTO: 23 % (ref 14–44)
MCH RBC QN AUTO: 30.1 PG (ref 26.8–34.3)
MCHC RBC AUTO-ENTMCNC: 35.7 G/DL (ref 31.4–37.4)
MCV RBC AUTO: 84 FL (ref 82–98)
MONOCYTES # BLD AUTO: 0.79 THOUSAND/ΜL (ref 0.17–1.22)
MONOCYTES NFR BLD AUTO: 9 % (ref 4–12)
NEUTROPHILS # BLD AUTO: 5.51 THOUSANDS/ΜL (ref 1.85–7.62)
NEUTS SEG NFR BLD AUTO: 61 % (ref 43–75)
NRBC BLD AUTO-RTO: 0 /100 WBCS
PLATELET # BLD AUTO: 205 THOUSANDS/UL (ref 149–390)
PMV BLD AUTO: 8.7 FL (ref 8.9–12.7)
POTASSIUM SERPL-SCNC: 4.1 MMOL/L (ref 3.5–5.3)
PROT SERPL-MCNC: 7.6 G/DL (ref 6.4–8.2)
RBC # BLD AUTO: 5.02 MILLION/UL (ref 3.88–5.62)
SODIUM SERPL-SCNC: 139 MMOL/L (ref 136–145)
WBC # BLD AUTO: 8.87 THOUSAND/UL (ref 4.31–10.16)

## 2020-04-05 PROCEDURE — 87040 BLOOD CULTURE FOR BACTERIA: CPT | Performed by: EMERGENCY MEDICINE

## 2020-04-05 PROCEDURE — 85025 COMPLETE CBC W/AUTO DIFF WBC: CPT | Performed by: EMERGENCY MEDICINE

## 2020-04-05 PROCEDURE — 80053 COMPREHEN METABOLIC PANEL: CPT | Performed by: EMERGENCY MEDICINE

## 2020-04-05 PROCEDURE — 99284 EMERGENCY DEPT VISIT MOD MDM: CPT | Performed by: EMERGENCY MEDICINE

## 2020-04-05 PROCEDURE — 99283 EMERGENCY DEPT VISIT LOW MDM: CPT

## 2020-04-05 PROCEDURE — 36415 COLL VENOUS BLD VENIPUNCTURE: CPT | Performed by: EMERGENCY MEDICINE

## 2020-04-11 LAB — BACTERIA BLD CULT: NORMAL

## 2020-04-15 ENCOUNTER — TELEPHONE (OUTPATIENT)
Dept: INFECTIOUS DISEASES | Facility: CLINIC | Age: 30
End: 2020-04-15

## 2020-04-15 ENCOUNTER — DOCUMENTATION (OUTPATIENT)
Dept: OTHER | Facility: HOSPITAL | Age: 30
End: 2020-04-15

## 2020-04-15 DIAGNOSIS — B95.61 STAPHYLOCOCCUS AUREUS BACTEREMIA: Primary | ICD-10-CM

## 2020-04-15 DIAGNOSIS — R78.81 STAPHYLOCOCCUS AUREUS BACTEREMIA: Primary | ICD-10-CM

## 2020-04-27 ENCOUNTER — TELEPHONE (OUTPATIENT)
Dept: OTHER | Facility: OTHER | Age: 30
End: 2020-04-27

## 2020-04-29 ENCOUNTER — TELEPHONE (OUTPATIENT)
Dept: INFECTIOUS DISEASES | Facility: CLINIC | Age: 30
End: 2020-04-29

## 2020-05-10 ENCOUNTER — NURSE TRIAGE (OUTPATIENT)
Dept: OTHER | Facility: OTHER | Age: 30
End: 2020-05-10

## 2021-02-25 PROCEDURE — 99285 EMERGENCY DEPT VISIT HI MDM: CPT

## 2021-02-26 ENCOUNTER — HOSPITAL ENCOUNTER (INPATIENT)
Facility: HOSPITAL | Age: 31
LOS: 1 days | Discharge: LEFT BEFORE DISCHARGE | DRG: 724 | End: 2021-02-26
Attending: EMERGENCY MEDICINE | Admitting: HOSPITALIST
Payer: COMMERCIAL

## 2021-02-26 ENCOUNTER — APPOINTMENT (EMERGENCY)
Dept: RADIOLOGY | Facility: HOSPITAL | Age: 31
DRG: 724 | End: 2021-02-26
Payer: COMMERCIAL

## 2021-02-26 VITALS
TEMPERATURE: 98.5 F | SYSTOLIC BLOOD PRESSURE: 134 MMHG | HEART RATE: 95 BPM | BODY MASS INDEX: 29.55 KG/M2 | WEIGHT: 195 LBS | RESPIRATION RATE: 18 BRPM | DIASTOLIC BLOOD PRESSURE: 69 MMHG | OXYGEN SATURATION: 96 % | HEIGHT: 68 IN

## 2021-02-26 DIAGNOSIS — R06.00 DYSPNEA: ICD-10-CM

## 2021-02-26 DIAGNOSIS — R78.81 STAPHYLOCOCCUS AUREUS BACTEREMIA: ICD-10-CM

## 2021-02-26 DIAGNOSIS — B95.61 STAPHYLOCOCCUS AUREUS BACTEREMIA: ICD-10-CM

## 2021-02-26 DIAGNOSIS — A41.9 SEPSIS (HCC): Primary | ICD-10-CM

## 2021-02-26 DIAGNOSIS — F11.90 OPIOID USE DISORDER: ICD-10-CM

## 2021-02-26 PROBLEM — N17.9 AKI (ACUTE KIDNEY INJURY) (HCC): Status: ACTIVE | Noted: 2021-02-26

## 2021-02-26 PROBLEM — M54.6 ACUTE RIGHT-SIDED THORACIC BACK PAIN: Status: ACTIVE | Noted: 2021-02-26

## 2021-02-26 LAB
ALBUMIN SERPL BCP-MCNC: 2.9 G/DL (ref 3.5–5)
ALP SERPL-CCNC: 59 U/L (ref 46–116)
ALT SERPL W P-5'-P-CCNC: 46 U/L (ref 12–78)
ANION GAP SERPL CALCULATED.3IONS-SCNC: 6 MMOL/L (ref 4–13)
APTT PPP: 29 SECONDS (ref 23–37)
AST SERPL W P-5'-P-CCNC: 24 U/L (ref 5–45)
ATRIAL RATE: 104 BPM
ATRIAL RATE: 106 BPM
ATRIAL RATE: 126 BPM
BACTERIA UR QL AUTO: ABNORMAL /HPF
BASOPHILS # BLD AUTO: 0.05 THOUSANDS/ΜL (ref 0–0.1)
BASOPHILS NFR BLD AUTO: 0 % (ref 0–1)
BILIRUB SERPL-MCNC: 0.35 MG/DL (ref 0.2–1)
BILIRUB UR QL STRIP: NEGATIVE
BUN SERPL-MCNC: 17 MG/DL (ref 5–25)
CALCIUM ALBUM COR SERPL-MCNC: 9.6 MG/DL (ref 8.3–10.1)
CALCIUM SERPL-MCNC: 8.7 MG/DL (ref 8.3–10.1)
CHLORIDE SERPL-SCNC: 102 MMOL/L (ref 100–108)
CLARITY UR: CLEAR
CO2 SERPL-SCNC: 27 MMOL/L (ref 21–32)
COLOR UR: ABNORMAL
CREAT SERPL-MCNC: 1.51 MG/DL (ref 0.6–1.3)
CRP SERPL QL: 183 MG/L
EOSINOPHIL # BLD AUTO: 0.08 THOUSAND/ΜL (ref 0–0.61)
EOSINOPHIL NFR BLD AUTO: 0 % (ref 0–6)
ERYTHROCYTE [DISTWIDTH] IN BLOOD BY AUTOMATED COUNT: 12 % (ref 11.6–15.1)
ERYTHROCYTE [SEDIMENTATION RATE] IN BLOOD: 62 MM/HOUR (ref 0–14)
GFR SERPL CREATININE-BSD FRML MDRD: 62 ML/MIN/1.73SQ M
GLUCOSE SERPL-MCNC: 125 MG/DL (ref 65–140)
GLUCOSE UR STRIP-MCNC: NEGATIVE MG/DL
HCT VFR BLD AUTO: 43.8 % (ref 36.5–49.3)
HGB BLD-MCNC: 15.2 G/DL (ref 12–17)
HGB UR QL STRIP.AUTO: NEGATIVE
HYALINE CASTS #/AREA URNS LPF: ABNORMAL /LPF
IMM GRANULOCYTES # BLD AUTO: 0.09 THOUSAND/UL (ref 0–0.2)
IMM GRANULOCYTES NFR BLD AUTO: 1 % (ref 0–2)
INR PPP: 1.1 (ref 0.84–1.19)
KETONES UR STRIP-MCNC: ABNORMAL MG/DL
LACTATE SERPL-SCNC: 1.9 MMOL/L (ref 0.5–2)
LEUKOCYTE ESTERASE UR QL STRIP: NEGATIVE
LYMPHOCYTES # BLD AUTO: 1.78 THOUSANDS/ΜL (ref 0.6–4.47)
LYMPHOCYTES NFR BLD AUTO: 9 % (ref 14–44)
MCH RBC QN AUTO: 30.3 PG (ref 26.8–34.3)
MCHC RBC AUTO-ENTMCNC: 34.7 G/DL (ref 31.4–37.4)
MCV RBC AUTO: 87 FL (ref 82–98)
MONOCYTES # BLD AUTO: 2.33 THOUSAND/ΜL (ref 0.17–1.22)
MONOCYTES NFR BLD AUTO: 12 % (ref 4–12)
NEUTROPHILS # BLD AUTO: 14.61 THOUSANDS/ΜL (ref 1.85–7.62)
NEUTS SEG NFR BLD AUTO: 78 % (ref 43–75)
NITRITE UR QL STRIP: NEGATIVE
NON-SQ EPI CELLS URNS QL MICRO: ABNORMAL /HPF
NRBC BLD AUTO-RTO: 0 /100 WBCS
P AXIS: 29 DEGREES
P AXIS: 31 DEGREES
P AXIS: 70 DEGREES
PH UR STRIP.AUTO: 6 [PH]
PLATELET # BLD AUTO: 295 THOUSANDS/UL (ref 149–390)
PMV BLD AUTO: 9.6 FL (ref 8.9–12.7)
POTASSIUM SERPL-SCNC: 3.9 MMOL/L (ref 3.5–5.3)
PR INTERVAL: 138 MS
PR INTERVAL: 146 MS
PR INTERVAL: 150 MS
PROCALCITONIN SERPL-MCNC: 0.75 NG/ML
PROCALCITONIN SERPL-MCNC: 0.95 NG/ML
PROT SERPL-MCNC: 7.1 G/DL (ref 6.4–8.2)
PROT UR STRIP-MCNC: ABNORMAL MG/DL
PROTHROMBIN TIME: 14.2 SECONDS (ref 11.6–14.5)
QRS AXIS: 134 DEGREES
QRS AXIS: 36 DEGREES
QRS AXIS: 37 DEGREES
QRSD INTERVAL: 112 MS
QRSD INTERVAL: 114 MS
QRSD INTERVAL: 94 MS
QT INTERVAL: 316 MS
QT INTERVAL: 346 MS
QT INTERVAL: 352 MS
QTC INTERVAL: 457 MS
QTC INTERVAL: 459 MS
QTC INTERVAL: 462 MS
RBC # BLD AUTO: 5.02 MILLION/UL (ref 3.88–5.62)
RBC #/AREA URNS AUTO: ABNORMAL /HPF
SODIUM SERPL-SCNC: 135 MMOL/L (ref 136–145)
SP GR UR STRIP.AUTO: 1.03 (ref 1–1.03)
T WAVE AXIS: 17 DEGREES
T WAVE AXIS: 56 DEGREES
T WAVE AXIS: 62 DEGREES
UROBILINOGEN UR QL STRIP.AUTO: 1 E.U./DL
VENTRICULAR RATE: 104 BPM
VENTRICULAR RATE: 106 BPM
VENTRICULAR RATE: 126 BPM
WBC # BLD AUTO: 18.94 THOUSAND/UL (ref 4.31–10.16)
WBC #/AREA URNS AUTO: ABNORMAL /HPF

## 2021-02-26 PROCEDURE — 93005 ELECTROCARDIOGRAM TRACING: CPT

## 2021-02-26 PROCEDURE — 86140 C-REACTIVE PROTEIN: CPT | Performed by: EMERGENCY MEDICINE

## 2021-02-26 PROCEDURE — 85730 THROMBOPLASTIN TIME PARTIAL: CPT | Performed by: EMERGENCY MEDICINE

## 2021-02-26 PROCEDURE — 99255 IP/OBS CONSLTJ NEW/EST HI 80: CPT | Performed by: INTERNAL MEDICINE

## 2021-02-26 PROCEDURE — 80053 COMPREHEN METABOLIC PANEL: CPT | Performed by: EMERGENCY MEDICINE

## 2021-02-26 PROCEDURE — 81001 URINALYSIS AUTO W/SCOPE: CPT | Performed by: STUDENT IN AN ORGANIZED HEALTH CARE EDUCATION/TRAINING PROGRAM

## 2021-02-26 PROCEDURE — 85610 PROTHROMBIN TIME: CPT | Performed by: EMERGENCY MEDICINE

## 2021-02-26 PROCEDURE — 96368 THER/DIAG CONCURRENT INF: CPT

## 2021-02-26 PROCEDURE — 83605 ASSAY OF LACTIC ACID: CPT | Performed by: EMERGENCY MEDICINE

## 2021-02-26 PROCEDURE — 85652 RBC SED RATE AUTOMATED: CPT | Performed by: STUDENT IN AN ORGANIZED HEALTH CARE EDUCATION/TRAINING PROGRAM

## 2021-02-26 PROCEDURE — 85025 COMPLETE CBC W/AUTO DIFF WBC: CPT | Performed by: EMERGENCY MEDICINE

## 2021-02-26 PROCEDURE — 96365 THER/PROPH/DIAG IV INF INIT: CPT

## 2021-02-26 PROCEDURE — 36415 COLL VENOUS BLD VENIPUNCTURE: CPT | Performed by: EMERGENCY MEDICINE

## 2021-02-26 PROCEDURE — 93010 ELECTROCARDIOGRAM REPORT: CPT | Performed by: INTERNAL MEDICINE

## 2021-02-26 PROCEDURE — NC001 PR NO CHARGE: Performed by: HOSPITALIST

## 2021-02-26 PROCEDURE — 96366 THER/PROPH/DIAG IV INF ADDON: CPT

## 2021-02-26 PROCEDURE — 71045 X-RAY EXAM CHEST 1 VIEW: CPT

## 2021-02-26 PROCEDURE — 99281 EMR DPT VST MAYX REQ PHY/QHP: CPT | Performed by: EMERGENCY MEDICINE

## 2021-02-26 PROCEDURE — 87040 BLOOD CULTURE FOR BACTERIA: CPT | Performed by: EMERGENCY MEDICINE

## 2021-02-26 PROCEDURE — 87186 SC STD MICRODIL/AGAR DIL: CPT | Performed by: EMERGENCY MEDICINE

## 2021-02-26 PROCEDURE — 99223 1ST HOSP IP/OBS HIGH 75: CPT | Performed by: HOSPITALIST

## 2021-02-26 PROCEDURE — 84145 PROCALCITONIN (PCT): CPT | Performed by: EMERGENCY MEDICINE

## 2021-02-26 RX ORDER — SODIUM CHLORIDE, SODIUM GLUCONATE, SODIUM ACETATE, POTASSIUM CHLORIDE, MAGNESIUM CHLORIDE, SODIUM PHOSPHATE, DIBASIC, AND POTASSIUM PHOSPHATE .53; .5; .37; .037; .03; .012; .00082 G/100ML; G/100ML; G/100ML; G/100ML; G/100ML; G/100ML; G/100ML
1000 INJECTION, SOLUTION INTRAVENOUS ONCE
Status: COMPLETED | OUTPATIENT
Start: 2021-02-26 | End: 2021-02-26

## 2021-02-26 RX ORDER — SODIUM CHLORIDE, SODIUM GLUCONATE, SODIUM ACETATE, POTASSIUM CHLORIDE, MAGNESIUM CHLORIDE, SODIUM PHOSPHATE, DIBASIC, AND POTASSIUM PHOSPHATE .53; .5; .37; .037; .03; .012; .00082 G/100ML; G/100ML; G/100ML; G/100ML; G/100ML; G/100ML; G/100ML
100 INJECTION, SOLUTION INTRAVENOUS CONTINUOUS
Status: DISCONTINUED | OUTPATIENT
Start: 2021-02-26 | End: 2021-02-27 | Stop reason: HOSPADM

## 2021-02-26 RX ORDER — VANCOMYCIN HYDROCHLORIDE 500 MG/100ML
500 INJECTION, SOLUTION INTRAVENOUS ONCE
Status: DISCONTINUED | OUTPATIENT
Start: 2021-02-26 | End: 2021-02-26

## 2021-02-26 RX ORDER — LANOLIN ALCOHOL/MO/W.PET/CERES
3 CREAM (GRAM) TOPICAL
Status: DISCONTINUED | OUTPATIENT
Start: 2021-02-26 | End: 2021-02-26

## 2021-02-26 RX ORDER — DIAZEPAM 5 MG/1
5 TABLET ORAL EVERY 6 HOURS PRN
Status: DISCONTINUED | OUTPATIENT
Start: 2021-02-26 | End: 2021-02-26

## 2021-02-26 RX ORDER — VANCOMYCIN HYDROCHLORIDE 500 MG/100ML
500 INJECTION, SOLUTION INTRAVENOUS ONCE
Status: COMPLETED | OUTPATIENT
Start: 2021-02-26 | End: 2021-02-26

## 2021-02-26 RX ORDER — LOPERAMIDE HYDROCHLORIDE 2 MG/1
2 CAPSULE ORAL EVERY 4 HOURS PRN
Status: DISCONTINUED | OUTPATIENT
Start: 2021-02-26 | End: 2021-02-27 | Stop reason: HOSPADM

## 2021-02-26 RX ORDER — ACETAMINOPHEN 325 MG/1
650 TABLET ORAL EVERY 6 HOURS PRN
Status: DISCONTINUED | OUTPATIENT
Start: 2021-02-26 | End: 2021-02-27 | Stop reason: HOSPADM

## 2021-02-26 RX ORDER — METHOCARBAMOL 500 MG/1
500 TABLET, FILM COATED ORAL EVERY 6 HOURS PRN
Status: DISCONTINUED | OUTPATIENT
Start: 2021-02-26 | End: 2021-02-27 | Stop reason: HOSPADM

## 2021-02-26 RX ORDER — LANOLIN ALCOHOL/MO/W.PET/CERES
3 CREAM (GRAM) TOPICAL ONCE
Status: DISCONTINUED | OUTPATIENT
Start: 2021-02-26 | End: 2021-02-27 | Stop reason: HOSPADM

## 2021-02-26 RX ORDER — CLONIDINE HYDROCHLORIDE 0.1 MG/1
0.1 TABLET ORAL EVERY 8 HOURS PRN
Status: DISCONTINUED | OUTPATIENT
Start: 2021-02-26 | End: 2021-02-27 | Stop reason: HOSPADM

## 2021-02-26 RX ADMIN — METHOCARBAMOL 500 MG: 500 TABLET ORAL at 09:59

## 2021-02-26 RX ADMIN — ACETAMINOPHEN 650 MG: 325 TABLET, FILM COATED ORAL at 08:05

## 2021-02-26 RX ADMIN — VANCOMYCIN HYDROCHLORIDE 1250 MG: 10 INJECTION, POWDER, LYOPHILIZED, FOR SOLUTION INTRAVENOUS at 05:41

## 2021-02-26 RX ADMIN — CLONIDINE HYDROCHLORIDE 0.1 MG: 0.1 TABLET ORAL at 10:09

## 2021-02-26 RX ADMIN — SODIUM CHLORIDE, SODIUM GLUCONATE, SODIUM ACETATE, POTASSIUM CHLORIDE, MAGNESIUM CHLORIDE, SODIUM PHOSPHATE, DIBASIC, AND POTASSIUM PHOSPHATE 100 ML/HR: .53; .5; .37; .037; .03; .012; .00082 INJECTION, SOLUTION INTRAVENOUS at 08:04

## 2021-02-26 RX ADMIN — SODIUM CHLORIDE, SODIUM GLUCONATE, SODIUM ACETATE, POTASSIUM CHLORIDE, MAGNESIUM CHLORIDE, SODIUM PHOSPHATE, DIBASIC, AND POTASSIUM PHOSPHATE 1000 ML: .53; .5; .37; .037; .03; .012; .00082 INJECTION, SOLUTION INTRAVENOUS at 02:07

## 2021-02-26 RX ADMIN — VANCOMYCIN HYDROCHLORIDE 500 MG: 500 INJECTION, SOLUTION INTRAVENOUS at 09:59

## 2021-02-26 RX ADMIN — CEFEPIME HYDROCHLORIDE 2000 MG: 2 INJECTION, POWDER, FOR SOLUTION INTRAVENOUS at 04:24

## 2021-02-26 RX ADMIN — SODIUM CHLORIDE, SODIUM GLUCONATE, SODIUM ACETATE, POTASSIUM CHLORIDE, MAGNESIUM CHLORIDE, SODIUM PHOSPHATE, DIBASIC, AND POTASSIUM PHOSPHATE 100 ML/HR: .53; .5; .37; .037; .03; .012; .00082 INJECTION, SOLUTION INTRAVENOUS at 16:53

## 2021-02-26 NOTE — PLAN OF CARE
Problem: Potential for Falls  Goal: Patient will remain free of falls  Description: INTERVENTIONS:  - Assess patient frequently for physical needs  -  Identify cognitive and physical deficits and behaviors that affect risk of falls    -  [place] fall precautions as indicated by assessment   - Educate patient/family on patient safety including physical limitations  - Instruct patient to call for assistance with activity based on assessment  - Modify environment to reduce risk of injury  - Consider OT/PT consult to assist with strengthening/mobility  Outcome: Progressing     Problem: PAIN - ADULT  Goal: Verbalizes/displays adequate comfort level or baseline comfort level  Description: Interventions:  - Encourage patient to monitor pain and request assistance  - Assess pain using appropriate pain scale  - Administer analgesics based on type and severity of pain and evaluate response  - Implement non-pharmacological measures as appropriate and evaluate response  - Consider cultural and social influences on pain and pain management  - Notify physician/advanced practitioner if interventions unsuccessful or patient reports new pain  Outcome: Progressing     Problem: INFECTION - ADULT  Goal: Absence or prevention of progression during hospitalization  Description: INTERVENTIONS:  - Assess and monitor for signs and symptoms of infection  - Monitor lab/diagnostic results  - Monitor all insertion sites, i e  indwelling lines, tubes, and drains  - Monitor endotracheal if appropriate and nasal secretions for changes in amount and color  - [place] appropriate cooling/warming therapies per order  - Administer medications as ordered  - Instruct and encourage patient and family to use good hand hygiene technique  - Identify and instruct in appropriate isolation precautions for identified infection/condition  Outcome: Progressing  Goal: Absence of fever/infection during neutropenic period  Description: INTERVENTIONS:  - Monitor WBC    Outcome: Progressing     Problem: SAFETY ADULT  Goal: Patient will remain free of falls  Description: INTERVENTIONS:  - Assess patient frequently for physical needs  -  Identify cognitive and physical deficits and behaviors that affect risk of falls    -  [place] fall precautions as indicated by assessment   - Educate patient/family on patient safety including physical limitations  - Instruct patient to call for assistance with activity based on assessment  - Modify environment to reduce risk of injury  - Consider OT/PT consult to assist with strengthening/mobility  Outcome: Progressing  Goal: Maintain or return to baseline ADL function  Description: INTERVENTIONS:  -  Assess patient's ability to carry out ADLs; assess patient's baseline for ADL function and identify physical deficits which impact ability to perform ADLs (bathing, care of mouth/teeth, toileting, grooming, dressing, etc )  - Assess/evaluate cause of self-care deficits   - Assess range of motion  - Assess patient's mobility; develop plan if impaired  - Assess patient's need for assistive devices and provide as appropriate  - Encourage maximum independence but intervene and supervise when necessary  - Involve family in performance of ADLs  - Assess for home care needs following discharge   - Consider OT consult to assist with ADL evaluation and planning for discharge  - Provide patient education as appropriate  Outcome: Progressing  Goal: Maintain or return mobility status to optimal level  Description: INTERVENTIONS:  - Assess patient's baseline mobility status (ambulation, transfers, stairs, etc )    - Identify cognitive and physical deficits and behaviors that affect mobility  - Identify mobility aids required to assist with transfers and/or ambulation (gait belt, sit-to-stand, lift, walker, cane, etc )  - [place] fall precautions as indicated by assessment  - Record patient progress and toleration of activity level on Mobility SBAR; progress patient to next Phase/Stage  - Instruct patient to call for assistance with activity based on assessment  - Consider rehabilitation consult to assist with strengthening/weightbearing, etc   Outcome: Progressing     Problem: DISCHARGE PLANNING  Goal: Discharge to home or other facility with appropriate resources  Description: INTERVENTIONS:  - Identify barriers to discharge w/patient and caregiver  - Arrange for needed discharge resources and transportation as appropriate  - Identify discharge learning needs (meds, wound care, etc )  - Arrange for interpretive services to assist at discharge as needed  - Refer to Case Management Department for coordinating discharge planning if the patient needs post-hospital services based on physician/advanced practitioner order or complex needs related to functional status, cognitive ability, or social support system  Outcome: Progressing     Problem: Knowledge Deficit  Goal: Patient/family/caregiver demonstrates understanding of disease process, treatment plan, medications, and discharge instructions  Description: Complete learning assessment and assess knowledge base    Interventions:  - Provide teaching at level of understanding  - Provide teaching via preferred learning methods  Outcome: Progressing

## 2021-02-26 NOTE — SEPSIS NOTE
Sepsis Note   Vane Brown 35 y o  male MRN: [id number]  Unit/Bed#: ED 25 Encounter: [id number]      qSOFA     [address] Name 02/26/21 0430 02/26/21 0215 02/26/21 0025          Altered mental status GCS < 15  --  --  0      Respiratory Rate > / =22  0  0  0      Systolic BP < / =278  0  --  0      Q Sofa Score  0  0  0          Initial Sepsis Screening     Row Name 02/26/21 0515                Is the patient's history suggestive of a new or worsening infection? (!) Yes (Proceed)  -TYREL        Suspected source of infection  endocarditis  -TYREL        Are two or more of the following signs & symptoms of infection both present and new to the patient? (!) Yes (Proceed)  -TYREL        Indicate SIRS criteria  Tachycardia > 90 bpm;Leukocytosis (WBC > [address] IJL)  -TYREL        If the answer is yes to both questions, suspicion of sepsis is present  --        If severe sepsis is present AND tissue hypoperfusion perists in the hour after fluid resuscitation or lactate > 4, the patient meets criteria for SEPTIC SHOCK  --        Are any of the following organ dysfunction criteria present within 6 hours of suspected infection and SIRS criteria that are NOT considered to be chronic conditions? No  -TYREL        Organ dysfunction  Creatinine > 0 5 mg/dl ABOVE BASELINE  -TYREL        Date of presentation of severe sepsis  --        Time of presentation of severe sepsis  --        Tissue hypoperfusion persists in the hour after crystalloid fluid administration, evidenced, by either:  --        Was hypotension present within one hour of the conclusion of crystalloid fluid administration?   No  -TYREL        Date of presentation of septic shock  --        Time of presentation of septic shock  --          User Key  (r) = Recorded By, (t) = Taken By, (c) = Cosigned By    [id number]  Name Provider Type    [id number] [facility],  Resident

## 2021-02-26 NOTE — ASSESSMENT & PLAN NOTE
Patient has a history of IV drug abuse and presented to [facility] 2 days ago with fever chest pain and dyspnea    Was receiving IV antibiotics for suspected endocarditis but decided to leave [place] due to being on happy with the treatment he was receiving and decided to come to [facility]     -continue IV vancomycin and cefepime  -CT chest and echocardiogram at [facility] did not show any signs of embolism or septic emboli  -chest x-ray ordered  -consider obtaining repeat echocardiogram  -follow-up blood cultures x2  -EKG ordered  -continue to monitor vitals  -daily CBC and BMP  -can consider consulting ID and CT surgery

## 2021-02-26 NOTE — ASSESSMENT & PLAN NOTE
Hx of MSSA bacteremia and possible septic pulmonary emboli through 04/03/2020 (tx with 4 days of cefazolin IV while admitted at [facility], then one week of  daptomycin IV at infusion center through 03/17/2020, and Patient was transitioned to linezolid p o  For the last 2 weeks of his antibiotic course)  Patient has a history of IV drug abuse and presented to [facility] 2 days ago with fever chest pain and dyspnea  Was receiving IV antibiotics for suspected endocarditis but decided to leave [place] as he was unhappy with care and decided to come to [facility]  Per chart review, 1/2 blood cultures positive for Staph a susceptible to vancomycin  CTA C/A/P w/o acute abnormalities and echocardiogram at [facility] did not show any signs of embolism or septic emboli  EKG sinus tachycardia with no acute ST/T-wave changes  + leukocytosis at 18,000  , procalcitonin 0 75  · Continue with vancomycin  · Will likely needed MAURA  · Concern for osteomyelitis given midthoracic back pain, consider MRI vs CT of thoracic spine  · Follow-up on repeat blood cultures  · Infectious disease consulted, appreciate recommendations  · Monitor fever curve white blood cell count

## 2021-02-26 NOTE — ED NOTES
Pt requesting pain medications for back pain, requesting valium  SOD tigertexted to advise        Alejandra Garcia RN  02/26/21 8935

## 2021-02-26 NOTE — ASSESSMENT & PLAN NOTE
Per chart review, chronic hepatitis C  Did see Dr Carlie Mendoza outpatient in November of 2020, and was started on [address] on 12/14/2020, but patient reports stopping treatment [facility] as he did not receive the second pack of medications in mail yet     · Will need close outpatient follow-up with GI for treatment

## 2021-02-26 NOTE — PROGRESS NOTES
Vancomycin Assessment    Edwin Ospina is a 35 y o  male who is currently receiving vancomycin 1250 mg and 500 mg IV once for bacteremia (1750 mg total loading dose)  Relevant clinical data and objective history reviewed:  Creatinine   Date Value Ref Range Status   02/26/2021 1 51 (H) 0 60 - 1 30 mg/dL Final     Comment:     Standardized to IDMS reference method   04/05/2020 0 87 0 60 - 1 30 mg/dL Final     Comment:     Standardized to IDMS reference method   03/22/2020 1 21 0 60 - 1 30 mg/dL Final     Comment:     Standardized to IDMS reference method     /69   Pulse 95   Temp 98 5 °F (36 9 °C) (Oral)   Resp 18   Ht 5' 8" (1 727 m)   Wt 88 5 kg (195 lb)   SpO2 96%   BMI 29 65 kg/m²   I/O last 3 completed shifts: In: 48 [IV Piggyback:50]  Out: -   Lab Results   Component Value Date/Time    BUN 17 02/26/2021 01:15 AM    WBC 18 94 (H) 02/26/2021 01:15 AM    HGB 15 2 02/26/2021 01:15 AM    HCT 43 8 02/26/2021 01:15 AM    MCV 87 02/26/2021 01:15 AM     02/26/2021 01:15 AM     Temp Readings from Last 3 Encounters:   02/26/21 98 5 °F (36 9 °C) (Oral)   04/05/20 97 7 °F (36 5 °C) (Oral)   03/23/20 (!) 97 °F (36 1 °C)     Vancomycin Days of Therapy: 1    Assessment/Plan  The patient is currently on vancomycin utilizing pulse dosing based on actual body weight  The patient is currently receiving a 1750 mg IV loading dose and will be changed to 1250 mg prn random level < 20  Pharmacy will also follow closely for signs and symptoms of nephrotoxicity, infusion reactions, and appropriateness of therapy  BMP and CBC will be ordered per protocol  Plan for random level tonight (2/26) at approximately 2200  Due to infection severity, will target a trough of 15-20 (appropriate for most indications)  Pharmacy will continue to follow the patients culture results and clinical progress daily      Kelsey Eng, Pharmacist

## 2021-02-26 NOTE — ED NOTES
Patient having diarrhea but refusing immodium   Stating he may sign out AMA and he will let me know in 5 minutes     Thaddeus Gaytan, RN  02/26/21 4335

## 2021-02-26 NOTE — UTILIZATION REVIEW
UPDATED INSURANCE  Notification of Inpatient Admission/Inpatient Authorization Request   This is a Notification of Inpatient Admission for 5 Zehra Cai  Be advised that this patient was admitted to our facility under Inpatient Status  Contact Sabine Contreras at [phone] for additional admission information  [facility] UR DEPT  DEDICATED [phone]  Patient Name:   Antwan Lenz   YOB: 1990       [address]   [address]:   [facility]  Tax ID: [id number]  NPI: [id number] Attending Provider/NPI: Palak Cheung Md [[id number]]   Attending Physician:  JENNIFER Rodriguez  Specialty- Internal Medicine,   [facility] [address] [facility] [id number]  [address]  Phone 1: [phone]  Fax: [phone]   Place of Service Code: 24     Place of Service Name:  [address] [place] [address]   Start Date: 3/8/20 0423     Discharge Date & Time: 3/9/2020 10:45 AM    Type of Admission: Inpatient Status Discharge Disposition (if discharged): Home/Self Care   Patient Diagnoses: Infection [B99 9]  Tachycardia [R00 0]  Septic pulmonary embolism ([facility] [address] ) [I26 90]  MSSA bacteremia [R78 81]     Orders: Admission Orders (From admission, onward)     Ordered        03/08/20 0423  Inpatient Admission  Once                    Assigned Utilization Review Contact: Sabine Contreras  Utilization   Network Utilization Review Department  Phone: [phone]; Fax [phone]  Email: [email or web address]   ATTENTION PAYERS: Please call the assigned Utilization  directly with any questions or concerns ALL voicemails in the department are confidential  Send all requests for admission clinical reviews, approved or denied determinations and any other requests to dedicated fax number belonging to the campus where the patient is receiving treatment    Initial Clinical Review    Admission: Date/Time/Statement:   Admission Orders (From admission, onward)     Ordered        03/08/20 0423  Inpatient Admission  Once                   Orders Placed This Encounter   Procedures    Inpatient Admission     Standing Status:   Standing     Number of Occurrences:   1     Order Specific Question:   Admitting Physician     Answer:   Tameka Ko [[id number]]     Order Specific Question:   Level of Care     Answer:   Med Surg [16]     Order Specific Question:   Estimated length of stay     Answer:   More than 2 Midnights     Order Specific Question:   Certification     Answer:   I certify that inpatient services are medically necessary for this patient for a duration of greater than two midnights  See H&P and MD Progress Notes for additional information about the patient's course of treatment  ED Arrival Information     Expected Arrival Acuity Means of Arrival Escorted By Service Admission Type    - 3/8/2020 03:01 Urgent Walk-In Self General Medicine Urgent    Arrival Complaint    infection        Chief Complaint   Patient presents with    Medical Problem     pt states he has a "blood infection"  pt was suppose to be admitted but signed out [place]  pt returning for admission     Assessment/Plan: 35 yr old male to ed to complete his course of iv abx  Blood cultures from [facility] grew mssa and recently admitted at [facility] for mssa bacteremia and left on 3/7 for social reasons and came back an hour later      Blood cultures on 3/4/2020 grew no growth at 48 hours  There is concern for endocarditis  He is admitted as an inpatient with staph aureus bacteremia   and will start back on iv cefazolin , consult id, trend fever and wbc  Continues to be concern for septic embolio  Admits heroin and cocaine use       3/9 patient left ama before id consult n the plan is [facility] with daily daptomycin through daily peripheal iv's  If this fails consider switiching patient to po linezolid or iv dalbavancin    ED Triage Vitals [03/08/20 0331]   Temperature Pulse Respirations Blood Pressure SpO2   98 2 °F (36 8 °C) (!) 130 20 147/81 96 %      Temp Source Heart Rate Source Patient Position - Orthostatic VS BP Location FiO2 (%)   Oral Monitor Lying Right arm --      Pain Score       No Pain          Wt Readings from Last 1 Encounters:   02/26/21 88 5 kg (195 lb)     Additional Vital Signs:   03/09/20 0701  98 3 °F (36 8 °C)  100  18  131/63  --  99 %  None (Room air)  Lying   03/09/20 0247  98 3 °F (36 8 °C)  91  18  158/93   --  97 %  None (Room air)  Lying   BP: Map 119 at 03/09/20 0247   03/08/20 2317  98 4 °F (36 9 °C)  114Abnormal   18  176/98Abnormal    --  98 %  None (Room air)  Sitting   BP: Map 130 at 03/08/20 2317   03/08/20 2003  98 5 °F (36 9 °C)  104  18  162/100  122  96 %  None (Room air)  Lying   03/08/20 1616  98 8 °F (37 1 °C)  84  18  179/96Abnormal    --  96 %  None (Room air)  Lying   BP: Map 129 at 03/08/20 1616   03/08/20 0900  --  --  --  --  --  --  None (Room air)  --   03/08/20 0450  98 1 °F (36 7 °C)  118Abnormal   18  144/65  --  96 %  None (Room air)  Lying   03/08/20 0331  98 2 °F (36 8 °C)  130Abnormal   20  147/81  --  96 %  None (Room air)  Lying       Pertinent Labs/Diagnostic Test Results:   Results from last 7 days   Lab Units 02/26/21  0115   WBC Thousand/uL 18 94*   HEMOGLOBIN g/dL 15 2   HEMATOCRIT % 43 8   PLATELETS Thousands/uL 295   NEUTROS ABS Thousands/µL 14 61*         Results from last 7 days   Lab Units 02/26/21  0115   SODIUM mmol/L 135*   POTASSIUM mmol/L 3 9   CHLORIDE mmol/L 102   CO2 mmol/L 27   ANION GAP mmol/L 6   BUN mg/dL 17   CREATININE mg/dL 1 51*   EGFR ml/min/1 73sq m 62   CALCIUM mg/dL 8 7     Results from last 7 days   Lab Units 02/26/21  0115   AST U/L 24   ALT U/L 46   ALK PHOS U/L 59   TOTAL PROTEIN g/dL 7 1   ALBUMIN g/dL 2 9*   TOTAL BILIRUBIN mg/dL 0 35         Results from last 7 days   Lab Units 02/26/21  0115   GLUCOSE RANDOM mg/dL 125           Results from last 7 days   Lab Units 02/26/21  0115   PROTIME seconds 14 2   INR  1 10   PTT seconds 29         Results from last 7 days   Lab Units 02/26/21  0423 02/26/21  0115   PROCALCITONIN ng/ml 0 75* 0 95*     Results from last 7 days   Lab Units 02/26/21  0115   LACTIC ACID mmol/L 1 9                       Results from last 7 days   Lab Units 02/26/21  0116   BLOOD CULTURE  Received in Microbiology Lab  Culture in Progress  Received in Microbiology Lab  Culture in Progress  ED Treatment:   Medication Administration from 03/08/2020 0301 to 03/08/2020 0444       Date/Time Order Dose Route Action Comments     03/08/2020 0414 sodium chloride 0 9 % bolus 1,000 mL 1,000 mL Intravenous New Bag      03/08/2020 0430 LORazepam (ATIVAN) injection 0 5 mg 0 5 mg Intravenous Given         Past Medical History:   Diagnosis Date    Drug use     Seizures ([facility] [address] )      Present on Admission:   Opioid use disorder ([facility] [address] )   Acute septic pulmonary embolism (HCC)   Staphylococcus aureus bacteremia   Hepatitis C   Transaminitis   Nicotine dependence      Admitting Diagnosis: Infection [B99 9]  Tachycardia [R00 0]  Septic pulmonary embolism (HCC) [I26 90]  MSSA bacteremia [R78 81]  Age/Sex: 35 y o  male  Admission Orders:  Scheduled Medications:  No current facility-administered medications for this encounter  Continuous IV Infusions:  No current facility-administered medications for this encounter  PRN Meds:  No current facility-administered medications for this encounter  IP CONSULT TO CASE MANAGEMENT    Network Utilization Review Department  [email or web address]  ATTENTION: Please call with any questions or concerns to [phone] and carefully listen to the prompts so that you are directed to the right person   All voicemails are confidential   Hammad Hallmark all requests for admission clinical reviews, approved or denied determinations and any other requests to dedicated fax number below belonging to the campus where the patient is receiving treatment  List of dedicated fax numbers for the Facilities:  [address] DENIALS (Administrative/Medical Necessity) [phone]   [address]  (Maternity/NICU/Pediatrics) [phone]   Anju Cedeño [phone]   Augusto Yu [phone]   Clarisa Carpenter [phone]   Mertha Finger [phone]   [address] [facility] 1525 [facility] [phone]   [facility]  [phone]   [address] [place] [address]  [phone]   [address] [place] [address] [address] [facility] [phone]       Notification of Discharge  This is a Notification of Discharge from our facility [address]  Please be advised that this patient has been discharge from our facility  Below you will find the admission and discharge date and time including the patients disposition  PRESENTATION DATE: 3/8/2020  3:26 AM  OBS ADMISSION DATE:   IP ADMISSION DATE: 3/8/20 0423   DISCHARGE DATE: 3/9/2020 10:45 AM  DISPOSITION: Home/Self Care Home/Self Care   Admission Orders listed below:  Admission Orders (From admission, onward)     Ordered        03/08/20 0423  Inpatient Admission  Once                   Please contact the UR Department if additional information is required to close this patient's authorization/case  [address] [place] Utilization Review Department  Main: [phone] x carefully listen to the prompts  All voicemails are confidential   [email or web address]  Send all requests for admission clinical reviews, approved or denied determinations and any other requests to dedicated fax number below belonging to the campus where the patient is receiving treatment   List of dedicated fax numbers:  FACILITY NAME UR FAX NUMBER   ADMISSION DENIALS (Administrative/Medical Necessity) [address] [facility] (Maternity/NICU/Pediatrics) [phone]   Leda Amor [phone]   Rosetta Palacio [phone]   Claritza James [phone]   [address] [phone]   [facility]  [phone]   [facility]  [address] [phone]

## 2021-02-26 NOTE — CONSULTS
Consultation - Infectious Disease   Angeli Hernández 35 y o  male MRN: [id number]  Unit/Bed#: ED 25 Encounter: [id number]      IMPRESSION & RECOMMENDATIONS:   MSSA bacteremia  - [facility] with blood cultures positive for Staph aureus which is susceptible to vancomycin  -etiology secondary to drug use; there is definite concern for osteomyelitis versus endocarditis  -2/24 CT chest abdomen and pelvis with and without contrast- lungs noted to be clear and no suspicious osseous abnormalities  Unremarkable  -WBC 18 94  -  -ESR 62    Plan-  -follow-up blood cultures  -patient currently on vancomycin and would recommend decreasing to cefazolin given MSSA  -if MAURA negative would need 2 weeks IV antibiotics but if positive would likely need 6 weeks  -continue to monitor for signs of worsening infection    Polysubstance abuse  -2/24 UDS positive for cocaine and amphetamines  -patient states last use was on Wednesday last week    -host referral on DC    Hepatitis C  - outpatient follow up with GI     TRAVIS  -management per primary team    Have discussed the above management plan in detail with the primary service    Extensive review of the medical records in epic including review of the notes, radiographs, and laboratory results     HISTORY OF PRESENT ILLNESS:  Reason for Consult: Gram + bacteremia   HPI: Angeli Hernández is a 35y o  year old male with past medical history significant for IV drug abuse and staph aureus bacteremia/endocarditis in March 2020 and history of hepatitis-C  Of note patient was recently admitted to [facility] a couple days prior to coming to [facility]   Patient was being worked up for Gram-positive bacteremia at that time over there and did have blood culture come back positive for Staph aureus which is susceptible to vancomycin  Talking with patient he states that he had relapsed on heroin last Wednesday after being clean for 1 year  Patient states that he was having worsening upper back pain which prompted him to go to the emergency department for further workup  He states that he did have a fall couple days after his heroin use which did acutely worsen his back pain  Patient denies any recent loss of bowel or bladder and denies any weakness, numbness or tingling in his lower extremities  Patient does state that 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  He also states he has stop smoking and only smokes on social occasions  Patient also states that he has maybe 1 drink per week  In his abdomen he has not used drugs more than the 1 time last Wednesday  REVIEW OF SYSTEMS:  A complete review of systems is negative other than that noted in the HPI  PAST MEDICAL HISTORY:  Past Medical History:   Diagnosis Date    Drug use     Seizures ([address] )      Past Surgical History:   Procedure Laterality Date    APPENDECTOMY         FAMILY HISTORY:  Non-contributory    SOCIAL HISTORY:  Social History   Social History     Substance and Sexual Activity   Alcohol Use Yes    Drinks per session: 3 or 4     Social History     Substance and Sexual Activity   Drug Use Yes    Types: Prescription, Heroin, Cocaine     Social History     Tobacco Use   Smoking Status Current Every Day Smoker    Packs/day: 0 50    Types: Cigarettes   Smokeless Tobacco Never Used       ALLERGIES:  No Known Allergies    MEDICATIONS:  All current active medications have been reviewed      PHYSICAL EXAM:  Temp:  [98 5 °F (36 9 °C)] 98 5 °F (36 9 °C)  HR:  [] 95  Resp:  [18] 18  BP: (114-134)/(53-69) 134/69  SpO2:  [96 %-98 %] 96 %  Temp (24hrs), Av 5 °F (36 9 °C), Min:98 5 °F (36 9 °C), Max:98 5 °F (36 9 °C)  Current: Temperature: 98 5 °F (36 9 °C)    Intake/Output Summary (Last 24 hours) at 2021 1006  Last data filed at 2021 0802  Gross per 24 hour   Intake 1300 ml   Output --   Net 1300 ml       General Appearance: Appearing well, nontoxic, and in no distress   Head:  Normocephalic, without obvious abnormality, atraumatic   Eyes:  Conjunctiva pink and sclera anicteric, both eyes   Nose: Nares normal, mucosa normal, no drainage   Throat: Oropharynx moist without lesions   Neck: Supple, symmetrical, no adenopathy, no tenderness/mass/nodules   Back:   Symmetric, no curvature, ROM normal, no CVA tenderness   Lungs:   Clear to auscultation bilaterally, respirations unlabored   Chest Wall:  No tenderness or deformity   Heart:  RRR; no murmur, rub or gallop   Abdomen:   Soft, non-tender, non-distended, positive bowel sounds    Extremities: No cyanosis, clubbing or edema   Skin: No rashes or lesions  No draining wounds noted  Lymph nodes: Cervical, supraclavicular nodes normal   Neurologic: Alert and oriented times 3, extremity strength 5/5 and symmetric       LABS, IMAGING, & OTHER STUDIES:  Lab Results:  I have personally reviewed pertinent labs  Results from last 7 days   Lab Units 02/26/21  0115   WBC Thousand/uL 18 94*   HEMOGLOBIN g/dL 15 2   PLATELETS Thousands/uL 295     Results from last 7 days   Lab Units 02/26/21  0115   SODIUM mmol/L 135*   POTASSIUM mmol/L 3 9   CHLORIDE mmol/L 102   CO2 mmol/L 27   BUN mg/dL 17   CREATININE mg/dL 1 51*   EGFR ml/min/1 73sq m 62   CALCIUM mg/dL 8 7   AST U/L 24   ALT U/L 46   ALK PHOS U/L 59     Results from last 7 days   Lab Units 02/26/21  0116   BLOOD CULTURE  Received in Microbiology Lab  Culture in Progress  Received in Microbiology Lab  Culture in Progress  Results from last 7 days   Lab Units 02/26/21  0423 02/26/21  0115   PROCALCITONIN ng/ml 0 75* 0 95*     Results from last 7 days   Lab Units 02/26/21  0115   CRP mg/L 183 0*               Imaging Studies:   I have personally reviewed pertinent imaging study reports and images in PACS  Other Studies:   I have personally reviewed pertinent reports

## 2021-02-26 NOTE — DISCHARGE SUMMARY
[facility] Discharge Summary - Medical Merrill Ramirez 35 y o  male MRN: [id number]    14[address] [facility] 7 Room / Bed: [address] [place] [address] [address] 7[address]-01 Encounter: [id number]    BRIEF OVERVIEW    Admitting Provider: Kody Ghosh MD  Discharge Provider: Kody Ghosh MD  Primary Care Physician at Discharge: N/A    Discharge To: Self care  Facility / Family Member Name: self care  Phone Number: See chart     Admission Date: 2/26/2021     Discharge Date: No discharge date for patient encounter  Primary Discharge Diagnosis  Principal Problem:    Staphylococcus aureus bacteremia  Active Problems:    Opioid use disorder ([facility] [address] )    Hepatitis C    TRAVIS (acute kidney injury) ([facility] [address] )    Acute right-sided thoracic back pain  Resolved Problems:    * No resolved hospital problems  *      Other Problems Addressed: NA    Consulting Providers   ID         Therapeutic Operative Procedures Performed  NA    Diagnostic Procedures Performed  NA    Discharge Disposition: Home/Self Care  Discharged With Lines: no    Test Results Pending at Discharge: Blood cultures  Outpatient Follow-Up  Patient leaving AMA  Follow up with consulting providers  Patient leaving AMA   Active Issues Requiring Follow-up   S Aureus bacteremia    Code Status: Level 1 - Full Code  Advance Directive and Living Will: <no information>  Power of :    POLST:      Medications   See after visit summary for reconciled discharge medications provided to patient and family  Allergies  No Known Allergies  Discharge Diet: regular diet  Activity restrictions: none    [address] [facility] Course    Per Dr Madhuri Bullock H+P  "Patient is a 78-year-old male with a past medical history of IV drug abuse and staph aureus bacteremia/endocarditis in 3/2020 and Hep C who presented to the emergency department today with suspicion for endocarditis    He had originally presented to [facility] yesterday with 2 days of fever, chest pain and dyspnea and was admitted and being worked up for endocarditis  He was started on antibiotics at [facility], however he was not happy with the treatment he was receiving and decided to leave AM and come to [address] [place] [address]  CT chest and echocardiogram at [facility] did not show any signs of embolism or septic emboli  Blood cultures are still pending  He has a history of IV drug abuse and last used IV drugs around 2 or 3 days ago  He currently reports Suboxone at home  Today he is complaining of chest and upper back pain  States the chest pain is from radiation of back pain anteriorly  Sharp, non exertional  Today he is not complaining of any fevers, chills, shortness of breath, abdominal pain, nausea, vomiting, diarrhea or neurological deficits      In the emergency department the patient's vital signs were stable  His laboratory study showed a creatinine of 1 51 and a white blood cell count of 18 94  Procalcitonin was mildly elevated at 0 95 and 0 75  CRP was elevated at 183  Lactic acid was normal   Blood cultures were drawn and the patient was started on IV vancomycin and cefepime  Patient was admitted for further management workup of staph a bacteremia  Presenting Problem/History of Present Illness"    On the evening of admission, patient decided he wanted to leave against medical advice  Patient states he has several issues going on with his life which necessitate his departure from the hospital   He states that he wants to come back at a later time, once he gets these issues straightened out  The patient and I had a long and thorough discussion on the risks of foregoing additional treatment which include endocarditis, diskitis, abscess, sepsis, death  Patient thoroughly understands these risks and wishes to leave against advice  Patient told me on multiple occasions that infectious disease attending stated to him that I will be okay for him to leave the hospital on p o  Antibiotics    I have reviewed infectious disease attending's note which directly contradicts this statement  Will discharge against medical advice at this time  Principal Problem:    Staphylococcus aureus bacteremia  Active Problems:    Opioid use disorder ([facility] [address] )    Hepatitis C    TRAVIS (acute kidney injury) ([facility] )    Acute right-sided thoracic back pain  Resolved Problems:    * No resolved hospital problems  *        Other Pertinent Test Results  NA     Discharge Condition: poor      Discharge  Statement   I spent 20 minutes minutes discharging the patient  This time was spent on the day of discharge  I had direct contact with the patient on the day of discharge  Additional documentation is required if more than 30 minutes were spent on discharge

## 2021-02-26 NOTE — H&P
INTERNAL MEDICINE RESIDENCY ADMISSION H&P     Name: Kamran Velez   Age & Sex: 35 y o  male   MRN: [id number]  Unit/Bed#: ED 25   Encounter: [id number]  Primary Care Provider: No primary care provider on file  Code Status: Level 1 - Full Code  Admission Status: INPATIENT   Disposition: Patient requires Med/Surg    Admit to team: SOD Team A    ASSESSMENT/PLAN     Principal Problem:    Staphylococcus aureus bacteremia  Active Problems:    Opioid use disorder ([facility] )    Hepatitis C    TRAVIS (acute kidney injury) ([place] [address] )    Acute right-sided thoracic back pain      Acute right-sided thoracic back pain  Assessment & Plan  Noted to have four-day history of worsening right-sided thoracic back pain, sharp in nature  Appears to be paraspinal area T10-11  Noted improvement with Robaxin and Valium received at [facility]  · , ESR pending  · In setting of staph A bacteremia concern for possible osteomyelitis  CTA chest abdomen pelvis did not reveal any abscess/lytic lesion  Consider MRI for further workup  · P r n  Pain management with Tylenol, Robaxin    TRAVIS (acute kidney injury) ([facility] )  Assessment & Plan  creatinine on presentation was 1 51,baseline appears to be around 1  Etiology unknown possibly in setting of receiving multiple doses of Toradol versus poor p o  Intake  patient received 1 L isolate in the emergency department  · continue IV fluids  · Follow-up on UA and urine eosinophils  · follow-up BMP and trend creatinine    Hepatitis C  Assessment & Plan  Per chart review, chronic hepatitis C  Did see Dr Sarah Hylton outpatient in November of 2020, and was started on [address] on 12/14/2020, but patient reports stopping treatment half-way  · Will need close outpatient follow-up with GI for treatment     Opioid use disorder [facility])  Assessment & Plan  History of IVDU in past including methamphetamine, heroin, and cocaine  Has not used in about a year, until he relapsed 10 days ago  Used 1 time    Reports being on Suboxone, per PDMP reviewed has not been filled since July 2020  Patient did receive 1 dose of Suboxone 8/2 mg b i d, well as IV Valium, fentanyl, and Toradol at [facility]  · UDS 2/24 + cocaine and methamphetamine  · Will contact [facility] patient reports receiving medications from an confirm dosing and recent refills  · Consider toxicology consult for further management  · For now continue with p r n  Symptom control with Tylenol, clonidine, Robaxin, and loperamide p r n     * Staphylococcus aureus bacteremia  Assessment & Plan  Hx of MSSA bacteremia and possible septic pulmonary emboli through 04/03/2020 (tx with 4 days of cefazolin IV while admitted at [facility], then one week of  daptomycin IV at infusion center through 03/17/2020, and Patient was transitioned to linezolid p o  For the last 2 weeks of his antibiotic course)  Patient has a history of IV drug abuse and presented to [facility] 2 days ago with fever chest pain and dyspnea  Was receiving IV antibiotics for suspected endocarditis but decided to leave [place] as he was unhappy with care and decided to come to [facility]  Per chart review, 1/2 blood cultures positive for Staph a susceptible to vancomycin  CTA C/A/P w/o acute abnormalities and echocardiogram at [facility] did not show any signs of embolism or septic emboli  EKG sinus tachycardia with no acute ST/T-wave changes  + leukocytosis at 18,000  , procalcitonin 0 75  · Continue with vancomycin  · Will likely needed MAURA  · Concern for osteomyelitis given midthoracic back pain, consider MRI vs CT of thoracic spine  · Follow-up on repeat blood cultures  · Infectious disease consulted, appreciate recommendations  · Monitor fever curve white blood cell count        VTE Pharmacologic Prophylaxis: Heparin  VTE Mechanical Prophylaxis: sequential compression device    CHIEF COMPLAINT     Chief Complaint   Patient presents with    Blood Infection     pt was recently seen at [facility] an diagnosed with endocarditis; pt left today and was unhapy with the care; was told he needed ~5 weeks IV abx and possibility of a port? HISTORY OF PRESENT ILLNESS     Patient is a 81-year-old male with a past medical history of IV drug abuse and staph aureus bacteremia/endocarditis in 3/2020 and Hep C who presented to the emergency department today with suspicion for endocarditis  He had originally presented to [facility] yesterday with 2 days of fever, chest pain and dyspnea and was admitted and being worked up for endocarditis  He was started on antibiotics at [facility], however he was not happy with the treatment he was receiving and decided to leave [place] and come to [facility]  CT chest and echocardiogram at [facility] did not show any signs of embolism or septic emboli  Blood cultures are still pending  He has a history of IV drug abuse and last used IV drugs around 2 or 3 days ago  He currently reports Suboxone at home  Today he is complaining of chest and upper back pain  States the chest pain is from radiation of back pain anteriorly  Sharp, non exertional  Today he is not complaining of any fevers, chills, shortness of breath, abdominal pain, nausea, vomiting, diarrhea or neurological deficits  In the emergency department the patient's vital signs were stable  His laboratory study showed a creatinine of 1 51 and a white blood cell count of 18 94  Procalcitonin was mildly elevated at 0 95 and 0 75  CRP was elevated at 183  Lactic acid was normal   Blood cultures were drawn and the patient was started on IV vancomycin and cefepime  Patient was admitted for further management workup of staph a bacteremia  REVIEW OF SYSTEMS     Review of Systems   Constitutional: Positive for fatigue  Negative for activity change, appetite change and fever  HENT: Negative for congestion, postnasal drip, rhinorrhea and sinus pain  Eyes: Negative for photophobia, pain and discharge  Respiratory: Negative for apnea, cough, chest tightness, shortness of breath and wheezing  Cardiovascular: Positive for chest pain  Negative for leg swelling  Gastrointestinal: Negative for abdominal distention, abdominal pain, constipation, diarrhea, nausea and vomiting  Endocrine: Negative for polyuria  Genitourinary: Negative for decreased urine volume, difficulty urinating, dysuria, hematuria and urgency  Musculoskeletal: Positive for back pain  Negative for myalgias and neck pain  Skin: Negative for pallor, rash and wound  Neurological: Positive for weakness  Negative for dizziness, tremors, light-headedness and headaches  Hematological: Does not bruise/bleed easily  Psychiatric/Behavioral: Negative for agitation, behavioral problems and suicidal ideas  The patient is not nervous/anxious and is not hyperactive  OBJECTIVE     Vitals:    21 0215 21 0430 21 0500 21 0709   BP:  115/53 114/56 126/57   BP Location:  Left arm  Left arm   Pulse: 104 98 94 95   Resp: 18 18 18 18   Temp:       TempSrc:       SpO2: 98% 97% 96% 96%   Weight:       Height:          Temperature:   Temp (24hrs), Av 5 °F (36 9 °C), Min:98 5 °F (36 9 °C), Max:98 5 °F (36 9 °C)    Temperature: 98 5 °F (36 9 °C)  Intake & Output:  I/O       701 -  0700  07 -  0700  07 -  0700    I V  (mL/kg)   1000 (11 3)    IV Piggyback  50 250    Total Intake(mL/kg)  50 (0 6) 1250 (14 1)    Net  +50 +1250               Weights:   IBW: 68 4 kg    Body mass index is 29 65 kg/m²  Weight (last 2 days)     Date/Time   Weight    21 0025   88 5 (195)            Physical Exam  Vitals signs reviewed  Constitutional:       General: He is not in acute distress  Appearance: He is well-developed  He is not diaphoretic  HENT:      Head: Normocephalic and atraumatic  Mouth/Throat:      Pharynx: No oropharyngeal exudate  Eyes:      General: No scleral icterus  Extraocular Movements: Extraocular movements intact  Conjunctiva/sclera: Conjunctivae normal    Neck:      Vascular: No JVD  Trachea: No tracheal deviation  Cardiovascular:      Rate and Rhythm: Regular rhythm  Tachycardia present  Heart sounds: No murmur  No friction rub  No gallop  Pulmonary:      Effort: Pulmonary effort is normal  No respiratory distress  Breath sounds: No stridor  No wheezing  Abdominal:      General: There is no distension  Palpations: Abdomen is soft  There is no mass  Tenderness: There is no abdominal tenderness  There is no right CVA tenderness or left CVA tenderness  Musculoskeletal: Normal range of motion  General: No tenderness  Right lower leg: No edema  Left lower leg: No edema  Comments: Bilateral antecubital fossa track marks present, worse on right upper extremity  No open wounds or drainage noted, appeared to be well healing  +TTP right paraspinal muscle T 10/11  Skin:     General: Skin is warm and dry  Coloration: Skin is not pale  Findings: No erythema  Neurological:      Mental Status: He is alert and oriented to person, place, and time  Psychiatric:         Behavior: Behavior normal          Thought Content:  Thought content normal        PAST MEDICAL HISTORY     Past Medical History:   Diagnosis Date    Drug use     Seizures ([address] )      PAST SURGICAL HISTORY     Past Surgical History:   Procedure Laterality Date    APPENDECTOMY       SOCIAL & FAMILY HISTORY     Social History     Substance and Sexual Activity   Alcohol Use Yes    Drinks per session: 3 or 4     Substance and Sexual Activity   Alcohol Use Yes    Drinks per session: 3 or 4        Substance and Sexual Activity   Drug Use Yes    Types: Prescription, Heroin, Cocaine     Social History     Tobacco Use   Smoking Status Current Every Day Smoker    Packs/day: 0 50    Types: Cigarettes   Smokeless Tobacco Never Used     Family History Problem Relation Age of Onset    No Known Problems Mother     No Known Problems Father      LABORATORY DATA     Labs: I have personally reviewed pertinent reports  Results from last 7 days   Lab Units 02/26/21  0115   WBC Thousand/uL 18 94*   HEMOGLOBIN g/dL 15 2   HEMATOCRIT % 43 8   PLATELETS Thousands/uL 295   NEUTROS PCT % 78*   MONOS PCT % 12      Results from last 7 days   Lab Units 02/26/21  0115   POTASSIUM mmol/L 3 9   CHLORIDE mmol/L 102   CO2 mmol/L 27   BUN mg/dL 17   CREATININE mg/dL 1 51*   CALCIUM mg/dL 8 7   ALK PHOS U/L 59   ALT U/L 46   AST U/L 24              Results from last 7 days   Lab Units 02/26/21  0115   INR  1 10   PTT seconds 29     Results from last 7 days   Lab Units 02/26/21  0115   LACTIC ACID mmol/L 1 9         Micro:  Lab Results   Component Value Date    BLOODCX Received in Microbiology Lab  Culture in Progress  02/26/2021    BLOODCX Received in Microbiology Lab  Culture in Progress  02/26/2021    BLOODCX No Growth After 5 Days  04/05/2020     IMAGING & DIAGNOSTIC TESTS     Imaging: I have personally reviewed pertinent reports  No results found  EKG, Pathology, and Other Studies: I have personally reviewed pertinent reports  ALLERGIES   No Known Allergies  MEDICATIONS PRIOR TO ARRIVAL     Prior to Admission medications    Medication Sig Start Date End Date Taking?  Authorizing Provider   ALPRAZolam Ros Grit) 0 5 mg tablet Take 0 5 mg by mouth 3 (three) times a day as needed for anxiety    Historical Provider, MD     MEDICATIONS ADMINISTERED IN LAST 24 HOURS     Medication Administration - last 24 hours from 02/25/2021 0937 to 02/26/2021 3148       Date/Time Order Dose Route Action Action by     02/26/2021 0802 multi-electrolyte (ISOLYTE-S PH 7 4) bolus 1,000 mL 0 mL Intravenous Stopped Amina Scott RN     02/26/2021 0207 multi-electrolyte (ISOLYTE-S PH 7 4) bolus 1,000 mL 1,000 mL Intravenous New Bag Viktoria Ahumada, RN     02/26/2021 0802 vancomycin (VANCOCIN) 1,250 mg in sodium chloride 0 9 % 250 mL IVPB 0 mg/kg Intravenous Stopped Tammy Downing RN     02/26/2021 0541 vancomycin (VANCOCIN) 1,250 mg in sodium chloride 0 9 % 250 mL IVPB 1,250 mg Intravenous New Bag Berenice Jose, JOVI     02/26/2021 0530 cefepime (MAXIPIME) 2 g/50 mL dextrose IVPB 0 mg Intravenous Stopped Tammy Downing RN     02/26/2021 0424 cefepime (MAXIPIME) 2 g/50 mL dextrose IVPB 2,000 mg Intravenous [address] Alejandra Garcia RN     02/26/2021 0804 multi-electrolyte (PLASMALYTE-A/ISOLYTE-S PH 7 4) IV solution 100 mL/hr Intravenous [address] Tammy Downing RN     02/26/2021 0805 acetaminophen (TYLENOL) tablet 650 mg 650 mg Oral Given Tammy Downing RN     02/26/2021 0910 vancomycin (VANCOCIN) IVPB (premix in dextrose) 500 mg 100 mL   Intravenous Canceled Entry Tammy Downing RN        CURRENT MEDICATIONS     Current Facility-Administered Medications   Medication Dose Route Frequency Provider Last Rate    acetaminophen  650 mg Oral Q6H PRN Ari Gannon MD      cloNIDine  0 1 mg Oral Q8H PRN Girtha Scarce, DO      loperamide  2 mg Oral Q4H PRN Girtha Scarce, DO      methocarbamol  500 mg Oral Q6H PRN Girtha Scarce, DO      multi-electrolyte  100 mL/hr Intravenous Continuous Ari Gannon  mL/hr (02/26/21 0804)    vancomycin  500 mg Intravenous Once Ari Gannon MD       multi-electrolyte, 100 mL/hr, Last Rate: 100 mL/hr (02/26/21 0804)      acetaminophen, 650 mg, Q6H PRN  cloNIDine, 0 1 mg, Q8H PRN  loperamide, 2 mg, Q4H PRN  methocarbamol, 500 mg, Q6H PRN        Admission Time  I spent 30 minutes admitting the patient  This involved direct patient contact where I performed a full history and physical, reviewing previous records, and reviewing laboratory and other diagnostic studies  Portions of the record may have been created with voice recognition software    Occasional wrong word or "sound a like" substitutions may have occurred due to the inherent limitations of voice recognition software    Read the chart carefully and recognize, using context, where substitutions have occurred     ==  Jarad Guido, [address] [facility]  Internal Medicine Residency PGY-2

## 2021-02-26 NOTE — ASSESSMENT & PLAN NOTE
creatinine on presentation was 1 51,baseline appears to be around 1  Etiology unknown possibly in setting of receiving multiple doses of Toradol versus poor p o  Intake   patient received 1 L isolate in the emergency department  · continue IV fluids  · Follow-up on UA and urine eosinophils  · follow-up BMP and trend creatinine

## 2021-02-26 NOTE — ASSESSMENT & PLAN NOTE
History of IVDU in past including methamphetamine, heroin, and cocaine  Has not used in about a year, until he relapsed 10 days ago  Used 1 time  Reports being on Suboxone, per PDMP reviewed has not been filled since July 2020  Patient did receive 1 dose of Suboxone 8/2 mg b i d, well as IV Valium, fentanyl, and Toradol at [facility]  · UDS 2/24 + cocaine and methamphetamine  · Will contact [facility] patient reports receiving medications from an confirm dosing and recent refills  · Consider toxicology consult for further management  · For now continue with p r n  Symptom control with Tylenol, clonidine, Robaxin, and loperamide p r n

## 2021-02-26 NOTE — PROGRESS NOTES
After prolonged discussion with patient, space he is decided to leave against medical advice  Patient states he has several issues going on with his life which necessitate his departure from the hospital   He states that he wants to come back at a later time, once he gets these issues straightened out  The patient denies had a long and thorough discussion on the risks of foregoing additional treatment which include endocarditis, diskitis, abscess, sepsis, death  Patient thoroughly understands these risks and wishes to leave against advice  Patient told me on multiple occasions that infectious disease attending stated to him that I will be okay for him to leave the hospital on p o  Antibiotics  I have reviewed infectious disease attending's note which directly contradicts this statement  Will discharge against medical advice at this time

## 2021-02-26 NOTE — ASSESSMENT & PLAN NOTE
Noted to have four-day history of worsening right-sided thoracic back pain, sharp in nature  Appears to be paraspinal area T10-11  Noted improvement with Robaxin and Valium received at [email or web address]  · , ESR pending  · In setting of staph A bacteremia concern for possible osteomyelitis  CTA chest abdomen pelvis did not reveal any abscess/lytic lesion  Consider MRI for further workup  · P r n   Pain management with Tylenol, Robaxin

## 2021-02-26 NOTE — ED PROVIDER NOTES
History  Chief Complaint   Patient presents with    Blood Infection     pt was recently seen at [facility] diagnosed with endocarditis; pt left today and was unhapy with the care; was told he needed ~5 weeks IV abx and possibility of a port? 71-year-old male history of IV drug use, last use over 24 hours ago, presenting due to possible endocarditis  Patient states he was recently seen at [facility] and admitted and left AMA today  States he was being treated with IV antibiotics for presumed endocarditis but he was unhappy with his care so he left today  States his symptoms started 2 days ago with fever, chest pain and dyspnea  States he has had endocarditis before as well as bacteremia and been hospitalized in the past   Denies taking anything for his symptoms today following his hospital discharge  States his injection sites or his arms denies any obvious abscess or erythema  Denies any headache, palpitations, cough, known sick contacts, weakness numbness or tingling in extremities  Prior to Admission Medications   Prescriptions Last Dose Informant Patient Reported? Taking? ALPRAZolam (XANAX) 0 5 mg tablet Not Taking at Unknown time Self Yes No   Sig: Take 0 5 mg by mouth 3 (three) times a day as needed for anxiety      Facility-Administered Medications: None       Past Medical History:   Diagnosis Date    Drug use     Seizures ([facility] [address] )        Past Surgical History:   Procedure Laterality Date    APPENDECTOMY         Family History   Problem Relation Age of Onset    No Known Problems Mother     No Known Problems Father      I have reviewed and agree with the history as documented      E-Cigarette/Vaping    E-Cigarette Use Former User      E-Cigarette/Vaping Substances     Social History     Tobacco Use    Smoking status: Current Every Day Smoker     Packs/day: 0 50     Types: Cigarettes    Smokeless tobacco: Never Used   Substance Use Topics    Alcohol use: Yes     Drinks per session: 3 or 4    Drug use: Yes     Types: Prescription, Heroin, Cocaine        Review of Systems   Constitutional: Positive for fever  Negative for chills  HENT: Negative for ear pain and sore throat  Eyes: Negative for visual disturbance  Respiratory: Positive for chest tightness and shortness of breath  Negative for cough  Cardiovascular: Positive for chest pain  Negative for palpitations  Gastrointestinal: Negative for abdominal pain and vomiting  Genitourinary: Negative for dysuria and hematuria  Musculoskeletal: Negative for arthralgias and back pain  Skin: Negative for color change and rash  Neurological: Negative for syncope  All other systems reviewed and are negative  Physical Exam  ED Triage Vitals [02/26/21 0025]   Temperature Pulse Respirations Blood Pressure SpO2   98 5 °F (36 9 °C) (!) 127 18 129/69 98 %      Temp Source Heart Rate Source Patient Position - Orthostatic VS BP Location FiO2 (%)   Oral Monitor Sitting Left arm --      Pain Score       9             Orthostatic Vital Signs  Vitals:    02/26/21 0025 02/26/21 0215 02/26/21 0430 02/26/21 0500   BP: 129/69  115/53 114/56   Pulse: (!) 127 104 98 94   Patient Position - Orthostatic VS: Sitting Lying Lying        Physical Exam  Vitals signs and nursing note reviewed  Constitutional:       Appearance: He is well-developed  HENT:      Head: Normocephalic and atraumatic  Eyes:      Conjunctiva/sclera: Conjunctivae normal    Neck:      Musculoskeletal: Neck supple  Cardiovascular:      Rate and Rhythm: Normal rate and regular rhythm  Heart sounds: No murmur  Pulmonary:      Effort: Pulmonary effort is normal  No respiratory distress  Breath sounds: Normal breath sounds  Abdominal:      Palpations: Abdomen is soft  Tenderness: There is no abdominal tenderness  Musculoskeletal: Normal range of motion  Skin:     General: Skin is warm and dry     Neurological:      Mental Status: He is alert and oriented to person, place, and time  Psychiatric:         Mood and Affect: Mood normal          Behavior: Behavior normal          Thought Content:  Thought content normal          Judgment: Judgment normal          ED Medications  Medications   vancomycin (VANCOCIN) 1,250 mg in sodium chloride 0 9 % 250 mL IVPB (1,250 mg Intravenous New Bag 2/26/21 0541)   multi-electrolyte (ISOLYTE-S PH 7 4) bolus 1,000 mL (1,000 mL Intravenous New Bag 2/26/21 0207)   cefepime (MAXIPIME) 2 g/50 mL dextrose IVPB (2,000 mg Intravenous New Bag 2/26/21 0424)       Diagnostic Studies  Results Reviewed     Procedure Component Value Units Date/Time    Procalcitonin Reflex [[id number]]  (Abnormal) Collected: 02/26/21 0423    Lab Status: Final result Specimen: Blood from Arm, Left Updated: 02/26/21 0549     Procalcitonin 0 75 ng/ml     C-reactive protein [[id number]]  (Abnormal) Collected: 02/26/21 0115    Lab Status: Final result Specimen: Blood from Arm, Left Updated: 02/26/21 0544      0 mg/L     Procalcitonin with AM Reflex [[id number]]  (Abnormal) Collected: 02/26/21 0115    Lab Status: Final result Specimen: Blood from Arm, Left Updated: 02/26/21 0226     Procalcitonin 0 95 ng/ml     Protime-INR [[id number]]  (Normal) Collected: 02/26/21 0115    Lab Status: Final result Specimen: Blood from Arm, Left Updated: 02/26/21 0158     Protime 14 2 seconds      INR 1 10    APTT [[id number]]  (Normal) Collected: 02/26/21 0115    Lab Status: Final result Specimen: Blood from Arm, Left Updated: 02/26/21 0158     PTT 29 seconds     Sedimentation rate, automated [[id number]]     Lab Status: No result Specimen: Blood     Comprehensive metabolic panel [[id number]]  (Abnormal) Collected: 02/26/21 0115    Lab Status: Final result Specimen: Blood from Arm, Left Updated: 02/26/21 0151     Sodium 135 mmol/L      Potassium 3 9 mmol/L      Chloride 102 mmol/L      CO2 27 mmol/L      ANION GAP 6 mmol/L      BUN 17 mg/dL      Creatinine 1 51 mg/dL      Glucose 125 mg/dL      Calcium 8 7 mg/dL      Corrected Calcium 9 6 mg/dL      AST 24 U/L      ALT 46 U/L      Alkaline Phosphatase 59 U/L      Total Protein 7 1 g/dL      Albumin 2 9 g/dL      Total Bilirubin 0 35 mg/dL      eGFR 62 ml/min/1 73sq m     Narrative:      [place] guidelines for Chronic Kidney Disease (CKD):     Stage 1 with normal or high GFR (GFR > 90 mL/min/1 73 square meters)    Stage 2 Mild CKD (GFR = 60-89 mL/min/1 73 square meters)    Stage 3A Moderate CKD (GFR = 45-59 mL/min/1 73 square meters)    Stage 3B Moderate CKD (GFR = 30-44 mL/min/1 73 square meters)    Stage 4 Severe CKD (GFR = 15-29 mL/min/1 73 square meters)    Stage 5 End Stage CKD (GFR <15 mL/min/1 73 square meters)  Note: GFR calculation is accurate only with a steady state creatinine    Lactic acid [[id number]]  (Normal) Collected: 02/26/21 0115    Lab Status: Final result Specimen: Blood from Arm, Left Updated: 02/26/21 0151     LACTIC ACID 1 9 mmol/L     Narrative:      Result may be elevated if tourniquet was used during collection      CBC and differential [[id number]]  (Abnormal) Collected: 02/26/21 0115    Lab Status: Final result Specimen: Blood from Arm, Left Updated: 02/26/21 0133     WBC 18 94 Thousand/uL      RBC 5 02 Million/uL      Hemoglobin 15 2 g/dL      Hematocrit 43 8 %      MCV 87 fL      MCH 30 3 pg      MCHC 34 7 g/dL      RDW 12 0 %      MPV 9 6 fL      Platelets 605 Thousands/uL      nRBC 0 /100 WBCs      Neutrophils Relative 78 %      Immat GRANS % 1 %      Lymphocytes Relative 9 %      Monocytes Relative 12 %      Eosinophils Relative 0 %      Basophils Relative 0 %      Neutrophils Absolute 14 61 Thousands/µL      Immature Grans Absolute 0 09 Thousand/uL      Lymphocytes Absolute 1 78 Thousands/µL      Monocytes Absolute 2 33 Thousand/µL      Eosinophils Absolute 0 08 Thousand/µL      Basophils Absolute 0 05 Thousands/µL     Blood culture #2 [[id number]] Collected: 02/26/21 0116    Lab Status: In process Specimen: Blood from Arm, Left Updated: 02/26/21 0132    Blood culture #1 [[id number]] Collected: 02/26/21 0116    Lab Status: In process Specimen: Blood from Hand, Left Updated: 02/26/21 0131                 XR chest 1 view portable    (Results Pending)         Procedures  Procedures      ED Course  ED Course as of Feb 26 0616 Fri Feb 26, 2021   0446 SOD was messaged at 4:07, no response yet      0526 SOD responded 4:52                            Initial Sepsis Screening     Row Name 02/26/21 0515                Is the patient's history suggestive of a new or worsening infection? (!) Yes (Proceed)  -TYREL        Suspected source of infection  endocarditis  -TYREL        Are two or more of the following signs & symptoms of infection both present and new to the patient? (!) Yes (Proceed)  -TYREL        Indicate SIRS criteria  Tachycardia > 90 bpm;Leukocytosis (WBC > 12013 IJL)  -TYREL        If the answer is yes to both questions, suspicion of sepsis is present  --        If severe sepsis is present AND tissue hypoperfusion perists in the hour after fluid resuscitation or lactate > 4, the patient meets criteria for SEPTIC SHOCK  --        Are any of the following organ dysfunction criteria present within 6 hours of suspected infection and SIRS criteria that are NOT considered to be chronic conditions? No  -TYREL        Organ dysfunction  Creatinine > 0 5 mg/dl ABOVE BASELINE  -TYREL        Date of presentation of severe sepsis  --        Time of presentation of severe sepsis  --        Tissue hypoperfusion persists in the hour after crystalloid fluid administration, evidenced, by either:  --        Was hypotension present within one hour of the conclusion of crystalloid fluid administration?   No  -TYREL        Date of presentation of septic shock  --        Time of presentation of septic shock  --          User Key  (r) = Recorded By, (t) = Taken By, (c) = Cosigned By    [address] Name Provider Type    Robb SEGURA DO Matthew Resident                    MDM  Number of Diagnoses or Management Options  Dyspnea:   Sepsis [facility]):   Diagnosis management comments: Patient does meet SIRS criteria and still high concern for endocarditis or bacteremia, although CT and ultrasound from [facility] did not show significant findings  Started on IV vanc and cefepime and admitted to SOD  Disposition  Final diagnoses:   Sepsis ([facility] [address] )   Dyspnea     Time reflects when diagnosis was documented in both MDM as applicable and the Disposition within this note     Time User Action Codes Description Comment    2/26/2021  5:29 AM Rin Burkett [A41 [id number]] Sepsis ([facility] [address] )     2/26/2021  5:29 AM Rin Burkett [R06 [id number]] Dyspnea       ED Disposition     ED Disposition Condition Date/Time Comment    Admit Stable Fri Feb 26, 2021  5:29 AM Case was discussed with SOD and the patient's admission status was agreed to be Admission Status: inpatient status to the service of Dr Camron Lokc   Follow-up Information    None         Patient's Medications   Discharge Prescriptions    No medications on file     No discharge procedures on file  PDMP Review     None           ED Provider  Attending physically available and evaluated Fariha Gonzales  GAURAV managed the patient along with the ED Attending      Electronically Signed by         Usha Roberts DO  02/26/21 2396

## 2021-02-26 NOTE — ED ATTENDING ATTESTATION
2/25/2021  I, Lorna Maya MD, saw and evaluated the patient  I have discussed the patient with the resident/non-physician practitioner and agree with the resident's/non-physician practitioner's findings, Plan of Care, and MDM as documented in the resident's/non-physician practitioner's note, except where noted  All available labs and Radiology studies were reviewed  I was present for key portions of any procedure(s) performed by the resident/non-physician practitioner and I was immediately available to provide assistance  At this point I agree with the current assessment done in the Emergency Department  I have conducted an independent evaluation of this patient a history and physical is as follows:    ED Course     Emergency Department Note- Cb Naranjo 35 y o  male MRN: [id number]    Unit/Bed#: ED 25 Encounter: [id number]    Cb Naranjo is a 35 y o  male who presents with   Chief Complaint   Patient presents with    Blood Infection     pt was recently seen at [facility] diagnosed with endocarditis; pt left today and was unhapy with the care; was told he needed ~5 weeks IV abx and possibility of a port? History of Present Illness   HPI:  Cb Naranjo is a 35 y o  male who presents for evaluation of:  Endocarditis  The patient was diagnosed with recurrent endocarditis yesterday at [facility] and was admitted the hospital there for IV antibiotics  The patient without likely way he was being treated and signed out [facility]  The patient has a history of a prior episode of endocarditis  The patient chronically injects illicit drugs in his right antecubital area  His last heroin use was yesterday about 24 hours ago  The patient notes that he had fevers yesterday but has not had a fever since yesterday  Review of Systems   Constitutional: Positive for chills ( yesterday) and fever (Yesterday)  HENT: Negative for congestion and rhinorrhea      Respiratory: Negative for cough and shortness of breath  Cardiovascular: Negative for chest pain, palpitations and leg swelling  Musculoskeletal: Positive for back pain ( left upper back pain)  Negative for neck pain  Neurological: Negative for light-headedness and headaches  All other systems reviewed and are negative  Historical Information   Past Medical History:   Diagnosis Date    Drug use     Seizures ([address] )      Past Surgical History:   Procedure Laterality Date    APPENDECTOMY       Social History   Social History     Substance and Sexual Activity   Alcohol Use Yes    Drinks per session: 3 or 4     Social History     Substance and Sexual Activity   Drug Use Yes    Types: Prescription, Heroin, Cocaine     Social History     Tobacco Use   Smoking Status Current Every Day Smoker    Packs/day: 0 50    Types: Cigarettes   Smokeless Tobacco Never Used     Family History: non-contributory    Meds/Allergies   all medications and allergies reviewed  No Known Allergies    Objective   First Vitals:   Blood Pressure: 129/69 (02/26/21 0025)  Pulse: (!) 127 (02/26/21 0025)  Temperature: 98 5 °F (36 9 °C) (02/26/21 0025)  Temp Source: Oral (02/26/21 0025)  Respirations: 18 (02/26/21 0025)  Height: 5' 8" (172 7 cm) (02/26/21 0025)  Weight - Scale: 88 5 kg (195 lb) (02/26/21 0025)  SpO2: 98 % (02/26/21 0025)    Current Vitals:   Blood Pressure: 129/69 (02/26/21 0025)  Pulse: (!) 127 (02/26/21 0025)  Temperature: 98 5 °F (36 9 °C) (02/26/21 0025)  Temp Source: Oral (02/26/21 0025)  Respirations: 18 (02/26/21 0025)  Height: 5' 8" (172 7 cm) (02/26/21 0025)  Weight - Scale: 88 5 kg (195 lb) (02/26/21 0025)  SpO2: 98 % (02/26/21 0025)    No intake or output data in the 24 hours ending 02/26/21 0137    Invasive Devices     None                 Physical Exam  Vitals signs and nursing note reviewed  Constitutional:       Appearance: Normal appearance  HENT:      Head: Normocephalic and atraumatic        Nose: Nose normal       Mouth/Throat: Mouth: Mucous membranes are moist       Pharynx: Oropharynx is clear  Eyes:      Conjunctiva/sclera: Conjunctivae normal    Cardiovascular:      Rate and Rhythm: Normal rate and regular rhythm  Pulmonary:      Effort: Pulmonary effort is normal  No respiratory distress  Abdominal:      General: Bowel sounds are normal       Palpations: Abdomen is soft  Musculoskeletal: Normal range of motion  Skin:     General: Skin is warm and dry  Capillary Refill: Capillary refill takes less than 2 seconds  Neurological:      General: No focal deficit present  Mental Status: He is alert and oriented to person, place, and time  Psychiatric:         Mood and Affect: Mood normal          Behavior: Behavior normal          Thought Content: Thought content normal          Judgment: Judgment normal        Review of electronic medical record from [facility]:  The patient had a CT scan of his chest abdomen and pelvis yesterday to rule out dissection and septic emboli; the no evidence on the CT scan and the [facility]     Medical Decision Makin  Fevers and chills in a patient with active injection drug use:  Patient has a history of prior endocarditis; plan to repeat blood cultures and admit for rule out endocarditis      Recent Results (from the past 36 hour(s))   CBC and differential    Collection Time: 21  1:15 AM   Result Value Ref Range    WBC 18 94 (H) 4 31 - 10 16 Thousand/uL    RBC 5 02 3 88 - 5 62 Million/uL    Hemoglobin 15 2 12 0 - 17 0 g/dL    Hematocrit 43 8 36 5 - 49 3 %    MCV 87 82 - 98 fL    MCH 30 3 26 8 - 34 3 pg    MCHC 34 7 31 4 - 37 4 g/dL    RDW 12 0 11 6 - 15 1 %    MPV 9 6 8 9 - 12 7 fL    Platelets 196 563 - 417 Thousands/uL    nRBC 0 /100 WBCs    Neutrophils Relative 78 (H) 43 - 75 %    Immat GRANS % 1 0 - 2 %    Lymphocytes Relative 9 (L) 14 - 44 %    Monocytes Relative 12 4 - 12 %    Eosinophils Relative 0 0 - 6 %    Basophils Relative 0 0 - 1 %    Neutrophils Absolute 14 61 (H) 1 85 - 7 62 Thousands/µL    Immature Grans Absolute 0 09 0 00 - 0 20 Thousand/uL    Lymphocytes Absolute 1 78 0 60 - 4 47 Thousands/µL    Monocytes Absolute 2 33 (H) 0 17 - 1 22 Thousand/µL    Eosinophils Absolute 0 08 0 00 - 0 61 Thousand/µL    Basophils Absolute 0 05 0 00 - 0 10 Thousands/µL     XR chest 1 view portable    (Results Pending)         Portions of the record may have been created with voice recognition software  Occasional wrong word or "sound a like" substitutions may have occurred due to the inherent limitations of voice recognition software  Read the chart carefully and recognize, using context, where substitutions have occurred  Critical Care Time  CriticalCare Time  Performed by: Lorna Maya MD  Authorized by: Lorna Maya MD     Critical care provider statement:     Critical care time (minutes):  32    Critical care time was exclusive of:  Separately billable procedures and treating other patients and teaching time    Critical care was necessary to treat or prevent imminent or life-threatening deterioration of the following conditions:  Sepsis    Critical care was time spent personally by me on the following activities:  Obtaining history from patient or surrogate, development of treatment plan with patient or surrogate, discussions with consultants, evaluation of patient's response to treatment, examination of patient, ordering and performing treatments and interventions, ordering and review of laboratory studies, ordering and review of radiographic studies, re-evaluation of patient's condition and review of old charts    I assumed direction of critical care for this patient from another provider in my specialty: no    Comments:      52-year-old male presents for re-evaluation of possible endocarditis secondary to injection opioid substances in his right antecubital area    Sepsis evaluation has been completed in the ED including blood cultures, lactate level, procalcitonin    Plan to administer intravenous antibiotics, vancomycin and cefepime intravenously, treat for possible endocarditis and admit the patient to the hospital  Yes

## 2021-02-26 NOTE — OCCUPATIONAL THERAPY NOTE
Occupational Therapy Screen  OT orders received, pt's chart reviewed  Spoke with RN, who reports pt has been completing self care and mobility I'ly in his room  Spoke with pt, who reports he has no concerns about d/c to home or completing daily activities  No acute OT needs and OT orders to be d/c at this time  Please re-consult OT if functional concerns arise during pt's hospitalization      ELI Pena, OTR/L

## 2021-02-27 ENCOUNTER — HOSPITAL ENCOUNTER (INPATIENT)
Facility: HOSPITAL | Age: 31
LOS: 4 days | Discharge: LEFT BEFORE DISCHARGE | DRG: 200 | End: 2021-03-03
Attending: EMERGENCY MEDICINE | Admitting: HOSPITALIST
Payer: COMMERCIAL

## 2021-02-27 DIAGNOSIS — F11.90 OPIOID USE DISORDER: ICD-10-CM

## 2021-02-27 DIAGNOSIS — N17.9 AKI (ACUTE KIDNEY INJURY) (HCC): ICD-10-CM

## 2021-02-27 DIAGNOSIS — R50.9 FEVER: Primary | ICD-10-CM

## 2021-02-27 DIAGNOSIS — B95.61 STAPHYLOCOCCUS AUREUS BACTEREMIA: ICD-10-CM

## 2021-02-27 DIAGNOSIS — I33.0 ACUTE BACTERIAL ENDOCARDITIS: ICD-10-CM

## 2021-02-27 DIAGNOSIS — R07.9 CHEST PAIN: ICD-10-CM

## 2021-02-27 DIAGNOSIS — R78.81 STAPHYLOCOCCUS AUREUS BACTEREMIA: ICD-10-CM

## 2021-02-27 LAB
AMPHETAMINES SERPL QL SCN: POSITIVE
ANION GAP SERPL CALCULATED.3IONS-SCNC: 4 MMOL/L (ref 4–13)
BARBITURATES UR QL: NEGATIVE
BASOPHILS # BLD AUTO: 0.04 THOUSANDS/ΜL (ref 0–0.1)
BASOPHILS NFR BLD AUTO: 0 % (ref 0–1)
BENZODIAZ UR QL: POSITIVE
BUN SERPL-MCNC: 11 MG/DL (ref 5–25)
CALCIUM SERPL-MCNC: 8.4 MG/DL (ref 8.3–10.1)
CHLORIDE SERPL-SCNC: 107 MMOL/L (ref 100–108)
CO2 SERPL-SCNC: 30 MMOL/L (ref 21–32)
COCAINE UR QL: POSITIVE
CREAT SERPL-MCNC: 0.88 MG/DL (ref 0.6–1.3)
EOSINOPHIL # BLD AUTO: 0.5 THOUSAND/ΜL (ref 0–0.61)
EOSINOPHIL NFR BLD AUTO: 5 % (ref 0–6)
ERYTHROCYTE [DISTWIDTH] IN BLOOD BY AUTOMATED COUNT: 11.9 % (ref 11.6–15.1)
GFR SERPL CREATININE-BSD FRML MDRD: 116 ML/MIN/1.73SQ M
GLUCOSE SERPL-MCNC: 139 MG/DL (ref 65–140)
HCT VFR BLD AUTO: 38.6 % (ref 36.5–49.3)
HGB BLD-MCNC: 13 G/DL (ref 12–17)
IMM GRANULOCYTES # BLD AUTO: 0.05 THOUSAND/UL (ref 0–0.2)
IMM GRANULOCYTES NFR BLD AUTO: 1 % (ref 0–2)
LYMPHOCYTES # BLD AUTO: 2.33 THOUSANDS/ΜL (ref 0.6–4.47)
LYMPHOCYTES NFR BLD AUTO: 23 % (ref 14–44)
MCH RBC QN AUTO: 29.9 PG (ref 26.8–34.3)
MCHC RBC AUTO-ENTMCNC: 33.7 G/DL (ref 31.4–37.4)
MCV RBC AUTO: 89 FL (ref 82–98)
METHADONE UR QL: NEGATIVE
MONOCYTES # BLD AUTO: 1.13 THOUSAND/ΜL (ref 0.17–1.22)
MONOCYTES NFR BLD AUTO: 11 % (ref 4–12)
NEUTROPHILS # BLD AUTO: 6.31 THOUSANDS/ΜL (ref 1.85–7.62)
NEUTS SEG NFR BLD AUTO: 60 % (ref 43–75)
NRBC BLD AUTO-RTO: 0 /100 WBCS
OPIATES UR QL SCN: POSITIVE
OXYCODONE+OXYMORPHONE UR QL SCN: NEGATIVE
PCP UR QL: NEGATIVE
PLATELET # BLD AUTO: 285 THOUSANDS/UL (ref 149–390)
PMV BLD AUTO: 9 FL (ref 8.9–12.7)
POTASSIUM SERPL-SCNC: 3.3 MMOL/L (ref 3.5–5.3)
RBC # BLD AUTO: 4.35 MILLION/UL (ref 3.88–5.62)
SODIUM SERPL-SCNC: 141 MMOL/L (ref 136–145)
THC UR QL: NEGATIVE
WBC # BLD AUTO: 10.36 THOUSAND/UL (ref 4.31–10.16)

## 2021-02-27 PROCEDURE — 99285 EMERGENCY DEPT VISIT HI MDM: CPT | Performed by: EMERGENCY MEDICINE

## 2021-02-27 PROCEDURE — 36415 COLL VENOUS BLD VENIPUNCTURE: CPT | Performed by: STUDENT IN AN ORGANIZED HEALTH CARE EDUCATION/TRAINING PROGRAM

## 2021-02-27 PROCEDURE — 85025 COMPLETE CBC W/AUTO DIFF WBC: CPT | Performed by: STUDENT IN AN ORGANIZED HEALTH CARE EDUCATION/TRAINING PROGRAM

## 2021-02-27 PROCEDURE — 99222 1ST HOSP IP/OBS MODERATE 55: CPT | Performed by: HOSPITALIST

## 2021-02-27 PROCEDURE — NC001 PR NO CHARGE: Performed by: HOSPITALIST

## 2021-02-27 PROCEDURE — 80307 DRUG TEST PRSMV CHEM ANLYZR: CPT | Performed by: STUDENT IN AN ORGANIZED HEALTH CARE EDUCATION/TRAINING PROGRAM

## 2021-02-27 PROCEDURE — 80048 BASIC METABOLIC PNL TOTAL CA: CPT | Performed by: STUDENT IN AN ORGANIZED HEALTH CARE EDUCATION/TRAINING PROGRAM

## 2021-02-27 PROCEDURE — 99255 IP/OBS CONSLTJ NEW/EST HI 80: CPT | Performed by: INTERNAL MEDICINE

## 2021-02-27 PROCEDURE — 99284 EMERGENCY DEPT VISIT MOD MDM: CPT

## 2021-02-27 RX ORDER — KETOROLAC TROMETHAMINE 30 MG/ML
15 INJECTION, SOLUTION INTRAMUSCULAR; INTRAVENOUS ONCE
Status: COMPLETED | OUTPATIENT
Start: 2021-02-27 | End: 2021-02-27

## 2021-02-27 RX ORDER — LINEZOLID 600 MG/1
600 TABLET, FILM COATED ORAL EVERY 12 HOURS SCHEDULED
Qty: 28 TABLET | Refills: 0 | Status: SHIPPED | OUTPATIENT
Start: 2021-02-27 | End: 2021-02-27

## 2021-02-27 RX ORDER — LOPERAMIDE HYDROCHLORIDE 2 MG/1
2 CAPSULE ORAL EVERY 4 HOURS PRN
Status: DISCONTINUED | OUTPATIENT
Start: 2021-02-27 | End: 2021-03-03 | Stop reason: HOSPADM

## 2021-02-27 RX ORDER — METHOCARBAMOL 500 MG/1
500 TABLET, FILM COATED ORAL EVERY 6 HOURS PRN
Status: DISCONTINUED | OUTPATIENT
Start: 2021-02-27 | End: 2021-03-03 | Stop reason: HOSPADM

## 2021-02-27 RX ORDER — LANOLIN ALCOHOL/MO/W.PET/CERES
3 CREAM (GRAM) TOPICAL
Status: DISCONTINUED | OUTPATIENT
Start: 2021-02-27 | End: 2021-03-03 | Stop reason: HOSPADM

## 2021-02-27 RX ORDER — DIAZEPAM 5 MG/1
5 TABLET ORAL ONCE
Status: COMPLETED | OUTPATIENT
Start: 2021-02-27 | End: 2021-02-27

## 2021-02-27 RX ORDER — CEFAZOLIN SODIUM 2 G/50ML
2000 SOLUTION INTRAVENOUS EVERY 8 HOURS
Status: DISCONTINUED | OUTPATIENT
Start: 2021-02-27 | End: 2021-02-27

## 2021-02-27 RX ORDER — DIAZEPAM 5 MG/1
5 TABLET ORAL EVERY 6 HOURS PRN
Status: DISCONTINUED | OUTPATIENT
Start: 2021-02-27 | End: 2021-02-28

## 2021-02-27 RX ORDER — ACETAMINOPHEN 325 MG/1
650 TABLET ORAL EVERY 6 HOURS PRN
Status: DISCONTINUED | OUTPATIENT
Start: 2021-02-27 | End: 2021-03-03 | Stop reason: HOSPADM

## 2021-02-27 RX ORDER — CLONIDINE HYDROCHLORIDE 0.1 MG/1
0.2 TABLET ORAL EVERY 12 HOURS SCHEDULED
Status: DISCONTINUED | OUTPATIENT
Start: 2021-02-27 | End: 2021-02-28

## 2021-02-27 RX ORDER — SODIUM CHLORIDE, SODIUM GLUCONATE, SODIUM ACETATE, POTASSIUM CHLORIDE, MAGNESIUM CHLORIDE, SODIUM PHOSPHATE, DIBASIC, AND POTASSIUM PHOSPHATE .53; .5; .37; .037; .03; .012; .00082 G/100ML; G/100ML; G/100ML; G/100ML; G/100ML; G/100ML; G/100ML
100 INJECTION, SOLUTION INTRAVENOUS CONTINUOUS
Status: DISCONTINUED | OUTPATIENT
Start: 2021-02-27 | End: 2021-02-28

## 2021-02-27 RX ORDER — POTASSIUM CHLORIDE 20 MEQ/1
40 TABLET, EXTENDED RELEASE ORAL ONCE
Status: DISCONTINUED | OUTPATIENT
Start: 2021-02-27 | End: 2021-02-27

## 2021-02-27 RX ORDER — POTASSIUM CHLORIDE 20 MEQ/1
40 TABLET, EXTENDED RELEASE ORAL ONCE
Status: COMPLETED | OUTPATIENT
Start: 2021-02-27 | End: 2021-02-27

## 2021-02-27 RX ORDER — CEFAZOLIN SODIUM 2 G/50ML
2000 SOLUTION INTRAVENOUS EVERY 8 HOURS
Status: DISCONTINUED | OUTPATIENT
Start: 2021-02-27 | End: 2021-03-03 | Stop reason: HOSPADM

## 2021-02-27 RX ORDER — KETOROLAC TROMETHAMINE 30 MG/ML
15 INJECTION, SOLUTION INTRAMUSCULAR; INTRAVENOUS ONCE
Status: DISCONTINUED | OUTPATIENT
Start: 2021-02-27 | End: 2021-02-27

## 2021-02-27 RX ADMIN — CLONIDINE HYDROCHLORIDE 0.2 MG: 0.2 TABLET ORAL at 21:47

## 2021-02-27 RX ADMIN — CEFAZOLIN SODIUM 2000 MG: 2 SOLUTION INTRAVENOUS at 18:08

## 2021-02-27 RX ADMIN — CLONIDINE HYDROCHLORIDE 0.2 MG: 0.2 TABLET ORAL at 12:29

## 2021-02-27 RX ADMIN — SODIUM CHLORIDE, SODIUM GLUCONATE, SODIUM ACETATE, POTASSIUM CHLORIDE, MAGNESIUM CHLORIDE, SODIUM PHOSPHATE, DIBASIC, AND POTASSIUM PHOSPHATE 100 ML/HR: .53; .5; .37; .037; .03; .012; .00082 INJECTION, SOLUTION INTRAVENOUS at 13:34

## 2021-02-27 RX ADMIN — CEFAZOLIN SODIUM 2000 MG: 2 SOLUTION INTRAVENOUS at 02:41

## 2021-02-27 RX ADMIN — DIAZEPAM 5 MG: 5 TABLET ORAL at 01:54

## 2021-02-27 RX ADMIN — KETOROLAC TROMETHAMINE 15 MG: 30 INJECTION, SOLUTION INTRAMUSCULAR at 01:54

## 2021-02-27 RX ADMIN — METHOCARBAMOL 500 MG: 500 TABLET ORAL at 14:38

## 2021-02-27 RX ADMIN — CLONIDINE HYDROCHLORIDE 0.2 MG: 0.2 TABLET ORAL at 02:40

## 2021-02-27 RX ADMIN — CEFAZOLIN SODIUM 2000 MG: 2 SOLUTION INTRAVENOUS at 12:30

## 2021-02-27 RX ADMIN — SODIUM CHLORIDE, SODIUM GLUCONATE, SODIUM ACETATE, POTASSIUM CHLORIDE, MAGNESIUM CHLORIDE, SODIUM PHOSPHATE, DIBASIC, AND POTASSIUM PHOSPHATE 100 ML/HR: .53; .5; .37; .037; .03; .012; .00082 INJECTION, SOLUTION INTRAVENOUS at 02:41

## 2021-02-27 RX ADMIN — MELATONIN 3 MG: at 21:47

## 2021-02-27 RX ADMIN — POTASSIUM CHLORIDE 40 MEQ: 1500 TABLET, EXTENDED RELEASE ORAL at 12:30

## 2021-02-27 NOTE — DISCHARGE SUMMARY
INTERNAL MEDICINE RESIDENCY DISCHARGE SUMMARY     Antwan Lenz   35 y o  male  MRN: [id number]  Room/Bed: [place] [address]/[facility] [address] [place] [address]   Encounter: [id number]    DISCHARGE INFORMATION     PCP at Discharge: Danny Diaz DO    Admitting Provider: Umesh Rubio MD  Admission Date: 2/26/2021    Discharge Provider: Umesh Rubio MD  Discharge Date: 2/26/2021    Discharge Disposition: Home/Self Care  Discharge Condition: fair  Discharge with Lines: no    Discharge Diet: regular diet  Activity Restrictions: none  Test Results Pending at Discharge: None    Discharge Diagnoses:  Principal Problem:    Staphylococcus aureus bacteremia  Active Problems:    Opioid use disorder ([facility] )    Hepatitis C    TRAVIS (acute kidney injury) ([facility] )    Acute right-sided thoracic back pain  Resolved Problems:    * No resolved hospital problems  *    Acute right-sided thoracic back pain  Assessment & Plan  Noted to have four-day history of worsening right-sided thoracic back pain, sharp in nature  Appears to be paraspinal area T10-11  Noted improvement with Robaxin and Valium received at [facility]  · , ESR pending  · In setting of staph A bacteremia concern for possible osteomyelitis  CTA chest abdomen pelvis did not reveal any abscess/lytic lesion  Consider MRI for further workup  · P r n  Pain management with Tylenol, Robaxin    TRAVIS (acute kidney injury) ([facility] )  Assessment & Plan  creatinine on presentation was 1 51,baseline appears to be around 1  Etiology unknown possibly in setting of receiving multiple doses of Toradol versus poor p o  Intake  patient received 1 L isolate in the emergency department  · continue IV fluids  · Follow-up on UA and urine eosinophils  · follow-up BMP and trend creatinine    Hepatitis C  Assessment & Plan  Per chart review, chronic hepatitis C   Did see Dr Sam Amos outpatient in November of 2020, and was started on [address] on 12/14/2020, but patient reports stopping treatment correction as he did not receive the second pack of medications in mail yet  · Will need close outpatient follow-up with GI for treatment     Opioid use disorder [facility])  Assessment & Plan  History of IVDU in past including methamphetamine, heroin, and cocaine  Has not used in about a year, until he relapsed 10 days ago  Used 1 time  Reports being on Suboxone, per PDMP reviewed has not been filled since July 2020  Patient did receive 1 dose of Suboxone 8/2 mg b i d, well as IV Valium, fentanyl, and Toradol at [facility]  · UDS 2/24 + cocaine and methamphetamine  · Will contact [facility] patient reports receiving medications from an confirm dosing and recent refills  · Consider toxicology consult for further management  · For now continue with p r n  Symptom control with Tylenol, clonidine, Robaxin, and loperamide p r n     * Staphylococcus aureus bacteremia  Assessment & Plan  Hx of MSSA bacteremia and possible septic pulmonary emboli through 04/03/2020 (tx with 4 days of cefazolin IV while admitted at [facility], then one week of  daptomycin IV at infusion center through 03/17/2020, and Patient was transitioned to linezolid p o  For the last 2 weeks of his antibiotic course)  Patient has a history of IV drug abuse and presented to [facility] 2 days ago with fever chest pain and dyspnea  Was receiving IV antibiotics for suspected endocarditis but decided to leave [place] as he was unhappy with care and decided to come to [facility]  Per chart review, 1/2 blood cultures positive for Staph a susceptible to vancomycin  CTA C/A/P w/o acute abnormalities and echocardiogram at [facility] did not show any signs of embolism or septic emboli  EKG sinus tachycardia with no acute ST/T-wave changes  + leukocytosis at 18,000  , procalcitonin 0 75    · Continue with vancomycin  · Will likely needed MAURA  · Concern for osteomyelitis given midthoracic back pain, consider MRI vs CT of thoracic spine    · Follow-up on repeat blood cultures  · Infectious disease consulted, appreciate recommendations  · Monitor fever curve white blood cell count  Consulting Providers:      Diagnostic & Therapeutic Procedures Performed:  Xr Chest 1 View Portable    Result Date: 2/26/2021  Impression: No acute cardiopulmonary disease  Workstation performed: [id number]       Code Status: Level 1 - Full Code  Advance Directive & Living Will: <no information>  Power of :    POLST:      Medications:  Current Discharge Medication List        Current Discharge Medication List      START taking these medications    Details   linezolid (ZYVOX) 600 mg tablet Take 1 tablet (600 mg total) by mouth every 12 (twelve) hours for 14 days  Qty: 28 tablet, Refills: 0    Associated Diagnoses: Staphylococcus aureus bacteremia           Current Discharge Medication List      CONTINUE these medications which have NOT CHANGED    Details   ALPRAZolam (XANAX) 0 5 mg tablet Take 0 5 mg by mouth 3 (three) times a day as needed for anxiety           Allergies:  No Known Allergies  FOLLOW-UP     PCP Outpatient Follow-up:  none required    Consulting Providers Follow-up:  none required     Active Issues Requiring Follow-up:   Staph Aureus Bacteremia    DETAILS OF HOSPITAL COURSE     HPI as per DR Rubio Scarce: "Patient is a 20-year-old male with a past medical history of IV drug abuse and staph aureus bacteremia/endocarditis in 3/2020 and Hep C who presented to the emergency department today with suspicion for endocarditis  He had originally presented to [facility] yesterday with 2 days of fever, chest pain and dyspnea and was admitted and being worked up for endocarditis  He was started on antibiotics at [facility], however he was not happy with the treatment he was receiving and decided to leave [place] and come to [facility]    CT chest and echocardiogram at [facility] did not show any signs of embolism or septic emboli  Blood cultures are still pending  He has a history of IV drug abuse and last used IV drugs around 2 or 3 days ago  He currently reports Suboxone at home  Today he is complaining of chest and upper back pain  States the chest pain is from radiation of back pain anteriorly  Sharp, non exertional  Today he is not complaining of any fevers, chills, shortness of breath, abdominal pain, nausea, vomiting, diarrhea or neurological deficits      In the emergency department the patient's vital signs were stable  His laboratory study showed a creatinine of 1 51 and a white blood cell count of 18 94  Procalcitonin was mildly elevated at 0 95 and 0 75  CRP was elevated at 183  Lactic acid was normal   Blood cultures were drawn and the patient was started on IV vancomycin and cefepime  Patient was admitted for further management workup of staph a bacteremia "    The patient was admitted and was started on vancomycin  Repeat blood cultures were obtained and not infectious Disease was consulted  The patient was started on IV fluids for TRAVIS with creatinine of 1 51  The patient was seen by infectious diseases and the vancomycin was switched to cefazolin 2 g IV q 8  A MAURA was also ordered  On the night of 02/27/2021 the patient decided that he wanted to leave AMA because he had to get back to his business  The risks of leaving AMA were discussed with the patient including the possibility for death due to his Staph aureus bacteremia  The patient was insistent that he wanted to leave and signed out [place] later that night  Fourteen days of p o  Linezolid 600 mg b i d  was ordered to the patient's pharmacy and the patient was discharged  Discharge Statement:   I spent 1 hour minutes discharging the patient  This time was spent on the day of discharge  I had direct contact with the patient on the day of discharge   Additional documentation is required if more than 30 minutes were spent on discharge     ==  Mariana Og Alyssa Dickinson MD  IM Resident, PGY-1  [address] Internal Medicine Residency

## 2021-02-27 NOTE — H&P
INTERNAL MEDICINE RESIDENCY ADMISSION H&P     Name: Kamran Velez   Age & Sex: 35 y o  male   MRN: [id number]  Unit/Bed#: ED 15   Encounter: [id number]  Primary Care Provider: No primary care provider on file  Code Status: Level 1 - Full Code  Admission Status: INPATIENT   Admit To: SOD Team A  Disposition: Patient requires Med/Surg    ASSESSMENT/PLAN     Active Problems:    Opioid use disorder ([facility] )    Staphylococcus aureus bacteremia    Hepatitis C    TRAVIS (acute kidney injury) ([facility] )    Acute right-sided thoracic back pain    Acute right-sided thoracic back pain  Assessment & Plan  Noted to have four-day history of worsening right-sided thoracic back pain, sharp in nature  Appears to be paraspinal area T10-11  Noted improvement with Robaxin and Valium received at [facility]  · , ESR pending  · In setting of staph A bacteremia concern for possible osteomyelitis  CTA chest abdomen pelvis did not reveal any abscess/lytic lesion  Consider MRI for further workup  · P r n  Pain management with Tylenol, Robaxin    TRAVIS (acute kidney injury) ([facility] )  Assessment & Plan  creatinine on presentation was 1 51,baseline appears to be around 1  Etiology unknown possibly in setting of receiving multiple doses of Toradol versus poor p o  Intake  patient received 1 L isolate in the emergency department  · continue IV fluids  · Follow-up on UA and urine eosinophils  · follow-up BMP and trend creatinine    Hepatitis C  Assessment & Plan  Per chart review, chronic hepatitis C  Did see Dr Sarah Hylton outpatient in November of 2020, and was started on [address] on 12/14/2020, but patient reports stopping treatment intermediate as he did not receive the second pack of medications in mail yet     · Will need close outpatient follow-up with GI for treatment     *Staphylococcus aureus bacteremia  Assessment & Plan  Hx of MSSA bacteremia and possible septic pulmonary emboli through 04/03/2020 (tx with 4 days of cefazolin IV while admitted at [facility], then one week of  daptomycin IV at infusion center through 03/17/2020, and Patient was transitioned to linezolid p o  For the last 2 weeks of his antibiotic course)     Patient has a history of IV drug abuse and presented to [facility] 2 days ago with fever chest pain and dyspnea  Was receiving IV antibiotics for suspected endocarditis but decided to leave [place] as he was unhappy with care and decided to come to [facility]     Per chart review, 1/2 blood cultures positive for Staph a susceptible to vancomycin  CTA C/A/P w/o acute abnormalities and echocardiogram at [facility] did not show any signs of embolism or septic emboli  EKG sinus tachycardia with no acute ST/T-wave changes  + leukocytosis at 18,000  , procalcitonin 0 75  · Vancomycin was discontinued on 02/26 and the patient was started on IV cefazolin  · Will likely needed MAURA  · Concern for osteomyelitis given midthoracic back pain, consider MRI vs CT of thoracic spine  · Follow-up on repeat blood cultures  · Infectious disease consulted, appreciate recommendations  · Monitor fever curve white blood cell count  Opioid use disorder [facility])  Assessment & Plan  History of IVDU in past including methamphetamine, heroin, and cocaine  Has not used in about a year, until he relapsed 10 days ago  Used 1 time  Reports being on Suboxone, per PDMP reviewed has not been filled since July 2020  Patient did receive 1 dose of Suboxone 8/2 mg b i d, well as IV Valium, fentanyl, and Toradol at [facility]  · UDS 2/24 + cocaine and methamphetamine  · Will contact [facility] patient reports receiving medications from an confirm dosing and recent refills  · Consider toxicology consult for further management  · For now continue with p r n  Symptom control with Tylenol, clonidine, Robaxin, and loperamide p r n        VTE Pharmacologic Prophylaxis: Reason for no pharmacologic prophylaxis Low Risk  VTE Mechanical Prophylaxis: sequential compression device    CHIEF COMPLAINT     Chief Complaint   Patient presents with    Medical Problem     Pt reports that he has a blood infection he has been dealing with for a couple of days  Pt reports that they think was from when he had a relapse a couple of weeks ago  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, thats whyh he left [place] earlier today  Back for treatment      HISTORY OF PRESENT ILLNESS     The patient is a 67-year-old male with a past medical history of IV drug abuse and Staph aureus bacteremia/endocarditis in 03/2020 and untreated hep C who was recently admitted to [facility] yesterday and decided to leave AMA on the night of 2/26 and is now returning to the emergency department  He was gone for a total of 3 hours  He stated that he left because he had to "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" and has now returned  He denied using any drugs in the time he was gone  He continues to complain of left-sided back pain  He was also recently admitted to [facility] a few days ago and was being worked up for possible endocarditis but decided to leave AMA because he was not happy with the care he was receiving there  He denies any new fevers, chills, shortness of breath, abdominal pain, nausea, vomiting, diarrhea or not neurological deficits  In the emergency department the patient had a temperature of 97 3°, pulse of 97, respiratory rate of 18, blood pressure of 153/75 and was satting 97% on room air  CBC and BMP are pending  No other labs were drawn in the emergency department because the patient was previously admitted yesterday  REVIEW OF SYSTEMS     Review of Systems   Constitutional: Positive for fatigue  Negative for activity change, appetite change, chills and fever  HENT: Negative for congestion, ear discharge, ear pain, hearing loss, sinus pain, sore throat, tinnitus and trouble swallowing  Eyes: Negative for pain and visual disturbance     Respiratory: Negative for cough, chest tightness, shortness of breath and wheezing  Cardiovascular: Negative for chest pain and leg swelling  Gastrointestinal: Negative for abdominal distention, abdominal pain, anal bleeding and blood in stool  Endocrine: Negative for cold intolerance and heat intolerance  Genitourinary: Negative for difficulty urinating, dysuria and frequency  Musculoskeletal: Positive for back pain  Negative for arthralgias and myalgias  Skin: Negative for rash  Allergic/Immunologic: Negative for environmental allergies and food allergies  Neurological: Negative for dizziness, light-headedness, numbness and headaches  Hematological: Negative for adenopathy  Psychiatric/Behavioral: Negative for agitation, behavioral problems and confusion  OBJECTIVE     Vitals:    21 0108 21 0130   BP: 153/75 141/70   BP Location: Right arm    Pulse: 97 84   Resp: 18 16   Temp: (!) 97 3 °F (36 3 °C)    TempSrc: Oral    SpO2: 97% 97%   Weight: 88 5 kg (195 lb)    Height: 5' 8" (1 727 m)       Temperature:   Temp (24hrs), Av 3 °F (36 3 °C), Min:97 3 °F (36 3 °C), Max:97 3 °F (36 3 °C)    Temperature: (!) 97 3 °F (36 3 °C)  Intake & Output:  I/O     None        No intake or output data in the 24 hours ending 21 0223  No intake/output data recorded  Weights:   IBW: 68 4 kg    Body mass index is 29 65 kg/m²  Weight (last 2 days)     Date/Time   Weight    21 0108   88 5 (195)            Physical Exam  Constitutional:       Appearance: He is well-developed  HENT:      Head: Normocephalic and atraumatic  Nose: Nose normal    Eyes:      General:         Right eye: No discharge  Left eye: No discharge  Conjunctiva/sclera: Conjunctivae normal       Pupils: Pupils are equal, round, and reactive to light  Neck:      Thyroid: No thyromegaly  Cardiovascular:      Rate and Rhythm: Normal rate and regular rhythm  Heart sounds: Normal heart sounds  No murmur  No friction rub  No gallop  Pulmonary:      Effort: Pulmonary effort is normal  No respiratory distress  Breath sounds: Normal breath sounds  No stridor  No wheezing or rales  Chest:      Chest wall: No tenderness  Abdominal:      General: Bowel sounds are normal  There is no distension  Palpations: Abdomen is soft  There is no mass  Tenderness: There is no abdominal tenderness  There is no guarding or rebound  Musculoskeletal:         General: No tenderness or deformity  Right lower leg: No edema  Left lower leg: No edema  Comments: Bilateral antecubital fossa track marks present, worse on right upper extremity  No open wounds or drainage noted, appeared to be well healing  Tenderness to palpation of right paraspinal muscle T 10/11  Lymphadenopathy:      Cervical: No cervical adenopathy  Skin:     General: Skin is warm and dry  Neurological:      Mental Status: He is alert and oriented to person, place, and time  Psychiatric:         Mood and Affect: Mood normal          Behavior: Behavior normal          Thought Content:  Thought content normal          Judgment: Judgment normal        PAST MEDICAL HISTORY     Past Medical History:   Diagnosis Date    Drug use     Seizures ([address] )      PAST SURGICAL HISTORY     Past Surgical History:   Procedure Laterality Date    APPENDECTOMY       SOCIAL & FAMILY HISTORY     Social History     Substance and Sexual Activity   Alcohol Use Yes    Frequency: 2-3 times a week    Drinks per session: 3 or 4     Substance and Sexual Activity   Alcohol Use Yes    Frequency: 2-3 times a week    Drinks per session: 3 or 4        Substance and Sexual Activity   Drug Use Yes    Types: Prescription, Heroin, Cocaine    Comment: Pt reports last use on wednesday     Social History     Tobacco Use   Smoking Status Current Some Day Smoker    Packs/day: 0 00    Types: Cigarettes   Smokeless Tobacco Never Used     Family History   Problem Relation Age of Onset    No Known Problems Mother     No Known Problems Father      LABORATORY DATA     Labs: I have personally reviewed pertinent reports  Results from last 7 days   Lab Units 02/26/21 0115   WBC Thousand/uL 18 94*   HEMOGLOBIN g/dL 15 2   HEMATOCRIT % 43 8   PLATELETS Thousands/uL 295   NEUTROS PCT % 78*   MONOS PCT % 12      Results from last 7 days   Lab Units 02/26/21 0115   POTASSIUM mmol/L 3 9   CHLORIDE mmol/L 102   CO2 mmol/L 27   BUN mg/dL 17   CREATININE mg/dL 1 51*   CALCIUM mg/dL 8 7   ALK PHOS U/L 59   ALT U/L 46   AST U/L 24     Serum creatinine: 1 51 mg/dL (H) 02/26/21 0115  Estimated creatinine clearance: 78 mL/min (A)            Results from last 7 days   Lab Units 02/26/21 0115   INR  1 10   PTT seconds 29     Results from last 7 days   Lab Units 02/26/21 0115   LACTIC ACID mmol/L 1 9         Micro:  Lab Results   Component Value Date    BLOODCX Received in Microbiology Lab  Culture in Progress  02/26/2021    BLOODCX Received in Microbiology Lab  Culture in Progress  02/26/2021    BLOODCX No Growth After 5 Days  04/05/2020     IMAGING & DIAGNOSTIC TESTS     Imaging: I have personally reviewed pertinent reports  No results found  EKG, Pathology, and Other Studies: I have personally reviewed pertinent reports  ALLERGIES   No Known Allergies  MEDICATIONS PRIOR TO ARRIVAL     Prior to Admission medications    Medication Sig Start Date End Date Taking?  Authorizing Provider   ALPRAZolam Rannie Bastrop) 0 5 mg tablet Take 0 5 mg by mouth 3 (three) times a day as needed for anxiety   Yes Historical Provider, MD   linezolid (ZYVOX) 600 mg tablet Take 1 tablet (600 mg total) by mouth every 12 (twelve) hours for 14 days 2/27/21 2/27/21  Adrián Dia MD     MEDICATIONS ADMINISTERED IN LAST 24 HOURS     Medication Administration - last 24 hours from 02/26/2021 0223 to 02/27/2021 0223       Date/Time Order Dose Route Action Action by     02/27/2021 0148 ketorolac (TORADOL) injection 15 mg 15 mg Intramuscular Not Given Susannah Avery RN     02/27/2021 0154 diazepam (VALIUM) tablet 5 mg 5 mg Oral Given Susannah Avery RN     02/27/2021 0154 ketorolac (TORADOL) injection 15 mg 15 mg Intravenous Given Millicent Jimenez RN        CURRENT MEDICATIONS     Current Facility-Administered Medications   Medication Dose Route Frequency Provider Last Rate    acetaminophen  650 mg Oral Q6H PRN Angela Reynaga MD      cefazolin  2,000 mg Intravenous Q8H Angela Reynaga MD      cloNIDine  0 2 mg Oral Q12H [facility] Angela Reynaga MD      diazepam  5 mg Oral Q6H PRN Angela Reynaga MD      loperamide  2 mg Oral Q4H PRN Angela Reynaga MD      melatonin  3 mg Oral HS Angela Reynaga MD      methocarbamol  500 mg Oral Q6H PRN Angela Reynaga MD      multi-electrolyte  100 mL/hr Intravenous Continuous Angela Reynaga MD      nicotine  1 patch Transdermal Daily Angela Reynaga MD       multi-electrolyte, 100 mL/hr      acetaminophen, 650 mg, Q6H PRN  diazepam, 5 mg, Q6H PRN  loperamide, 2 mg, Q4H PRN  methocarbamol, 500 mg, Q6H PRN      Admission Time  I spent 1 hour admitting the patient    This involved direct patient contact where I performed a full history and physical, reviewing previous records, and reviewing laboratory and other diagnostic studies     ==  Angela Reynaga MD  IM Resident, PGY-1  [facility] Internal Medicine Res[facility]

## 2021-02-27 NOTE — ASSESSMENT & PLAN NOTE
Resolved  Noted to have four-day history of worsening right-sided thoracic back pain, sharp in nature  Appears to be paraspinal area T10-11  Noted  improvement with Robaxin and Valium received at "[employer] [facility]  likely musculoskeletal  · , ESR 86  Benign exam, hold off on MRI at this time  · P r n   Pain management with Tylenol, Robaxin

## 2021-02-27 NOTE — ED ATTENDING ATTESTATION
2/27/2021  I, Real Herring MD, saw and evaluated the patient  I have discussed the patient with the resident/non-physician practitioner and agree with the resident's/non-physician practitioner's findings, Plan of Care, and MDM as documented in the resident's/non-physician practitioner's note, except where noted  All available labs and Radiology studies were reviewed  I was present for key portions of any procedure(s) performed by the resident/non-physician practitioner and I was immediately available to provide assistance  At this point I agree with the current assessment done in the Emergency Department  I have conducted an independent evaluation of this patient a history and physical is as follows:    ED Course     Emergency Department Note- Heidy Ribeiro 35 y o  male MRN: [id number]    Unit/Bed#: ED 13 Encounter: [id number]    Heidy Ribeiro is a 35 y o  male who presents with   Chief Complaint   Patient presents with   Suzy Ames Medical Problem     Pt reports that he has a blood infection he has been dealing with for a couple of days  Pt reports that they think was from when he had a relapse a couple of weeks ago  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, thats whyh he left [place] earlier today  Back for treatment         History of Present Illness   HPI:  Heidy Ribeiro is a 35 y o  male who presents for evaluation of:  Endocarditis evaluation  The patient was admitted last night for evaluation of possible endocarditis  The patient eloped from the hospital earlier yesterday evening  The patient has now returned for treatment of his possible endocarditis  Patient denies any illicit drug abuse while out of the hospital     Review of Systems   Constitutional: Negative for chills and fever  HENT: Negative for congestion and rhinorrhea  Respiratory: Negative for cough and shortness of breath  Gastrointestinal: Negative for nausea and vomiting     Neurological: Negative for light-headedness and headaches  All other systems reviewed and are negative  Historical Information   Past Medical History:   Diagnosis Date    Drug use     Seizures ([address] )      Past Surgical History:   Procedure Laterality Date    APPENDECTOMY       Social History   Social History     Substance and Sexual Activity   Alcohol Use Yes    Frequency: 2-3 times a week    Drinks per session: 3 or 4     Social History     Substance and Sexual Activity   Drug Use Yes    Types: Prescription, Heroin, Cocaine    Comment: Pt reports last use on wednesday     Social History     Tobacco Use   Smoking Status Current Some Day Smoker    Packs/day: 0 00    Types: Cigarettes   Smokeless Tobacco Never Used     Family History: non-contributory    Meds/Allergies   all medications and allergies reviewed  No Known Allergies    Objective   First Vitals:   Blood Pressure: 153/75 (02/27/21 0108)  Pulse: 97 (02/27/21 0108)  Temperature: (!) 97 3 °F (36 3 °C) (02/27/21 0108)  Temp Source: Oral (02/27/21 0108)  Respirations: 18 (02/27/21 0108)  Height: 5' 8" (172 7 cm) (02/27/21 0108)  Weight - Scale: 88 5 kg (195 lb) (02/27/21 0108)  SpO2: 97 % (02/27/21 0108)    Current Vitals:   Blood Pressure: 141/70 (02/27/21 0130)  Pulse: 84 (02/27/21 0130)  Temperature: (!) 97 3 °F (36 3 °C) (02/27/21 0108)  Temp Source: Oral (02/27/21 0108)  Respirations: 16 (02/27/21 0130)  Height: 5' 8" (172 7 cm) (02/27/21 0108)  Weight - Scale: 88 5 kg (195 lb) (02/27/21 0108)  SpO2: 97 % (02/27/21 0130)    No intake or output data in the 24 hours ending 02/27/21 0207    Invasive Devices     Peripheral Intravenous Line            Peripheral IV 02/27/21 Left Forearm less than 1 day                Physical Exam  Vitals signs and nursing note reviewed  Constitutional:       Appearance: Normal appearance  HENT:      Head: Normocephalic and atraumatic        Right Ear: External ear normal       Left Ear: External ear normal    Cardiovascular:      Rate and Rhythm: Normal rate and regular rhythm  Pulmonary:      Effort: Pulmonary effort is normal  No respiratory distress  Musculoskeletal: Normal range of motion  Skin:     General: Skin is warm and dry  Capillary Refill: Capillary refill takes less than 2 seconds  Neurological:      General: No focal deficit present  Mental Status: He is alert and oriented to person, place, and time  Psychiatric:         Mood and Affect: Mood normal          Behavior: Behavior normal          Thought Content:  Thought content normal          Judgment: Judgment normal            Medical Decision Makin  endocarditis    Recent Results (from the past 36 hour(s))   ECG 12 lead    Collection Time: 21 12:46 AM   Result Value Ref Range    Ventricular Rate 126 BPM    Atrial Rate 126 BPM    AZ Interval 138 ms    QRSD Interval 94 ms    QT Interval 316 ms    QTC Interval 457 ms    P Axis 70 degrees    QRS Axis 134 degrees    T Wave Axis 17 degrees   CBC and differential    Collection Time: 21  1:15 AM   Result Value Ref Range    WBC 18 94 (H) 4 31 - 10 16 Thousand/uL    RBC 5 02 3 88 - 5 62 Million/uL    Hemoglobin 15 2 12 0 - 17 0 g/dL    Hematocrit 43 8 36 5 - 49 3 %    MCV 87 82 - 98 fL    MCH 30 3 26 8 - 34 3 pg    MCHC 34 7 31 4 - 37 4 g/dL    RDW 12 0 11 6 - 15 1 %    MPV 9 6 8 9 - 12 7 fL    Platelets 436 961 - 463 Thousands/uL    nRBC 0 /100 WBCs    Neutrophils Relative 78 (H) 43 - 75 %    Immat GRANS % 1 0 - 2 %    Lymphocytes Relative 9 (L) 14 - 44 %    Monocytes Relative 12 4 - 12 %    Eosinophils Relative 0 0 - 6 %    Basophils Relative 0 0 - 1 %    Neutrophils Absolute 14 61 (H) 1 85 - 7 62 Thousands/µL    Immature Grans Absolute 0 09 0 00 - 0 20 Thousand/uL    Lymphocytes Absolute 1 78 0 60 - 4 47 Thousands/µL    Monocytes Absolute 2 33 (H) 0 17 - 1 22 Thousand/µL    Eosinophils Absolute 0 08 0 00 - 0 61 Thousand/µL    Basophils Absolute 0 05 0 00 - 0 10 Thousands/µL   Comprehensive metabolic panel    Collection Time: 02/26/21  1:15 AM   Result Value Ref Range    Sodium 135 (L) 136 - 145 mmol/L    Potassium 3 9 3 5 - 5 3 mmol/L    Chloride 102 100 - 108 mmol/L    CO2 27 21 - 32 mmol/L    ANION GAP 6 4 - 13 mmol/L    BUN 17 5 - 25 mg/dL    Creatinine 1 51 (H) 0 60 - 1 30 mg/dL    Glucose 125 65 - 140 mg/dL    Calcium 8 7 8 3 - 10 1 mg/dL    Corrected Calcium 9 6 8 3 - 10 1 mg/dL    AST 24 5 - 45 U/L    ALT 46 12 - 78 U/L    Alkaline Phosphatase 59 46 - 116 U/L    Total Protein 7 1 6 4 - 8 2 g/dL    Albumin 2 9 (L) 3 5 - 5 0 g/dL    Total Bilirubin 0 35 0 20 - 1 00 mg/dL    eGFR 62 ml/min/1 73sq m   Lactic acid    Collection Time: 02/26/21  1:15 AM   Result Value Ref Range    LACTIC ACID 1 9 0 5 - 2 0 mmol/L   Procalcitonin with AM Reflex    Collection Time: 02/26/21  1:15 AM   Result Value Ref Range    Procalcitonin 0 95 (H) <=0 25 ng/ml   Protime-INR    Collection Time: 02/26/21  1:15 AM   Result Value Ref Range    Protime 14 2 11 6 - 14 5 seconds    INR 1 10 0 84 - 1 19   APTT    Collection Time: 02/26/21  1:15 AM   Result Value Ref Range    PTT 29 23 - 37 seconds   C-reactive protein    Collection Time: 02/26/21  1:15 AM   Result Value Ref Range     0 (H) <3 0 mg/L   Sedimentation rate, automated    Collection Time: 02/26/21  1:15 AM   Result Value Ref Range    Sed Rate 62 (H) 0 - 14 mm/hour   Blood culture #1    Collection Time: 02/26/21  1:16 AM    Specimen: Hand, Left; Blood   Result Value Ref Range    Blood Culture Received in Microbiology Lab  Culture in Progress  Blood culture #2    Collection Time: 02/26/21  1:16 AM    Specimen: Arm, Left; Blood   Result Value Ref Range    Blood Culture Received in Microbiology Lab  Culture in Progress      Procalcitonin Reflex    Collection Time: 02/26/21  4:23 AM   Result Value Ref Range    Procalcitonin 0 75 (H) <=0 25 ng/ml   ECG 12 lead    Collection Time: 02/26/21  6:52 AM   Result Value Ref Range    Ventricular Rate 106 BPM    Atrial Rate 106 BPM    TN Interval 150 ms QRSD Interval 112 ms    QT Interval 346 ms    QTC Interval 459 ms    P [place] 29 degrees    QRS Axis 36 degrees    T Wave Axis 56 degrees   ECG 12 lead    Collection Time: 02/26/21  6:58 AM   Result Value Ref Range    Ventricular Rate 104 BPM    Atrial Rate 104 BPM    AR Interval 146 ms    QRSD Interval 114 ms    QT Interval 352 ms    QTC Interval 462 ms    P Axis 31 degrees    QRS Axis 37 degrees    T Wave Axis 62 degrees   Urinalysis with microscopic    Collection Time: 02/26/21 12:42 PM   Result Value Ref Range    Clarity, UA Clear     Color, UA Dk Yellow     Specific Gravity, UA 1 032 (H) 1 003 - 1 030    pH, UA 6 0 4 5, 5 0, 5 5, 6 0, 6 5, 7 0, 7 5, 8 0    Glucose, UA Negative Negative mg/dl    Ketones, UA Trace (A) Negative mg/dl    Blood, UA Negative Negative    Protein, UA Trace (A) Negative mg/dl    Nitrite, UA Negative Negative    Bilirubin, UA Negative Negative    Urobilinogen, UA 1 0 0 2, 1 0 E U /dl E U /dl    Leukocytes, UA Negative Negative    WBC, UA None Seen None Seen, 2-4 /hpf    RBC, UA None Seen None Seen, 2-4 /hpf    Hyaline Casts, UA 3-5 (A) None Seen /lpf    Bacteria, UA None Seen None Seen, Occasional /hpf    Epithelial Cells None Seen None Seen, Occasional /hpf     No orders to display         Portions of the record may have been created with voice recognition software  Occasional wrong word or "sound a like" substitutions may have occurred due to the inherent limitations of voice recognition software  Read the chart carefully and recognize, using context, where substitutions have occurred          Critical Care Time  Procedures

## 2021-02-27 NOTE — ASSESSMENT & PLAN NOTE
Hx of MSSA bacteremia and possible septic pulmonary emboli through 04/03/2020 (tx with 4 days of cefazolin IV while admitted at [facility], then one week of  daptomycin IV at infusion center through 03/17/2020, and Patient was transitioned to linezolid p o  For the last 2 weeks of his antibiotic course)     Patient has a history of IV drug abuse and presented to [facility] 2 days ago with fever chest pain and dyspnea  Was receiving IV antibiotics for suspected endocarditis but decided to leave AMA as he was unhappy with care and decided to come to [facility]     Per chart review, 1/2 blood cultures positive for Staph a susceptible to vancomycin  CTA C/A/P w/o acute abnormalities and echocardiogram at [facility] did not show any signs of embolism or septic emboli  EKG sinus tachycardia with no acute ST/T-wave changes  + leukocytosis at 18,000  , procalcitonin 0 75  · Repeat blood cultures once again 1/2 positive for GPC  · Repeat blood cultures drawn on 3/1  · Continue with IV Ancef D4  · Infectious disease consulted, appreciate recommendations  · Monitor fever curve white blood cell count    MAURA 3/2- EF 60 %, No RWMA, trace MR, mild TR, No vegetation or aneurysm  -will continue antibiotics  3/4-pt wanted to leave AMA yday  However now would like to stay for IV abx and Rehab post discharge    -pending repeat Cx

## 2021-02-27 NOTE — PLAN OF CARE
Problem: PAIN - ADULT  Goal: Verbalizes/displays adequate comfort level or baseline comfort level  Description: Interventions:  - Encourage patient to monitor pain and request assistance  - Assess pain using appropriate pain scale  - Administer analgesics based on type and severity of pain and evaluate response  - Implement non-pharmacological measures as appropriate and evaluate response  - Consider cultural and social influences on pain and pain management  - Notify physician/advanced practitioner if interventions unsuccessful or patient reports new pain  Outcome: Progressing     Problem: INFECTION - ADULT  Goal: Absence or prevention of progression during hospitalization  Description: INTERVENTIONS:  - Assess and monitor for signs and symptoms of infection  - Monitor lab/diagnostic results  - Monitor all insertion sites, i e  indwelling lines, tubes, and drains  - Monitor endotracheal if appropriate and nasal secretions for changes in amount and color  - [place] appropriate cooling/warming therapies per order  - Administer medications as ordered  - Instruct and encourage patient and family to use good hand hygiene technique  - Identify and instruct in appropriate isolation precautions for identified infection/condition  Outcome: Progressing     Problem: SAFETY ADULT  Goal: Patient will remain free of falls  Description: INTERVENTIONS:  - Assess patient frequently for physical needs  -  Identify cognitive and physical deficits and behaviors that affect risk of falls    -  [place] fall precautions as indicated by assessment   - Educate patient/family on patient safety including physical limitations  - Instruct patient to call for assistance with activity based on assessment  - Modify environment to reduce risk of injury  - Consider OT/PT consult to assist with strengthening/mobility  Outcome: Progressing  Goal: Maintain or return to baseline ADL function  Description: INTERVENTIONS:  -  Assess patient's ability to carry out ADLs; assess patient's baseline for ADL function and identify physical deficits which impact ability to perform ADLs (bathing, care of mouth/teeth, toileting, grooming, dressing, etc )  - Assess/evaluate cause of self-care deficits   - Assess range of motion  - Assess patient's mobility; develop plan if impaired  - Assess patient's need for assistive devices and provide as appropriate  - Encourage maximum independence but intervene and supervise when necessary  - Involve family in performance of ADLs  - Assess for home care needs following discharge   - Consider OT consult to assist with ADL evaluation and planning for discharge  - Provide patient education as appropriate  Outcome: Progressing  Goal: Maintain or return mobility status to optimal level  Description: INTERVENTIONS:  - Assess patient's baseline mobility status (ambulation, transfers, stairs, etc )    - Identify cognitive and physical deficits and behaviors that affect mobility  - Identify mobility aids required to assist with transfers and/or ambulation (gait belt, sit-to-stand, lift, walker, cane, etc )  - [place] fall precautions as indicated by assessment  - Record patient progress and toleration of activity level on Mobility SBAR; progress patient to next Phase/Stage  - Instruct patient to call for assistance with activity based on assessment  - Consider rehabilitation consult to assist with strengthening/weightbearing, etc   Outcome: Progressing     Problem: DISCHARGE PLANNING  Goal: Discharge to home or other facility with appropriate resources  Description: INTERVENTIONS:  - Identify barriers to discharge w/patient and caregiver  - Arrange for needed discharge resources and transportation as appropriate  - Identify discharge learning needs (meds, wound care, etc )  - Arrange for interpretive services to assist at discharge as needed  - Refer to Case Management Department for coordinating discharge planning if the patient needs post-hospital services based on physician/advanced practitioner order or complex needs related to functional status, cognitive ability, or social support system  Outcome: Progressing     Problem: Knowledge Deficit  Goal: Patient/family/caregiver demonstrates understanding of disease process, treatment plan, medications, and discharge instructions  Description: Complete learning assessment and assess knowledge base    Interventions:  - Provide teaching at level of understanding  - Provide teaching via preferred learning methods  Outcome: Progressing

## 2021-02-27 NOTE — ASSESSMENT & PLAN NOTE
creatinine on presentation was 1 51,baseline appears to be around 1  Etiology unknown possibly in setting of receiving multiple doses of Toradol versus poor p o  Intake   patient received 1 L isolate in the emergency department      · 3/4 Creatinine 1 38 today- likely prerenal, decrease PO intake  · Will give fluids  cc/hr  · Follow up BMP

## 2021-02-27 NOTE — ASSESSMENT & PLAN NOTE
History of IVDU in past including methamphetamine, heroin, and cocaine  Has not used in about a year, until he relapsed 10 days ago  Used 1 time  Reports being on Suboxone, per PDMP reviewed has not been filled since July 2020  Patient did receive 1 dose of Suboxone 8/2 mg b i d, well as IV Valium, fentanyl, and Toradol at [facility]  · UDS 2/27 + cocaine and methamphetamine, benzo and opiate  · Will contact [email or web address] patient reports receiving medications from an confirm dosing and recent refills  · Will consult toxicology as patient is interested restarting Suboxone versus Subutex  · For now continue with p r n  Symptom control with Tylenol, clonidine, Robaxin, and loperamide p r n   · 3/1: patient will start suboxone   · COWS score q 1 hr give 4/1mg until COWs <8  · Pt is refusing SUBOXONE  Per toxocology he can ONLY have suboxone therapy for withdrawals

## 2021-02-27 NOTE — ASSESSMENT & PLAN NOTE
Per chart review, chronic hepatitis C  Did see Dr Christian Hendrickson outpatient in November of 2020, and was started on [address] on 12/14/2020, but patient reports stopping treatment snf as he did not receive the second pack of medications in mail yet     · Will need close outpatient follow-up with GI for treatment

## 2021-02-28 PROBLEM — N17.9 AKI (ACUTE KIDNEY INJURY) (HCC): Status: RESOLVED | Noted: 2021-02-26 | Resolved: 2021-02-28

## 2021-02-28 PROBLEM — M54.6 ACUTE RIGHT-SIDED THORACIC BACK PAIN: Status: RESOLVED | Noted: 2021-02-26 | Resolved: 2021-02-28

## 2021-02-28 LAB
ANION GAP SERPL CALCULATED.3IONS-SCNC: 12 MMOL/L (ref 4–13)
BUN SERPL-MCNC: 6 MG/DL (ref 5–25)
CALCIUM SERPL-MCNC: 6.8 MG/DL (ref 8.3–10.1)
CHLORIDE SERPL-SCNC: 106 MMOL/L (ref 100–108)
CO2 SERPL-SCNC: 25 MMOL/L (ref 21–32)
CREAT SERPL-MCNC: 0.6 MG/DL (ref 0.6–1.3)
GFR SERPL CREATININE-BSD FRML MDRD: 136 ML/MIN/1.73SQ M
GLUCOSE SERPL-MCNC: 65 MG/DL (ref 65–140)
POTASSIUM SERPL-SCNC: 4.3 MMOL/L (ref 3.5–5.3)
SODIUM SERPL-SCNC: 143 MMOL/L (ref 136–145)

## 2021-02-28 PROCEDURE — 99232 SBSQ HOSP IP/OBS MODERATE 35: CPT | Performed by: INTERNAL MEDICINE

## 2021-02-28 PROCEDURE — 99232 SBSQ HOSP IP/OBS MODERATE 35: CPT | Performed by: HOSPITALIST

## 2021-02-28 PROCEDURE — 99449 NTRPROF PH1/NTRNET/EHR 31/>: CPT | Performed by: EMERGENCY MEDICINE

## 2021-02-28 PROCEDURE — 80048 BASIC METABOLIC PNL TOTAL CA: CPT | Performed by: STUDENT IN AN ORGANIZED HEALTH CARE EDUCATION/TRAINING PROGRAM

## 2021-02-28 RX ORDER — DIAZEPAM 5 MG/1
5 TABLET ORAL EVERY 12 HOURS PRN
Status: DISCONTINUED | OUTPATIENT
Start: 2021-02-28 | End: 2021-02-28

## 2021-02-28 RX ORDER — BUPRENORPHINE AND NALOXONE 8; 2 MG/1; MG/1
1 FILM, SOLUBLE BUCCAL; SUBLINGUAL DAILY
Status: DISCONTINUED | OUTPATIENT
Start: 2021-02-28 | End: 2021-03-03 | Stop reason: HOSPADM

## 2021-02-28 RX ADMIN — CEFAZOLIN SODIUM 2000 MG: 2 SOLUTION INTRAVENOUS at 01:47

## 2021-02-28 RX ADMIN — CLONIDINE HYDROCHLORIDE 0.2 MG: 0.2 TABLET ORAL at 09:31

## 2021-02-28 RX ADMIN — MELATONIN 3 MG: at 21:38

## 2021-02-28 RX ADMIN — SODIUM CHLORIDE, SODIUM GLUCONATE, SODIUM ACETATE, POTASSIUM CHLORIDE, MAGNESIUM CHLORIDE, SODIUM PHOSPHATE, DIBASIC, AND POTASSIUM PHOSPHATE 100 ML/HR: .53; .5; .37; .037; .03; .012; .00082 INJECTION, SOLUTION INTRAVENOUS at 01:52

## 2021-02-28 RX ADMIN — METHOCARBAMOL 500 MG: 500 TABLET ORAL at 17:38

## 2021-02-28 RX ADMIN — METHOCARBAMOL 500 MG: 500 TABLET ORAL at 09:45

## 2021-02-28 RX ADMIN — CEFAZOLIN SODIUM 2000 MG: 2 SOLUTION INTRAVENOUS at 09:31

## 2021-02-28 RX ADMIN — CEFAZOLIN SODIUM 2000 MG: 2 SOLUTION INTRAVENOUS at 17:30

## 2021-02-28 NOTE — CONSULTS
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 based on past history) with presumptive endocarditis  2  History of IV heroin abuse  3  History of hepatitis-C     Recommendations:    Discuss with the primary service  1  Check 2D echocardiogram  2  Await final identification of blood culture isolates with susceptibilities  Would check with [facility] to see if they have identification of their blood culture isolates  3  Pending above agree with cefazolin 2 g q 8 hours IV  4  If back pain recurs will need vertebral MRI   5  Dr Todd Arredondo has seen in the past and will resume follow-up on 03/01      HISTORY OF PRESENT ILLNESS:    Reason for Consult:  Staphylococcus aureus bacteremia  HPI: Tania Veliz is a 35y o  year old male with a known history of IV drug abuse (heroin, methamphetamine and cocaine) and MSSA bacteremia with presumptive endocarditis 3/20 was admitted initially on 02/26 with worsening right-sided thoracic back pain, TRAVIS and Staphylococcus aureus bacteremia  He had previously been [facility] 2 days prior but signed out AMA  Yesterday he signed out here again and returned less than an hour later for readmission  The patient admits to recent IV heroin use x1  He no longer has any back pain      Review of Systems fever, prior back pain,  A [id number] point system-based review of systems is otherwise negative      PAST MEDICAL HISTORY:  Past Medical History:   Diagnosis Date    Drug use     Seizures ([facility] [address] )      Past Surgical History:   Procedure Laterality Date    APPENDECTOMY         FAMILY HISTORY:  Non-contributory    SOCIAL HISTORY:  Social History   Single (currently undergoing divorce), 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  Social History     Substance and Sexual Activity   Alcohol Use Yes    Frequency: 2-4 times a month    Drinks per session: 3 or 4     Social History     Substance and Sexual Activity   Drug Use Yes    Types: Prescription, Heroin, Cocaine    Comment: Pt reports last use on wednesday     Social History     Tobacco Use   Smoking Status Current Some Day Smoker    Packs/day: 0 25    Types: Cigarettes   Smokeless Tobacco Never Used       ALLERGIES:  No Known Allergies    MEDICATIONS:  All current active medications have been reviewed        PHYSICAL EXAM:  Temp:  [97 3 °F (36 3 °C)-98 2 °F (36 8 °C)] 98 2 °F (36 8 °C)  HR:  [78-97] 88  Resp:  [16-18] 16  BP: (121-153)/(57-75) 121/73  SpO2:  [92 %-98 %] 96 %  Temp (24hrs), Av 8 °F (36 6 °C), Min:97 3 °F (36 3 °C), Max:98 2 °F (36 8 °C)  Current: Temperature: 98 2 °F (36 8 °C)    Intake/Output Summary (Last 24 hours) at 2021  Last data filed at 2021 1859  Gross per 24 hour   Intake 1120 ml   Output --   Net 1120 ml       General Appearance:  Appearing well with full beard, nontoxic, and in no distress, appears stated age   Head:  Normocephalic, without obvious abnormality, atraumatic   Eyes:  PERRL, conjunctiva pink and sclera anicteric, both eyes   Nose: Nares normal, mucosa normal, no drainage   Throat: Oropharynx moist without lesions; lips, mucosa, and tongue normal; teeth and gums normal   Neck: Supple, symmetrical, trachea midline, no adenopathy, no tenderness/mass/nodules   Back:   Symmetric, no curvature, ROM normal, no CVA tenderness   Lungs:   Clear to auscultation bilaterally, no audible wheezes, rhonchi and rales, respirations unlabored   Chest Wall:  No tenderness or deformity   Heart:  Regular rate and rhythm, S1, S2 normal, no murmur, rub or gallop   Abdomen:   Soft, non-tender, non-distended, positive bowel sounds, no masses, no organomegaly    No CVA tenderness   Extremities: Antecubital venous tracking areas Skin: Numerous tattoos  Neurologic: Alert and oriented times 3, extremity strength 5/5 and symmetric           Invasive Devices:   Peripheral IV 02/27/21 Left Forearm (Active)   Site Assessment Clean;Dry; Intact 02/27/21 1209   Dressing Type Transparent 02/27/21 1209   Line Status Flushed;Saline locked 02/27/21 1209   Dressing Status Clean;Dry; Intact 02/27/21 1209   Dressing Change Due 03/03/21 02/27/21 1209   Reason Not Rotated Not due 02/27/21 1209       LABS, IMAGING, & OTHER STUDIES:  Lab Results:      I have personally reviewed pertinent labs  Results from last 7 days   Lab Units 02/27/21  0535 02/26/21  0115   WBC Thousand/uL 10 36* 18 94*   HEMOGLOBIN g/dL 13 0 15 2   PLATELETS Thousands/uL 285 295     Results from last 7 days   Lab Units 02/27/21  0535 02/26/21  0115   SODIUM mmol/L 141 135*   POTASSIUM mmol/L 3 3* 3 9   CHLORIDE mmol/L 107 102   CO2 mmol/L 30 27   BUN mg/dL 11 17   CREATININE mg/dL 0 88 1 51*   EGFR ml/min/1 73sq m 116 62   CALCIUM mg/dL 8 4 8 7   AST U/L  --  24   ALT U/L  --  46   ALK PHOS U/L  --  59     Results from last 7 days   Lab Units 02/26/21  0116   BLOOD CULTURE  No Growth at 24 hrs  GRAM STAIN RESULT  Gram positive cocci in clusters*       Imaging Studies:   I have personally reviewed pertinent imaging study reports and images in PACS  EKG, Pathology, and Other Studies:   I have personally reviewed pertinent reports

## 2021-02-28 NOTE — PLAN OF CARE
Problem: PAIN - ADULT  Goal: Verbalizes/displays adequate comfort level or baseline comfort level  Description: Interventions:  - Encourage patient to monitor pain and request assistance  - Assess pain using appropriate pain scale  - Administer analgesics based on type and severity of pain and evaluate response  - Implement non-pharmacological measures as appropriate and evaluate response  - Consider cultural and social influences on pain and pain management  - Notify physician/advanced practitioner if interventions unsuccessful or patient reports new pain  Outcome: Progressing     Problem: INFECTION - ADULT  Goal: Absence or prevention of progression during hospitalization  Description: INTERVENTIONS:  - Assess and monitor for signs and symptoms of infection  - Monitor lab/diagnostic results  - Monitor all insertion sites, i e  indwelling lines, tubes, and drains  - Monitor endotracheal if appropriate and nasal secretions for changes in amount and color  - [place] appropriate cooling/warming therapies per order  - Administer medications as ordered  - Instruct and encourage patient and family to use good hand hygiene technique  - Identify and instruct in appropriate isolation precautions for identified infection/condition  Outcome: Progressing     Problem: SAFETY ADULT  Goal: Patient will remain free of falls  Description: INTERVENTIONS:  - Assess patient frequently for physical needs  -  Identify cognitive and physical deficits and behaviors that affect risk of falls    -  [place] fall precautions as indicated by assessment   - Educate patient/family on patient safety including physical limitations  - Instruct patient to call for assistance with activity based on assessment  - Modify environment to reduce risk of injury  - Consider OT/PT consult to assist with strengthening/mobility  Outcome: Progressing  Goal: Maintain or return to baseline ADL function  Description: INTERVENTIONS:  -  Assess patient's ability to carry out ADLs; assess patient's baseline for ADL function and identify physical deficits which impact ability to perform ADLs (bathing, care of mouth/teeth, toileting, grooming, dressing, etc )  - Assess/evaluate cause of self-care deficits   - Assess range of motion  - Assess patient's mobility; develop plan if impaired  - Assess patient's need for assistive devices and provide as appropriate  - Encourage maximum independence but intervene and supervise when necessary  - Involve family in performance of ADLs  - Assess for home care needs following discharge   - Consider OT consult to assist with ADL evaluation and planning for discharge  - Provide patient education as appropriate  Outcome: Progressing  Goal: Maintain or return mobility status to optimal level  Description: INTERVENTIONS:  - Assess patient's baseline mobility status (ambulation, transfers, stairs, etc )    - Identify cognitive and physical deficits and behaviors that affect mobility  - Identify mobility aids required to assist with transfers and/or ambulation (gait belt, sit-to-stand, lift, walker, cane, etc )  - [place] fall precautions as indicated by assessment  - Record patient progress and toleration of activity level on Mobility SBAR; progress patient to next Phase/Stage  - Instruct patient to call for assistance with activity based on assessment  - Consider rehabilitation consult to assist with strengthening/weightbearing, etc   Outcome: Progressing     Problem: DISCHARGE PLANNING  Goal: Discharge to home or other facility with appropriate resources  Description: INTERVENTIONS:  - Identify barriers to discharge w/patient and caregiver  - Arrange for needed discharge resources and transportation as appropriate  - Identify discharge learning needs (meds, wound care, etc )  - Arrange for interpretive services to assist at discharge as needed  - Refer to Case Management Department for coordinating discharge planning if the patient needs post-hospital services based on physician/advanced practitioner order or complex needs related to functional status, cognitive ability, or social support system  Outcome: Progressing     Problem: Knowledge Deficit  Goal: Patient/family/caregiver demonstrates understanding of disease process, treatment plan, medications, and discharge instructions  Description: Complete learning assessment and assess knowledge base    Interventions:  - Provide teaching at level of understanding  - Provide teaching via preferred learning methods  Outcome: Progressing     Problem: CARDIOVASCULAR - ADULT  Goal: Maintains optimal cardiac output and hemodynamic stability  Description: INTERVENTIONS:  - Monitor I/O, vital signs and rhythm  - Monitor for S/S and trends of decreased cardiac output  - Administer and titrate ordered vasoactive medications to optimize hemodynamic stability  - Assess quality of pulses, skin color and temperature  - Assess for signs of decreased coronary artery perfusion  - Instruct patient to report change in severity of symptoms  Outcome: Progressing  Goal: Absence of cardiac dysrhythmias or at baseline rhythm  Description: INTERVENTIONS:  - Continuous cardiac monitoring, vital signs, obtain 12 lead EKG if ordered  - Administer antiarrhythmic and heart rate control medications as ordered  - Monitor electrolytes and administer replacement therapy as ordered  Outcome: Progressing     Problem: HEMATOLOGIC - ADULT  Goal: Maintains hematologic stability  Description: INTERVENTIONS  - Assess for signs and symptoms of bleeding or hemorrhage  - Monitor labs  - Administer supportive blood products/factors as ordered and appropriate  Outcome: Progressing

## 2021-02-28 NOTE — CONSULTS
INTERPROFESSIONAL (PHONE) [facility]  Tania Veliz 35 y o  male MRN: [id number]  Unit/Bed#: -01 Encounter: [id number]      Reason for Consult / Principal Problem: LESA  Inpatient consult to Toxicology  Consult performed by: Daniela Sunshine DO  Consult ordered by: Patrice Queen DO        02/28/21      ASSESSMENT:  Opioid withdrawal     RECOMMENDATIONS:  Please continue supportive care and offer MAT with Suboxone  Last used heroin 10 days ago  May start Suboxone 8/2mg QD  Please reassess COWS one hour after first dose and reach out if >8  Please consult case management for coordination of future outpatient management  For further questions, please contact the medical  on call via [employer] or throughl the [employer]  Service or [facility]  Please see additional teaching note below:    Hx and PE limited by the dynamics of a phone consultation  I have not personally interviewed or evaluated the patient, but only advised based on the information provided to me  Primary provider is responsible for all clinical decisions  Pertinent history, physical exam and clinical findings and course discussed: Tania Veliz is a 35y o  year old male who presents with bacteremia and opioid withdrawal      Review of systems and physical exam not performed by me      Historical Information   Past Medical History:   Diagnosis Date    Drug use     Seizures ([facility] [address] )      Past Surgical History:   Procedure Laterality Date    APPENDECTOMY       Social History   Social History     Substance and Sexual Activity   Alcohol Use Yes    Frequency: 2-4 times a month    Drinks per session: 3 or 4     Social History     Substance and Sexual Activity   Drug Use Yes    Types: Prescription, Heroin, Cocaine    Comment: Pt reports last use on wednesday     Social History     Tobacco Use   Smoking Status Current Some Day Smoker    Packs/day: 0 25    Types: Cigarettes Smokeless Tobacco Never Used     Family History   Problem Relation Age of Onset    No Known Problems Mother     No Known Problems Father         Prior to Admission medications    Medication Sig Start Date End Date Taking? Authorizing Provider   ALPRAZolam Ros Grit) 0 5 mg tablet Take 0 5 mg by mouth 3 (three) times a day as needed for anxiety   Yes Historical Provider, MD       Current Facility-Administered Medications   Medication Dose Route Frequency    acetaminophen (TYLENOL) tablet 650 mg  650 mg Oral Q6H PRN    buprenorphine-naloxone (SUBOXONE) 8-2 mg per SL film 1 Film  1 Film Sublingual Daily    ceFAZolin (ANCEF) IVPB (premix in dextrose) 2,000 mg 50 mL  2,000 mg Intravenous Q8H    loperamide (IMODIUM) capsule 2 mg  2 mg Oral Q4H PRN    melatonin tablet 3 mg  3 mg Oral HS    methocarbamol (ROBAXIN) tablet 500 mg  500 mg Oral Q6H PRN    nicotine (NICODERM CQ) 7 mg/24hr TD 24 hr patch 1 patch  1 patch Transdermal Daily       No Known Allergies    Objective       Intake/Output Summary (Last 24 hours) at 2/28/2021 2254  Last data filed at 2/28/2021 1800  Gross per 24 hour   Intake 3128 34 ml   Output --   Net 3128 34 ml       Invasive Devices:   Peripheral IV 02/27/21 Left Forearm (Active)   Site Assessment WDL 02/28/21 0800   Dressing Type Transparent 02/28/21 0800   Line Status Flushed; Infusing 02/28/21 0800   Dressing Status Clean;Dry; Intact 02/28/21 0800   Dressing Change Due 03/03/21 02/28/21 0800   Reason Not Rotated Not due 02/28/21 0800       Vitals   Vitals:    02/27/21 2152 02/27/21 2153 02/28/21 0722 02/28/21 2207   BP: 140/77 140/77 141/77 144/76   TempSrc:       Pulse:  84     Resp:       Patient Position - Orthostatic VS:       Temp:  98 4 °F (36 9 °C) 98 1 °F (36 7 °C) 98 2 °F (36 8 °C)           Lab Results: I have personally reviewed pertinent reports        Labs:    Results from last 7 days   Lab Units 02/27/21  0535   WBC Thousand/uL 10 36*   HEMOGLOBIN g/dL 13 0   HEMATOCRIT % 38 6 PLATELETS Thousands/uL 285   NEUTROS PCT % 60   LYMPHS PCT % 23   MONOS PCT % 11      Results from last 7 days   Lab Units 02/28/21  0801  02/26/21  0115   SODIUM mmol/L 143   < > 135*   POTASSIUM mmol/L 4 3   < > 3 9   CHLORIDE mmol/L 106   < > 102   CO2 mmol/L 25   < > 27   BUN mg/dL 6   < > 17   CREATININE mg/dL 0 60   < > 1 51*   CALCIUM mg/dL 6 8*   < > 8 7   ALK PHOS U/L  --   --  59   ALT U/L  --   --  46   AST U/L  --   --  24    < > = values in this interval not displayed  Results from last 7 days   Lab Units 02/26/21  0115   INR  1 10   PTT seconds 29     Results from last 7 days   Lab Units 02/26/21 0115   LACTIC ACID mmol/L 1 9             Counseling / Coordination of Care  Total time spent today 36 minutes  This was a phone consultation

## 2021-02-28 NOTE — PLAN OF CARE
Problem: PAIN - ADULT  Goal: Verbalizes/displays adequate comfort level or baseline comfort level  Description: Interventions:  - Encourage patient to monitor pain and request assistance  - Assess pain using appropriate pain scale  - Administer analgesics based on type and severity of pain and evaluate response  - Implement non-pharmacological measures as appropriate and evaluate response  - Consider cultural and social influences on pain and pain management  - Notify physician/advanced practitioner if interventions unsuccessful or patient reports new pain  Outcome: Progressing     Problem: INFECTION - ADULT  Goal: Absence or prevention of progression during hospitalization  Description: INTERVENTIONS:  - Assess and monitor for signs and symptoms of infection  - Monitor lab/diagnostic results  - Monitor all insertion sites, i e  indwelling lines, tubes, and drains  - Monitor endotracheal if appropriate and nasal secretions for changes in amount and color  - [place] appropriate cooling/warming therapies per order  - Administer medications as ordered  - Instruct and encourage patient and family to use good hand hygiene technique  - Identify and instruct in appropriate isolation precautions for identified infection/condition  Outcome: Progressing     Problem: SAFETY ADULT  Goal: Patient will remain free of falls  Description: INTERVENTIONS:  - Assess patient frequently for physical needs  -  Identify cognitive and physical deficits and behaviors that affect risk of falls    -  [place] fall precautions as indicated by assessment   - Educate patient/family on patient safety including physical limitations  - Instruct patient to call for assistance with activity based on assessment  - Modify environment to reduce risk of injury  - Consider OT/PT consult to assist with strengthening/mobility  Outcome: Progressing  Goal: Maintain or return to baseline ADL function  Description: INTERVENTIONS:  -  Assess patient's ability to carry out ADLs; assess patient's baseline for ADL function and identify physical deficits which impact ability to perform ADLs (bathing, care of mouth/teeth, toileting, grooming, dressing, etc )  - Assess/evaluate cause of self-care deficits   - Assess range of motion  - Assess patient's mobility; develop plan if impaired  - Assess patient's need for assistive devices and provide as appropriate  - Encourage maximum independence but intervene and supervise when necessary  - Involve family in performance of ADLs  - Assess for home care needs following discharge   - Consider OT consult to assist with ADL evaluation and planning for discharge  - Provide patient education as appropriate  Outcome: Progressing  Goal: Maintain or return mobility status to optimal level  Description: INTERVENTIONS:  - Assess patient's baseline mobility status (ambulation, transfers, stairs, etc )    - Identify cognitive and physical deficits and behaviors that affect mobility  - Identify mobility aids required to assist with transfers and/or ambulation (gait belt, sit-to-stand, lift, walker, cane, etc )  - [place] fall precautions as indicated by assessment  - Record patient progress and toleration of activity level on Mobility SBAR; progress patient to next Phase/Stage  - Instruct patient to call for assistance with activity based on assessment  - Consider rehabilitation consult to assist with strengthening/weightbearing, etc   Outcome: Progressing     Problem: DISCHARGE PLANNING  Goal: Discharge to home or other facility with appropriate resources  Description: INTERVENTIONS:  - Identify barriers to discharge w/patient and caregiver  - Arrange for needed discharge resources and transportation as appropriate  - Identify discharge learning needs (meds, wound care, etc )  - Arrange for interpretive services to assist at discharge as needed  - Refer to Case Management Department for coordinating discharge planning if the patient needs post-hospital services based on physician/advanced practitioner order or complex needs related to functional status, cognitive ability, or social support system  Outcome: Progressing     Problem: Knowledge Deficit  Goal: Patient/family/caregiver demonstrates understanding of disease process, treatment plan, medications, and discharge instructions  Description: Complete learning assessment and assess knowledge base    Interventions:  - Provide teaching at level of understanding  - Provide teaching via preferred learning methods  Outcome: Progressing     Problem: CARDIOVASCULAR - ADULT  Goal: Maintains optimal cardiac output and hemodynamic stability  Description: INTERVENTIONS:  - Monitor I/O, vital signs and rhythm  - Monitor for S/S and trends of decreased cardiac output  - Administer and titrate ordered vasoactive medications to optimize hemodynamic stability  - Assess quality of pulses, skin color and temperature  - Assess for signs of decreased coronary artery perfusion  - Instruct patient to report change in severity of symptoms  Outcome: Progressing  Goal: Absence of cardiac dysrhythmias or at baseline rhythm  Description: INTERVENTIONS:  - Continuous cardiac monitoring, vital signs, obtain 12 lead EKG if ordered  - Administer antiarrhythmic and heart rate control medications as ordered  - Monitor electrolytes and administer replacement therapy as ordered  Outcome: Progressing     Problem: HEMATOLOGIC - ADULT  Goal: Maintains hematologic stability  Description: INTERVENTIONS  - Assess for signs and symptoms of bleeding or hemorrhage  - Monitor labs  - Administer supportive blood products/factors as ordered and appropriate  Outcome: Progressing

## 2021-02-28 NOTE — UTILIZATION REVIEW
Initial Clinical Review    Admission: Date/Time/Statement:   Admission Orders (From admission, onward)     Ordered        02/27/21 0136  Inpatient Admission  Once                   Orders Placed This Encounter   Procedures    Inpatient Admission     Standing Status:   Standing     Number of Occurrences:   1     Order Specific Question:   Level of Care     Answer:   Med Surg [16]     Order Specific Question:   Estimated length of stay     Answer:   More than 2 Midnights     Order Specific Question:   Certification     Answer:   I certify that inpatient services are medically necessary for this patient for a duration of greater than two midnights  See H&P and MD Progress Notes for additional information about the patient's course of treatment  ED Arrival Information     Expected Arrival Acuity Means of Arrival Escorted By Service Admission Type    - 2/27/2021 00:56 Urgent Walk-In Self General Medicine Urgent    Arrival Complaint    infection        Chief Complaint   Patient presents with    Medical Problem     Pt reports that he has a blood infection he has been dealing with for a couple of days  Pt reports that they think was from when he had a relapse a couple of weeks ago  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, thats whyh he left [place] earlier today  Back for treatment     Assessment/Plan: 35year old male to the ED from home with complaints of blood infection, endocarditis, had left earlier [facility], but returned for further treatment  He has severe chest pain worsening over a period of 4 days  +staph a bacteremia concerning for osteomyelitis  Elevated CRP and sed rate  H/O chronic hep C, IV drug use  Admitted to inpatient for staphylococcus aureus bacteremia  Initially hospitalized 2/24/21 at another facility but was not happy with the care, left AMA after being started on IV abx  WBCs elevated  Repeat blood cultures pending   Will likely need MAURA   IVDU in past including methamphetamine, heroin, and cocaine  Relapsed 10 days ago  Bilateral antecubital fossa track marks present, worse on right upper extremity  No open wounds or drainage noted, appeared to be well healing  Tenderness to palpation of right paraspinal muscle T 10/11 2/27 ID consult:  Recurrent staph aureus bacteremia with presumptive endocarditis  CHeck ECHO  Blood cultures pending  Continue with IV abx      2/28 UPdate:  Continue IV abx   COnsult toxicology for suboxone vs subutex  Repeat blood cultures once again 1/2 positive for GPC  Repeat blood cultures  He is tachycardic    Plan for MAURA  ED Triage Vitals [02/27/21 0108]   Temperature Pulse Respirations Blood Pressure SpO2   (!) 97 3 °F (36 3 °C) 97 18 153/75 97 %      Temp Source Heart Rate Source Patient Position - Orthostatic VS BP Location FiO2 (%)   Oral Monitor Lying Right arm --      Pain Score       Worst Possible Pain          Wt Readings from Last 1 Encounters:   02/27/21 88 5 kg (195 lb)     Additional Vital Signs:  Date/Time  Temp  Pulse  Resp  BP  MAP (mmHg)  SpO2  O2 Device  Patient Position - Orthostatic VS   02/28/21 0800  --  --  --  --  --  97 %  None (Room air)  --   02/28/21 07:22:42  98 1 °F (36 7 °C)  --  --  141/77  98  --  --  --   02/27/21 21:53:22  98 4 °F (36 9 °C)  84  --  140/77  98  96 %  --  --   02/27/21 21:52:39  --  --  --  140/77  98  --  --  --   02/27/21 2147  --  --  --  140/77  --  --  --  --   02/27/21 2000  --  --  --  --  --  96 %  None (Room air)  --   02/27/21 16:11:02  98 2 °F (36 8 °C)  --  --  121/73  89  --  --  --   02/27/21 1213  --  --  --  --  --  96 %  None (Room air)  --   02/27/21 12:03:48  --  88  --  128/74  92  92 %  --  --   02/27/21 0400  --  78  16  130/57  82  98 %  --  --   02/27/21 0330  --  84  16  138/60  87  96 %  None (Room air)  --   02/27/21 0230  --  86  18  128/59  85  93 %  None (Room air)  --   02/27/21 0200  --  86  18  139/63  91  97 %  None (Room air)  Lying   02/27/21 0130  --  84  16  141/70  99  97 % None (Room air)  --   02/27/21 0108  97 3 °F (36 3 °C)Abnormal   97  18  153/75  --  97 %  None (Room air)       Pertinent Labs/Diagnostic Test Results:   2/26 CXR: No acute cardiopulmonary disease      CTA C/A/P w/o acute abnormalities and echocardiogram at [facility] did not show any signs of embolism or septic emboli    2/26 EKG: Sinus tachycardia  Possible Left atrial enlargement  Borderline ECG      Results from last 7 days   Lab Units 02/27/21  0535 02/26/21  0115   WBC Thousand/uL 10 36* 18 94*   HEMOGLOBIN g/dL 13 0 15 2   HEMATOCRIT % 38 6 43 8   PLATELETS Thousands/uL 285 295   NEUTROS ABS Thousands/µL 6 31 14 61*         Results from last 7 days   Lab Units 02/28/21  0801 02/27/21  0535 02/26/21  0115   SODIUM mmol/L 143 141 135*   POTASSIUM mmol/L 4 3 3 3* 3 9   CHLORIDE mmol/L 106 107 102   CO2 mmol/L 25 30 27   ANION GAP mmol/L 12 4 6   BUN mg/dL 6 11 17   CREATININE mg/dL 0 60 0 88 1 51*   EGFR ml/min/1 73sq m 136 116 62   CALCIUM mg/dL 6 8* 8 4 8 7     Results from last 7 days   Lab Units 02/26/21  0115   AST U/L 24   ALT U/L 46   ALK PHOS U/L 59   TOTAL PROTEIN g/dL 7 1   ALBUMIN g/dL 2 9*   TOTAL BILIRUBIN mg/dL 0 35         Results from last 7 days   Lab Units 02/28/21  0801 02/27/21  0535 02/26/21  0115   GLUCOSE RANDOM mg/dL 65 139 125     Results from last 7 days   Lab Units 02/26/21  0115   PROTIME seconds 14 2   INR  1 10   PTT seconds 29       Results from last 7 days   Lab Units 02/26/21  0423 02/26/21  0115   PROCALCITONIN ng/ml 0 75* 0 95*     Results from last 7 days   Lab Units 02/26/21  0115   LACTIC ACID mmol/L 1 9       Results from last 7 days   Lab Units 02/26/21  0115   CRP mg/L 183 0*   SED RATE mm/hour 62*     Results from last 7 days   Lab Units 02/26/21  1242   CLARITY UA  Clear   COLOR UA  Dk Yellow   SPEC GRAV UA  1 032*   PH UA  6 0   GLUCOSE UA mg/dl Negative   KETONES UA mg/dl Trace*   BLOOD UA  Negative   PROTEIN UA mg/dl Trace*   NITRITE UA  Negative   BILIRUBIN UA Negative   UROBILINOGEN UA E U /dl 1 0   LEUKOCYTES UA  Negative   WBC UA /hpf None Seen   RBC UA /hpf None Seen   BACTERIA UA /hpf None Seen   EPITHELIAL CELLS WET PREP /hpf None Seen     Results from last 7 days   Lab Units 02/27/21  1447   AMPH/METH  Positive*   BARBITURATE UR  Negative   BENZODIAZEPINE UR  Positive*   COCAINE UR  Positive*   METHADONE URINE  Negative   OPIATE UR  Positive*   PCP UR  Negative   THC UR  Negative         Results from last 7 days   Lab Units 02/26/21  0116   BLOOD CULTURE  No Growth at 48 hrs     GRAM STAIN RESULT  Gram positive cocci in clusters*       ED Treatment:   Medication Administration from 02/27/2021 0056 to 02/27/2021 1146       Date/Time Order Dose Route Action     02/27/2021 0154 diazepam (VALIUM) tablet 5 mg 5 mg Oral Given     02/27/2021 0154 ketorolac (TORADOL) injection 15 mg 15 mg Intravenous Given     02/27/2021 0241 ceFAZolin (ANCEF) IVPB (premix in dextrose) 2,000 mg 50 mL 2,000 mg Intravenous New Bag     02/27/2021 0241 multi-electrolyte (PLASMALYTE-A/ISOLYTE-S PH 7 4) IV solution 100 mL/hr Intravenous New Bag     02/27/2021 0240 cloNIDine (CATAPRES) tablet 0 2 mg 0 2 mg Oral Given        Past Medical History:   Diagnosis Date    Drug use     Seizures (HCC)        Admitting Diagnosis: Infection [B99 9]  Chest pain [R07 9]  Fever [R50 9]  Staphylococcus aureus bacteremia [R78 81, B95 61]  Age/Sex: 35 y o  male  Admission Orders:  Scheduled Medications:  buprenorphine-naloxone, 1 Film, Sublingual, Daily  cefazolin, 2,000 mg, Intravenous, Q8H  melatonin, 3 mg, Oral, HS  nicotine, 1 patch, Transdermal, Daily      Continuous IV Infusions:     PRN Meds:  acetaminophen, 650 mg, Oral, Q6H PRN  loperamide, 2 mg, Oral, Q4H PRN  methocarbamol, 500 mg, Oral, Q6H PRN        IP CONSULT TO CASE MANAGEMENT  IP CONSULT TO INFECTIOUS DISEASES  IP CONSULT TO TOXICOLOGY    Network Utilization Review Department  ATTENTION: Please call with any questions or concerns to [phone] and carefully listen to the prompts so that you are directed to the right person  All voicemails are confidential   Bonnie Wright all requests for admission clinical reviews, approved or denied determinations and any other requests to dedicated fax number below belonging to the campus where the patient is receiving treatment   List of dedicated fax numbers for the Facilities:  [address] DENIALS (Administrative/Medical Necessity) [phone]   [address] (Maternity/NICU/Pediatrics) [phone]   [address] [place] [address] Dr Zen Kruger 3237 (  Amy Nath "Teresa" 103) [address] [place] [address] [address]anny [address] [place] [address] [phone]

## 2021-02-28 NOTE — PROGRESS NOTES
INTERNAL MEDICINE RESIDENCY PROGRESS NOTE     Name: Migel Mancuso   Age & Sex: 35 y o  male   MRN: [id number]  Unit/Bed#: -01   Encounter: [id number]  Team: SOD Team A    PATIENT INFORMATION     Name: Migel Mancuso   Age & Sex: 35 y o  male   MRN: [id number]  Hospital Stay Days: 1    ASSESSMENT/PLAN     Principal Problem:    Staphylococcus aureus bacteremia  Active Problems:    Opioid use disorder ([address] )    Hepatitis C      Hepatitis C  Assessment & Plan  Per chart review, chronic hepatitis C  Did see Dr Kelley Johnston outpatient in November of 2020, and was started on [address] on 12/14/2020, but patient reports stopping treatment care home as he did not receive the second pack of medications in mail yet  · Will need close outpatient follow-up with GI for treatment     Opioid use disorder [facility])  Assessment & Plan  History of IVDU in past including methamphetamine, heroin, and cocaine  Has not used in about a year, until he relapsed 10 days ago  Used 1 time  Reports being on Suboxone, per PDMP reviewed has not been filled since July 2020  Patient did receive 1 dose of Suboxone 8/2 mg b i d, well as IV Valium, fentanyl, and Toradol at [facility]  · UDS 2/27 + cocaine and methamphetamine, benzo and opiate  · Will contact [employer] Drive patient reports receiving medications from an confirm dosing and recent refills  · Will consult toxicology as patient is interested restarting Suboxone versus Subutex  · For now continue with p r n  Symptom control with Tylenol, clonidine, Robaxin, and loperamide p r n     * Staphylococcus aureus bacteremia  Assessment & Plan  Hx of MSSA bacteremia and possible septic pulmonary emboli through 04/03/2020 (tx with 4 days of cefazolin IV while admitted at [facility], then one week of  daptomycin IV at infusion center through 03/17/2020, and Patient was transitioned to linezolid p o  For the last 2 weeks of his antibiotic course)        Patient has a history of IV drug abuse and presented to [facility] 2 days ago with fever chest pain and dyspnea  Was receiving IV antibiotics for suspected endocarditis but decided to leave A as he was unhappy with care and decided to come to [facility]     Per chart review, 1/2 blood cultures positive for Staph a susceptible to vancomycin  CTA C/A/P w/o acute abnormalities and echocardiogram at [facility] did not show any signs of embolism or septic emboli  EKG sinus tachycardia with no acute ST/T-wave changes  + leukocytosis at 18,000  , procalcitonin 0 75  · Tentative plan for MAURA tomorrow  · Repeat blood cultures once again 1/2 positive for GPC  · Repeat blood cultures on 02/29  · Continue with IV Ancef  · Infectious disease consulted, appreciate recommendations  · Monitor fever curve white blood cell count  Acute right-sided thoracic back pain-resolved as of 2/28/2021  Assessment & Plan  Resolved  Noted to have four-day history of worsening right-sided thoracic back pain, sharp in nature  Appears to be paraspinal area T10-11  Noted  improvement with Robaxin and Valium received at [facility]  likely musculoskeletal  · , ESR 86  Benign exam, hold off on MRI at this time  · P r n  Pain management with Tylenol, Robaxin    TRAVIS (acute kidney injury) (HCC)-resolved as of 2/28/2021  Assessment & Plan  creatinine on presentation was 1 51,baseline appears to be around 1  Etiology unknown possibly in setting of receiving multiple doses of Toradol versus poor p o  Intake  patient received 1 L isolate in the emergency department  · Creatinine 0 88 today  · follow-up BMP and trend creatinine      Disposition:  IV antibiotics as highlighted above  Tentative plan for MAURA tomorrow  SUBJECTIVE     Patient seen and examined  No acute events overnight  States he is feeling well overall  Patient had denies any nausea, vomiting, diarrhea, constipation, chest pain, fevers or chills, shortness of breath    All other review of systems negative at this time   Patient states he is interested in starting Subutex or Suboxone while inpatient and is requesting toxicology consultation  Also would like to talk to case management for rehab and therapy options  OBJECTIVE     Vitals:    21 2152 21 2153 21 0722 21 0800   BP: 140/77 140/77 141/77    BP Location:       Pulse:  84     Resp:       Temp:  98 4 °F (36 9 °C) 98 1 °F (36 7 °C)    TempSrc:       SpO2:  96%  97%   Weight:       Height:          Temperature:   Temp (24hrs), Av 2 °F (36 8 °C), Min:98 1 °F (36 7 °C), Max:98 4 °F (36 9 °C)    Temperature: 98 1 °F (36 7 °C)  Intake & Output:  I/O        07 -  0700  07 -  0700  07 -  0700    P  O   120 1220    I V  (mL/kg)  1586 7 (17 9) 951 7 (10 8)    IV Piggyback 0 150 50    Total Intake(mL/kg) 0 (0) 1856 7 (21) 2221 7 (25 1)    Net 0 +1856 7 +2221 7               Weights:   IBW: 68 4 kg    Body mass index is 29 65 kg/m²  Weight (last 2 days)     Date/Time   Weight    21 0108   88 5 (195)            Physical Exam  Vitals signs reviewed  Constitutional:       General: He is not in acute distress  Appearance: He is well-developed  He is not diaphoretic  HENT:      Head: Normocephalic and atraumatic  Mouth/Throat:      Pharynx: No oropharyngeal exudate  Eyes:      General: No scleral icterus  Extraocular Movements: Extraocular movements intact  Conjunctiva/sclera: Conjunctivae normal    Neck:      Vascular: No JVD  Trachea: No tracheal deviation  Cardiovascular:      Rate and Rhythm: Regular rhythm  Tachycardia present  Heart sounds: No murmur  No friction rub  No gallop  Pulmonary:      Effort: Pulmonary effort is normal  No respiratory distress  Breath sounds: No stridor  No wheezing  Abdominal:      General: There is no distension  Palpations: Abdomen is soft  There is no mass  Tenderness: There is no abdominal tenderness   There is no right CVA tenderness or left CVA tenderness  Musculoskeletal: Normal range of motion  General: No tenderness  Right lower leg: No edema  Left lower leg: No edema  Skin:     General: Skin is warm and dry  Coloration: Skin is not pale  Findings: No erythema  Neurological:      Mental Status: He is alert and oriented to person, place, and time  Psychiatric:         Behavior: Behavior normal          Thought Content: Thought content normal        LABORATORY DATA     Labs: I have personally reviewed pertinent reports  Results from last 7 days   Lab Units 02/27/21  0535 02/26/21  0115   WBC Thousand/uL 10 36* 18 94*   HEMOGLOBIN g/dL 13 0 15 2   HEMATOCRIT % 38 6 43 8   PLATELETS Thousands/uL 285 295   NEUTROS PCT % 60 78*   MONOS PCT % 11 12      Results from last 7 days   Lab Units 02/28/21  0801 02/27/21  0535 02/26/21  0115   POTASSIUM mmol/L 4 3 3 3* 3 9   CHLORIDE mmol/L 106 107 102   CO2 mmol/L 25 30 27   BUN mg/dL 6 11 17   CREATININE mg/dL 0 60 0 88 1 51*   CALCIUM mg/dL 6 8* 8 4 8 7   ALK PHOS U/L  --   --  59   ALT U/L  --   --  46   AST U/L  --   --  24              Results from last 7 days   Lab Units 02/26/21  0115   INR  1 10   PTT seconds 29     Results from last 7 days   Lab Units 02/26/21  0115   LACTIC ACID mmol/L 1 9           IMAGING & DIAGNOSTIC TESTING     Radiology Results: I have personally reviewed pertinent reports  No results found  Other Diagnostic Testing: I have personally reviewed pertinent reports      ACTIVE MEDICATIONS     Current Facility-Administered Medications   Medication Dose Route Frequency    acetaminophen (TYLENOL) tablet 650 mg  650 mg Oral Q6H PRN    ceFAZolin (ANCEF) IVPB (premix in dextrose) 2,000 mg 50 mL  2,000 mg Intravenous Q8H    cloNIDine (CATAPRES) tablet 0 2 mg  0 2 mg Oral Q12H NATHANAEL    loperamide (IMODIUM) capsule 2 mg  2 mg Oral Q4H PRN    melatonin tablet 3 mg  3 mg Oral HS    methocarbamol (ROBAXIN) tablet 500 mg  500 mg Oral Q6H PRN    nicotine (NICODERM CQ) 7 mg/24hr TD 24 hr patch 1 patch  1 patch Transdermal Daily       VTE Pharmacologic Prophylaxis: Sequential compression device (Venodyne)   VTE Mechanical Prophylaxis: sequential compression device    Portions of the record may have been created with voice recognition software  Occasional wrong word or "sound a like" substitutions may have occurred due to the inherent limitations of voice recognition software    Read the chart carefully and recognize, using context, where substitutions have occurred   ==  Matt Loya, [facility]  Internal Medicine Residency PGY-2 normal (ped)...

## 2021-03-01 ENCOUNTER — ANESTHESIA (INPATIENT)
Dept: NON INVASIVE DIAGNOSTICS | Facility: HOSPITAL | Age: 31
DRG: 200 | End: 2021-03-01
Payer: COMMERCIAL

## 2021-03-01 LAB
BACTERIA BLD CULT: ABNORMAL
GRAM STN SPEC: ABNORMAL

## 2021-03-01 PROCEDURE — 99232 SBSQ HOSP IP/OBS MODERATE 35: CPT | Performed by: INTERNAL MEDICINE

## 2021-03-01 PROCEDURE — NC001 PR NO CHARGE: Performed by: INTERNAL MEDICINE

## 2021-03-01 RX ORDER — ALPRAZOLAM 0.5 MG/1
0.5 TABLET ORAL 4 TIMES DAILY PRN
Status: DISCONTINUED | OUTPATIENT
Start: 2021-03-01 | End: 2021-03-01

## 2021-03-01 RX ORDER — BUPRENORPHINE AND NALOXONE 2; .5 MG/1; MG/1
2 FILM, SOLUBLE BUCCAL; SUBLINGUAL ONCE
Status: DISCONTINUED | OUTPATIENT
Start: 2021-03-01 | End: 2021-03-02

## 2021-03-01 RX ADMIN — BUPRENORPHINE AND NALOXONE 1 FILM: 8; 2 FILM BUCCAL; SUBLINGUAL at 09:31

## 2021-03-01 RX ADMIN — METHOCARBAMOL 500 MG: 500 TABLET ORAL at 16:08

## 2021-03-01 RX ADMIN — CEFAZOLIN SODIUM 2000 MG: 2 SOLUTION INTRAVENOUS at 17:38

## 2021-03-01 RX ADMIN — CEFAZOLIN SODIUM 2000 MG: 2 SOLUTION INTRAVENOUS at 09:31

## 2021-03-01 RX ADMIN — MELATONIN 3 MG: at 21:59

## 2021-03-01 RX ADMIN — CEFAZOLIN SODIUM 2000 MG: 2 SOLUTION INTRAVENOUS at 02:33

## 2021-03-01 RX ADMIN — ACETAMINOPHEN 650 MG: 325 TABLET, FILM COATED ORAL at 16:08

## 2021-03-01 RX ADMIN — ALPRAZOLAM 0.5 MG: 0.5 TABLET ORAL at 11:47

## 2021-03-01 NOTE — UTILIZATION REVIEW
Notification of Inpatient Admission/Inpatient Authorization Request   This is a Notification of Inpatient Admission for [address]  Be advised that this patient was admitted to our facility under Inpatient Status  Contact Katie Skaggs at [phone] for additional admission information  [facility] DEPT  DEDICATED [phone]  Patient Name:   Olimpia Foreman   YOB: 1990       [address]   [address]:   [facility]  Tax ID: [id number]  NPI: [id number] Attending Provider/NPI:  Phone:  Address: Debbie Garcia [[id number]]  [phone]  Same as Facility   Place of Service Code: 24 Place of Service Name:  [address] [place] [address]   Start Date: 2/27/21 0136 Discharge Date & Time: No discharge date for patient encounter  Type of Admission: Inpatient Status Discharge Disposition (if discharged): Left against medical advice or discontinued care   Patient Diagnoses: Infection [B99 9]  Chest pain [R07 9]  Fever [R50 9]  Staphylococcus aureus bacteremia [R78 81, B95 61]     Orders: Admission Orders (From admission, onward)     Ordered        02/27/21 0136  Inpatient Admission  Once                    Assigned Utilization Review Contact: Katie Skaggs  Utilization ,   Network Utilization Review Department  Phone: [phone]; Fax [phone]   Email: [email or web address]   ATTENTION PAYERS: Please call the assigned Utilization  directly with any questions or concerns ALL voicemails in the department are confidential  Send all requests for admission clinical reviews, approved or denied determinations and any other requests to dedicated fax number belonging to the campus where the patient is receiving treatment

## 2021-03-01 NOTE — PROGRESS NOTES
INTERNAL MEDICINE RESIDENCY PROGRESS NOTE     Name: Good Garsia   Age & Sex: 35 y o  male   MRN: [id number]  Unit/Bed#: -01   Encounter: [id number]  Team: SOD Team A    PATIENT INFORMATION     Name: Good Garsia   Age & Sex: 35 y o  male   MRN: [id number]  Hospital Stay Days: 2    ASSESSMENT/PLAN     Principal Problem:    Staphylococcus aureus bacteremia  Active Problems:    Opioid use disorder ([address] )    Hepatitis C      Hepatitis C  Assessment & Plan  Per chart review, chronic hepatitis C  Did see Dr Bunny Barraza outpatient in November of 2020, and was started on [address] on 12/14/2020, but patient reports stopping treatment [facility] as he did not receive the second pack of medications in mail yet  · Will need close outpatient follow-up with GI for treatment     Opioid use disorder [facility])  Assessment & Plan  History of IVDU in past including methamphetamine, heroin, and cocaine  Has not used in about a year, until he relapsed 10 days ago  Used 1 time  Reports being on Suboxone, per PDMP reviewed has not been filled since July 2020  Patient did receive 1 dose of Suboxone 8/2 mg b i d, well as IV Valium, fentanyl, and Toradol at [facility]  · UDS 2/27 + cocaine and methamphetamine, benzo and opiate  · Will contact [address] patient reports receiving medications from an confirm dosing and recent refills  · Will consult toxicology as patient is interested restarting Suboxone versus Subutex  · For now continue with p r n  Symptom control with Tylenol, clonidine, Robaxin, and loperamide p r n   · 3/1: patient will start suboxone  · COWS score q 1 hr give 4/1mg until COWs <8  · Pt is refusing SUBOXONE  Per toxocology he can ONLY have suboxone therapy for withdrawals      * Staphylococcus aureus bacteremia  Assessment & Plan  Hx of MSSA bacteremia and possible septic pulmonary emboli through 04/03/2020 (tx with 4 days of cefazolin IV while admitted at [facility], then one week of  daptomycin IV at infusion center through 03/17/2020, and Patient was transitioned to linezolid p o  For the last 2 weeks of his antibiotic course)     Patient has a history of IV drug abuse and presented to [facility] 2 days ago with fever chest pain and dyspnea  Was receiving IV antibiotics for suspected endocarditis but decided to leave AMA as he was unhappy with care and decided to come to [facility]     Per chart review, 1/2 blood cultures positive for Staph a susceptible to vancomycin  CTA C/A/P w/o acute abnormalities and echocardiogram at [facility] did not show any signs of embolism or septic emboli  EKG sinus tachycardia with no acute ST/T-wave changes  + leukocytosis at 18,000  , procalcitonin 0 75  · Tentative plan for MAURA tomorrow- refused today  Likely tomorrow 3/2  · Repeat blood cultures once again 1/2 positive for GPC  · Repeat blood cultures drawn on 3/1  · Continue with IV Ancef  · Infectious disease consulted, appreciate recommendations  · Monitor fever curve white blood cell count  Acute right-sided thoracic back pain-resolved as of 2/28/2021  Assessment & Plan  Resolved  Noted to have four-day history of worsening right-sided thoracic back pain, sharp in nature  Appears to be paraspinal area T10-11  Noted  improvement with Robaxin and Valium received at [employer]  likely musculoskeletal  · , ESR 86  Benign exam, hold off on MRI at this time  · P r n  Pain management with Tylenol, Robaxin    TRAVIS (acute kidney injury) (HCC)-resolved as of 2/28/2021  Assessment & Plan  creatinine on presentation was 1 51,baseline appears to be around 1  Etiology unknown possibly in setting of receiving multiple doses of Toradol versus poor p o  Intake  patient received 1 L isolate in the emergency department  · Creatinine 0 88 today  · follow-up BMP and trend creatinine      Disposition: inpatient antibiotics      SUBJECTIVE     Patient seen and examined  No acute events overnight   The patient had no complaints this morning and denies fevers, chills, chest pain, SOB, N/V, diarrhea, any withdrawal symptoms, and all other ROS are negative  He has been NPO since midnight as he will undergo MAURA with cardiology later today  OBJECTIVE     Vitals:    21 0800 21 2207 21 0734   BP:   144/76 140/81   BP Location:       Pulse:       Resp:       Temp:   98 2 °F (36 8 °C) 98 4 °F (36 9 °C)   TempSrc:       SpO2: 97% 97%     Weight:       Height:          Temperature:   Temp (24hrs), Av 3 °F (36 8 °C), Min:98 2 °F (36 8 °C), Max:98 4 °F (36 9 °C)    Temperature: 98 4 °F (36 9 °C)  Intake & Output:  I/O        07 -  0700  07 -  0700  07 -  0700    P  O  120 1340     I V  (mL/kg) 1586 7 (17 9) 951 7 (10 8)     IV Piggyback 150 150     Total Intake(mL/kg) 1856 7 (21) 2441 7 (27 6)     Net +1856 7 +2441 7            Unmeasured Urine Occurrence  1 x         Weights:   IBW: 68 4 kg    Body mass index is 29 65 kg/m²  Weight (last 2 days)     Date/Time   Weight    21 0108   88 5 (195)            Physical Exam  Constitutional:       General: He is not in acute distress  Appearance: Normal appearance  Cardiovascular:      Rate and Rhythm: Normal rate and regular rhythm  Heart sounds: Murmur (tricuspid) present  Systolic murmur present  No friction rub  No gallop  Pulmonary:      Effort: Pulmonary effort is normal  No respiratory distress  Breath sounds: Normal breath sounds  No wheezing or rales  Abdominal:      General: Bowel sounds are normal  There is no distension  Palpations: Abdomen is soft  Tenderness: There is no abdominal tenderness  Neurological:      Mental Status: He is alert and oriented to person, place, and time  Psychiatric:         Mood and Affect: Affect is flat  LABORATORY DATA     Labs: I have personally reviewed pertinent reports    Results from last 7 days   Lab Units 21  0535 21  0115   WBC Thousand/uL 10 36* 18 94*   HEMOGLOBIN g/dL 13 0 15 2   HEMATOCRIT % 38 6 43 8   PLATELETS Thousands/uL 285 295   NEUTROS PCT % 60 78*   MONOS PCT % 11 12      Results from last 7 days   Lab Units 02/28/21  0801 02/27/21  0535 02/26/21  0115   POTASSIUM mmol/L 4 3 3 3* 3 9   CHLORIDE mmol/L 106 107 102   CO2 mmol/L 25 30 27   BUN mg/dL 6 11 17   CREATININE mg/dL 0 60 0 88 1 51*   CALCIUM mg/dL 6 8* 8 4 8 7   ALK PHOS U/L  --   --  59   ALT U/L  --   --  46   AST U/L  --   --  24              Results from last 7 days   Lab Units 02/26/21  0115   INR  1 10   PTT seconds 29     Results from last 7 days   Lab Units 02/26/21  0115   LACTIC ACID mmol/L 1 9           IMAGING & DIAGNOSTIC TESTING     Radiology Results: I have personally reviewed pertinent reports  No results found  Other Diagnostic Testing: I have personally reviewed pertinent reports  ACTIVE MEDICATIONS     Current Facility-Administered Medications   Medication Dose Route Frequency    acetaminophen (TYLENOL) tablet 650 mg  650 mg Oral Q6H PRN    buprenorphine-naloxone (SUBOXONE) 2-0 5 mg per SL film 2 Film  2 Film Sublingual Once    buprenorphine-naloxone (SUBOXONE) 8-2 mg per SL film 1 Film  1 Film Sublingual Daily    ceFAZolin (ANCEF) IVPB (premix in dextrose) 2,000 mg 50 mL  2,000 mg Intravenous Q8H    loperamide (IMODIUM) capsule 2 mg  2 mg Oral Q4H PRN    melatonin tablet 3 mg  3 mg Oral HS    methocarbamol (ROBAXIN) tablet 500 mg  500 mg Oral Q6H PRN    nicotine (NICODERM CQ) 7 mg/24hr TD 24 hr patch 1 patch  1 patch Transdermal Daily       VTE Pharmacologic Prophylaxis: Sequential compression device (Venodyne)   VTE Mechanical Prophylaxis: sequential compression device    Portions of the record may have been created with voice recognition software  Occasional wrong word or "sound a like" substitutions may have occurred due to the inherent limitations of voice recognition software    Read the chart carefully and recognize, using context, where substitutions have occurred   ==  Celeste Quinonez MD  MS3

## 2021-03-01 NOTE — PROGRESS NOTES
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  2  History of IV heroin abuse  3  History of hepatitis-C    Recommendations:  Patient is afebrile  Had multiple episodes of emesis  1  One of 2 blood cultures is positive for Gram-positive cocci in clusters which have now been identified as MSSA   2  Scheduled for MAURA which is now been postponed secondary to multiple episodes of emesis  3  Continue cefazolin 2 g q 8 hours IV    Antibiotics:  1  Cefazolin 2 g q 8 hours IV, day 3 Rx    Subjective:  He feels that he is going into withdrawal with multiple episodes of emesis  Denies fevers, chills, or sweats  Denies  diarrhea  Objective:  Vitals:  Temp:  [98 1 °F (36 7 °C)-98 4 °F (36 9 °C)] 98 1 °F (36 7 °C)  BP: (138-144)/(76-81) 138/78  SpO2:  [97 %] 97 %  Temp (24hrs), Av 2 °F (36 8 °C), Min:98 1 °F (36 7 °C), Max:98 4 °F (36 9 °C)  Current: Temperature: 98 1 °F (36 7 °C)    Physical Exam:     General Appearance:   Fatigued, nontoxic, no acute distress  Throat: Oropharynx moist without lesions  Lips, mucosa, and tongue normal   Neck: Supple, symmetrical, trachea midline, no adenopathy,  no tenderness/mass/nodules   Lungs:   Clear to auscultation bilaterally, no audible wheezes, rhonchi or rales; respirations unlabored   Heart:  Regular rate and rhythm, S1, S2 normal, no murmur, rub or gallop   Abdomen:   Soft, non-tender, non-distended, positive bowel sounds    No masses, no organomegaly    No CVA tenderness   Extremities: See below   Skin: Numerous tattoos, antecubital venous scarring greater on the right         Invasive Devices     Peripheral Intravenous Line            Peripheral IV 21 Left Forearm 2 days                Labs, Imaging, & Other studies:   All pertinent labs were personally reviewed  Results from last 7 days   Lab Units 02/27/21  0535 02/26/21  0115   WBC Thousand/uL 10 36* 18 94*   HEMOGLOBIN g/dL 13 0 15 2   PLATELETS Thousands/uL 285 295     Results from last 7 days   Lab Units 02/28/21  0801 02/27/21  0535 02/26/21  0115   SODIUM mmol/L 143 141 135*   POTASSIUM mmol/L 4 3 3 3* 3 9   CHLORIDE mmol/L 106 107 102   CO2 mmol/L 25 30 27   BUN mg/dL 6 11 17   CREATININE mg/dL 0 60 0 88 1 51*   EGFR ml/min/1 73sq m 136 116 62   CALCIUM mg/dL 6 8* 8 4 8 7   AST U/L  --   --  24   ALT U/L  --   --  46   ALK PHOS U/L  --   --  59     Results from last 7 days   Lab Units 02/26/21  0116   BLOOD CULTURE  Staphylococcus aureus*  No Growth at 72 hrs     GRAM STAIN RESULT  Gram positive cocci in clusters*

## 2021-03-01 NOTE — UTILIZATION REVIEW
Notification of Inpatient Admission/Inpatient Authorization Request   This is a Notification of Inpatient Admission for [address]  Be advised that this patient was admitted to our facility under Inpatient Status  Contact Cecilio Macias at [phone] for additional admission information  [facility]  DEDICATED [phone]  Patient Name:   Good Garsia   YOB: 1990       [address]   [address]:   [place] [address]  Tax ID: [id number]  NPI: [id number] Attending Provider/NPI:  Phone:  Address: Jaz García, Debbie Viera [[id number]]  [phone]  Same as [address] of Service Code: 24 Place of Service Name:  [address] [place] [address]   Start Date: 2/26/21 0529 Discharge Date & Time: 2/26/2021 11:50 PM    Type of Admission: Inpatient Status Discharge Disposition (if discharged): Left against medical advice or discontinued care   Patient Diagnoses: Dyspnea [R06 00]  Blood infection ([facility] ) [A41 9]  Staphylococcus aureus bacteremia [R78 81, B95 61]  Sepsis ([facility] ) [A41 9]  Opioid use disorder ([facility] ) [F11 99]     Orders: Admission Orders (From admission, onward)     Ordered        02/26/21 0529  Inpatient Admission  Once                    Assigned Utilization Review Contact: Cecilio Macias  Utilization ,   Network Utilization Review Department  Phone: [phone]; Fax [phone]   Email: [email or web address]   ATTENTION PAYERS: Please call the assigned Utilization  directly with any questions or concerns ALL voicemails in the department are confidential  Send all requests for admission clinical reviews, approved or denied determinations and any other requests to dedicated fax number belonging to the campus where the patient is receiving treatment

## 2021-03-01 NOTE — PROGRESS NOTES
Patient is resting in bed  Callbell within reach  No complains at this time  Will continue to monitor       Refused priscila Horn at the bedside, aware of refusal

## 2021-03-01 NOTE — PROGRESS NOTES
INTERNAL MEDICINE RESIDENCY PROGRESS NOTE     Name: Cb Naranjo   Age & Sex: 35 y o  male   MRN: [id number]  Unit/Bed#: -01   Encounter: [id number]  Team: SOD Team A    PATIENT INFORMATION     Name: Cb Naranjo   Age & Sex: 35 y o  male   MRN: [id number]  Hospital Stay Days: 2    ASSESSMENT/PLAN     Principal Problem:    Staphylococcus aureus bacteremia  Active Problems:    Opioid use disorder ([address] )    Hepatitis C      Hepatitis C  Assessment & Plan  Per chart review, chronic hepatitis C  Did see Dr Samantha Miller outpatient in November of 2020, and was started on [address] on 12/14/2020, but patient reports stopping treatment [facility] as he did not receive the second pack of medications in mail yet  · Will need close outpatient follow-up with GI for treatment     Opioid use disorder [facility])  Assessment & Plan  History of IVDU in past including methamphetamine, heroin, and cocaine  Has not used in about a year, until he relapsed 10 days ago  Used 1 time  Reports being on Suboxone, per PDMP reviewed has not been filled since July 2020  Patient did receive 1 dose of Suboxone 8/2 mg b i d, well as IV Valium, fentanyl, and Toradol at [facility]  · UDS 2/27 + cocaine and methamphetamine, benzo and opiate  · Will contact [address] patient reports receiving medications from an confirm dosing and recent refills  · Will consult toxicology as patient is interested restarting Suboxone versus Subutex  · For now continue with p r n  Symptom control with Tylenol, clonidine, Robaxin, and loperamide p r n   · 3/1: patient will start suboxone    * Staphylococcus aureus bacteremia  Assessment & Plan  Hx of MSSA bacteremia and possible septic pulmonary emboli through 04/03/2020 (tx with 4 days of cefazolin IV while admitted at [facility], then one week of  daptomycin IV at infusion center through 03/17/2020, and Patient was transitioned to linezolid p o  For the last 2 weeks of his antibiotic course)        Patient has a history of IV drug abuse and presented to [facility] 2 days ago with fever chest pain and dyspnea  Was receiving IV antibiotics for suspected endocarditis but decided to leave AMA as he was unhappy with care and decided to come to [facility]     Per chart review, 1/2 blood cultures positive for Staph a susceptible to vancomycin  CTA C/A/P w/o acute abnormalities and echocardiogram at [facility] did not show any signs of embolism or septic emboli  EKG sinus tachycardia with no acute ST/T-wave changes  + leukocytosis at 18,000  , procalcitonin 0 75  · Tentative plan for MAURA tomorrow- refused today  Likely tomorrow 3/2  · Repeat blood cultures once again 1/2 positive for GPC  · Repeat blood cultures drawn on 3/1  · Continue with IV Ancef  · Infectious disease consulted, appreciate recommendations  · Monitor fever curve white blood cell count  Acute right-sided thoracic back pain-resolved as of 2/28/2021  Assessment & Plan  Resolved  Noted to have four-day history of worsening right-sided thoracic back pain, sharp in nature  Appears to be paraspinal area T10-11  Noted  improvement with Robaxin and Valium received at [place]  likely musculoskeletal  · , ESR 86  Benign exam, hold off on MRI at this time  · P r n  Pain management with Tylenol, Robaxin    TRAVIS (acute kidney injury) (HCC)-resolved as of 2/28/2021  Assessment & Plan  creatinine on presentation was 1 51,baseline appears to be around 1  Etiology unknown possibly in setting of receiving multiple doses of Toradol versus poor p o  Intake  patient received 1 L isolate in the emergency department  · Creatinine 0 88 today  · follow-up BMP and trend creatinine      Disposition: Gram + bacteremia  / IV antibiotics     SUBJECTIVE     Patient seen and examined  No acute events overnight  The patient had no complaints this morning and denies fevers, chills, chest pain, SOB, N/V, diarrhea, any withdrawal symptoms, and all other ROS are negative   He has been NPO since midnight as he will undergo MAURA with cardiology later today however he refused later on in the day as he developed withdrawal symptoms including diaphoresis and chills  OBJECTIVE     Vitals:    21 0800 21 2207 21 0734   BP:   144/76 140/81   BP Location:       Pulse:       Resp:       Temp:   98 2 °F (36 8 °C) 98 4 °F (36 9 °C)   TempSrc:       SpO2: 97% 97%     Weight:       Height:          Temperature:   Temp (24hrs), Av 3 °F (36 8 °C), Min:98 2 °F (36 8 °C), Max:98 4 °F (36 9 °C)    Temperature: 98 4 °F (36 9 °C)  Intake & Output:  I/O        07 -  07 07 -  07 07 -  0700    P  O  120 1340     I V  (mL/kg) 1586 7 (17 9) 951 7 (10 8)     IV Piggyback 150 150 50    Total Intake(mL/kg) 1856 7 (21) 2441 7 (27 6) 50 (0 6)    Net +1856 7 +2441 7 +50           Unmeasured Urine Occurrence  1 x 1 x        Weights:   IBW: 68 4 kg    Body mass index is 29 65 kg/m²  Weight (last 2 days)     Date/Time   Weight    21 0108   88 5 (195)            Physical Exam  Constitutional:       General: He is not in acute distress  Appearance: He is ill-appearing  He is not toxic-appearing or diaphoretic  HENT:      Head: Normocephalic and atraumatic  Nose: No congestion or rhinorrhea  Cardiovascular:      Rate and Rhythm: Normal rate and regular rhythm  Heart sounds: Murmur (Right sternal border  holosystolic) present  Pulmonary:      Effort: Pulmonary effort is normal  No respiratory distress  Breath sounds: Normal breath sounds  No stridor  No wheezing, rhonchi or rales  Chest:      Chest wall: No tenderness  Abdominal:      General: Abdomen is flat  Bowel sounds are normal  There is no distension  Palpations: Abdomen is soft  There is no mass  Tenderness: There is no abdominal tenderness  There is no right CVA tenderness, left CVA tenderness, guarding or rebound  Hernia: No hernia is present  Musculoskeletal:      Right lower leg: No edema  Left lower leg: No edema  Skin:     General: Skin is warm  Findings: No lesion  Neurological:      General: No focal deficit present  Mental Status: He is alert and oriented to person, place, and time  Psychiatric:         Mood and Affect: Mood normal          Behavior: Behavior normal          Thought Content: Thought content normal          Judgment: Judgment normal        LABORATORY DATA     Labs: I have personally reviewed pertinent reports  Results from last 7 days   Lab Units 02/27/21  0535 02/26/21  0115   WBC Thousand/uL 10 36* 18 94*   HEMOGLOBIN g/dL 13 0 15 2   HEMATOCRIT % 38 6 43 8   PLATELETS Thousands/uL 285 295   NEUTROS PCT % 60 78*   MONOS PCT % 11 12      Results from last 7 days   Lab Units 02/28/21  0801 02/27/21  0535 02/26/21  0115   POTASSIUM mmol/L 4 3 3 3* 3 9   CHLORIDE mmol/L 106 107 102   CO2 mmol/L 25 30 27   BUN mg/dL 6 11 17   CREATININE mg/dL 0 60 0 88 1 51*   CALCIUM mg/dL 6 8* 8 4 8 7   ALK PHOS U/L  --   --  59   ALT U/L  --   --  46   AST U/L  --   --  24              Results from last 7 days   Lab Units 02/26/21  0115   INR  1 10   PTT seconds 29     Results from last 7 days   Lab Units 02/26/21  0115   LACTIC ACID mmol/L 1 9           IMAGING & DIAGNOSTIC TESTING     Radiology Results: I have personally reviewed pertinent reports  No results found  Other Diagnostic Testing: I have personally reviewed pertinent reports      ACTIVE MEDICATIONS     Current Facility-Administered Medications   Medication Dose Route Frequency    acetaminophen (TYLENOL) tablet 650 mg  650 mg Oral Q6H PRN    ALPRAZolam (XANAX) tablet 0 5 mg  0 5 mg Oral 4x Daily PRN    buprenorphine-naloxone (SUBOXONE) 8-2 mg per SL film 1 Film  1 Film Sublingual Daily    ceFAZolin (ANCEF) IVPB (premix in dextrose) 2,000 mg 50 mL  2,000 mg Intravenous Q8H    loperamide (IMODIUM) capsule 2 mg  2 mg Oral Q4H PRN    melatonin tablet 3 mg  3 mg Oral HS    methocarbamol (ROBAXIN) tablet 500 mg  500 mg Oral Q6H PRN    nicotine (NICODERM CQ) 7 mg/24hr TD 24 hr patch 1 patch  1 patch Transdermal Daily       VTE Pharmacologic Prophylaxis: Sequential compression device (Venodyne)   VTE Mechanical Prophylaxis: sequential compression device    Portions of the record may have been created with voice recognition software  Occasional wrong word or "sound a like" substitutions may have occurred due to the inherent limitations of voice recognition software    Read the chart carefully and recognize, using context, where substitutions have occurred   ==  Christiano Presley MD  [facility]  Internal Medicine Residency PGY-1

## 2021-03-01 NOTE — UTILIZATION REVIEW
Initial Clinical Review    Admission: Date/Time/Statement:   Admission Orders (From admission, onward)     Ordered        02/26/21 0529  Inpatient Admission  Once                   Orders Placed This Encounter   Procedures    Inpatient Admission     Standing Status:   Standing     Number of Occurrences:   1     Order Specific Question:   Level of Care     Answer:   Med Surg [16]     Order Specific Question:   Estimated length of stay     Answer:   More than 2 Midnights     Order Specific Question:   Certification     Answer:   I certify that inpatient services are medically necessary for this patient for a duration of greater than two midnights  See H&P and MD Progress Notes for additional information about the patient's course of treatment  ED Arrival Information     Expected Arrival Acuity Means of Arrival Escorted By Service Admission Type    - 2/25/2021 23:43 Urgent Walk-In Self General Medicine Urgent    Arrival Complaint    Infection        Chief Complaint   Patient presents with    Blood Infection     [facility]; pt left today and was unhapy with the care; was told he needed ~5 weeks IV abx and possibility of a port? Assessment/Plan: 36 y/o male with PMhx of IVDU and staph aureus bacteremia/endocarditis in March 2020 and Hep C who presents to the ED as a walk in with suspicion for endocarditis  Pt initially presented to [facility] with 2 days of fever, cp and dyspnea, admitted and worked up for endocarditis  Started on IV abx yesterday at [facility] but was unhappy with his care, left AMA and no presented to [facility] ED  CT chest and echo at [facility] neg for embolism or septic emboli  Blood cxs drawn still pending  Admits to last IV drug use 2 or 3 days ago  Reports Suboxone at home  Currently c/o of chest and upper back pain, sharp, non exertional   In ED today tachycardic, Cr 1 51, WBC 18 94  procal 0 95 and 0 75     Blood cxs drawn and started on IV vanco and cefepime  Admit inpatient to M/S/Tele unit with Staphylococcus aureus bacteremia, TRAVIS and acute R-sided thoracic back pain  Continue IV vanc  Consider MRI vs CT of thoracic spine for concern for osteo  ID consulted  MAURA  Symptom control with Tylenol, clonidine, Robaxin, and loperamide p r n  IVF's  Monitor BMP    2/27  --- at night pt decided he wanted to leave hospital AMA  14 days of p o  Linezolid 600 mg b i d  was ordered to the patient's pharmacy and the patient was discharged  ED Triage Vitals [02/26/21 0025]   Temperature Pulse Respirations Blood Pressure SpO2   98 5 °F (36 9 °C) (!) 127 18 129/69 98 %      Temp Source Heart Rate Source Patient Position - Orthostatic VS BP Location FiO2 (%)   Oral Monitor Sitting Left arm --      Pain Score       9          Wt Readings from Last 1 Encounters:   02/27/21 88 5 kg (195 lb)     Additional Vital Signs:   Date/Time  Temp  Pulse  Resp  BP  MAP (mmHg)  SpO2  O2 Device   02/26/21 1000  --  --  --  134/69  93  --  --   02/26/21 0709  --  95  18  126/57  --  96 %  None (Room air)   02/26/21 0500  --  94  18  114/56  79  96 %  --   02/26/21 0430  --  98  18  115/53  77  97 %  None (Room air)   02/26/21 0215  --  104  18  --  --  98 %  None (Room air)   02/26/21 0025  98 5 °F (36 9 °C)  127Abnormal   18  129/69  --  98 %  None (Room air)       Pertinent Labs/Diagnostic Test Results:   EKG 2/26 -- Sinus tachycardia  Possible Left atrial enlargement    CXR 2/26 -- No acute cardiopulmonary disease         Results from last 7 days   Lab Units 02/27/21  0535 02/26/21  0115   WBC Thousand/uL 10 36* 18 94*   HEMOGLOBIN g/dL 13 0 15 2   HEMATOCRIT % 38 6 43 8   PLATELETS Thousands/uL 285 295   NEUTROS ABS Thousands/µL 6 31 14 61*     Results from last 7 days   Lab Units 02/28/21  0801 02/27/21  0535 02/26/21  0115   SODIUM mmol/L 143 141 135*   POTASSIUM mmol/L 4 3 3 3* 3 9   CHLORIDE mmol/L 106 107 102   CO2 mmol/L 25 30 27   ANION GAP mmol/L 12 4 6   BUN mg/dL 6 11 17   CREATININE mg/dL 0 60 0 88 1 51*   EGFR ml/min/1 73sq m 136 116 62   CALCIUM mg/dL 6 8* 8 4 8 7     Results from last 7 days   Lab Units 02/26/21  0115   AST U/L 24   ALT U/L 46   ALK PHOS U/L 59   TOTAL PROTEIN g/dL 7 1   ALBUMIN g/dL 2 9*   TOTAL BILIRUBIN mg/dL 0 35     Results from last 7 days   Lab Units 02/28/21  0801 02/27/21  0535 02/26/21  0115   GLUCOSE RANDOM mg/dL 65 139 125     Results from last 7 days   Lab Units 02/26/21  0115   PROTIME seconds 14 2   INR  1 10   PTT seconds 29     Results from last 7 days   Lab Units 02/26/21  0423 02/26/21  0115   PROCALCITONIN ng/ml 0 75* 0 95*     Results from last 7 days   Lab Units 02/26/21  0115   LACTIC ACID mmol/L 1 9     Results from last 7 days   Lab Units 02/26/21  0115   CRP mg/L 183 0*   SED RATE mm/hour 62*     Results from last 7 days   Lab Units 02/26/21  1242   CLARITY UA  Clear   COLOR UA  Dk Yellow   SPEC GRAV UA  1 032*   PH UA  6 0   GLUCOSE UA mg/dl Negative   KETONES UA mg/dl Trace*   BLOOD UA  Negative   PROTEIN UA mg/dl Trace*   NITRITE UA  Negative   BILIRUBIN UA  Negative   UROBILINOGEN UA E U /dl 1 0   LEUKOCYTES UA  Negative   WBC UA /hpf None Seen   RBC UA /hpf None Seen   BACTERIA UA /hpf None Seen   EPITHELIAL CELLS WET PREP /hpf None Seen     Results from last 7 days   Lab Units 02/27/21  1447   AMPH/METH  Positive*   BARBITURATE UR  Negative   BENZODIAZEPINE UR  Positive*   COCAINE UR  Positive*   METHADONE URINE  Negative   OPIATE UR  Positive*   PCP UR  Negative   THC UR  Negative     Results from last 7 days   Lab Units 02/26/21  0116   BLOOD CULTURE  Staphylococcus aureus*  No Growth at 72 hrs     GRAM STAIN RESULT  Gram positive cocci in clusters*     ED Treatment:   Medication Administration from 02/25/2021 2341 to 02/26/2021 1555       Date/Time Order Dose Route Action     02/26/2021 0207 multi-electrolyte (ISOLYTE-S PH 7 4) bolus 1,000 mL 1,000 mL Intravenous New Bag     02/26/2021 0541 vancomycin (VANCOCIN) 1,250 mg in sodium chloride 0 9 % 250 mL IVPB 1,250 mg Intravenous New Bag     02/26/2021 0424 cefepime (MAXIPIME) 2 g/50 mL dextrose IVPB 2,000 mg Intravenous New Bag     02/26/2021 0804 multi-electrolyte (PLASMALYTE-A/ISOLYTE-S PH 7 4) IV solution 100 mL/hr Intravenous New Bag     02/26/2021 0805 acetaminophen (TYLENOL) tablet 650 mg 650 mg Oral Given     02/26/2021 1009 cloNIDine (CATAPRES) tablet 0 1 mg 0 1 mg Oral Given     02/26/2021 0959 methocarbamol (ROBAXIN) tablet 500 mg 500 mg Oral Given     02/26/2021 0959 vancomycin (VANCOCIN) IVPB (premix in dextrose) 500 mg 100 mL 500 mg Intravenous New Bag     Past Medical History:   Diagnosis Date    Drug use     Seizures ([facility] )      Present on Admission:   Hepatitis C   Staphylococcus aureus bacteremia   Opioid use disorder ([place] [address] )      Admitting Diagnosis: Dyspnea [R06 00]  Blood infection ([place] [address] ) [A41 9]  Staphylococcus aureus bacteremia [R78 81, B95 61]  Sepsis ([place] [address] ) [A41 9]  Opioid use disorder ([place] [address] ) [F11 99]  Age/Sex: 35 y o  male  Admission Orders:  Scheduled Medications:   Frequency   melatonin tablet 3 mg Once   melatonin tablet 3 mg Daily at bedtime   vancomycin (VANCOCIN) 1,250 mg in sodium chloride 0 9 % 250 mL IVPB Daily PRN   loperamide (IMODIUM) capsule 2 mg Every 4 hours PRN   cloNIDine (CATAPRES) tablet 0 1 mg Every 8 hours PRN 2/26 x1   diazepam (VALIUM) tablet 5 mg Every 6 hours PRN   methocarbamol (ROBAXIN) tablet 500 mg Every 6 hours PRN 2/26 x1   multi-electrolyte (PLASMALYTE-A/ISOLYTE-S PH 7 4) IV solution 100 ml/hr   acetaminophen (TYLENOL) tablet 650 mg Every 6 hours PRN   cefepime (MAXIPIME) 2 g/50 mL dextrose IVPB Every 12 hours           Network Utilization Review Department  ATTENTION: Please call with any questions or concerns to [phone] and carefully listen to the prompts so that you are directed to the right person   All voicemails are confidential   Jacob Saas all requests for admission clinical reviews, approved or denied determinations and any other requests to dedicated fax number below belonging to the campus where the patient is receiving treatment   List of dedicated fax numbers for the Facilities:  [address] [place] [address] DENIALS (Administrative/Medical Necessity) [phone]   [address] (Maternity/NICU/Pediatrics) [phone] [address] [place] [address] Dr Zen Kruger [id number] (  Amy Machado [facility]Teresa" [facility]) [address] [place] [address] Branden Hanny Pedro [address] [place]) [address] [place] [address] 951 [phone]

## 2021-03-01 NOTE — CASE MANAGEMENT
LOS 2 Days  Not a Bundle  Readmission from [facility] d/c 2/27  Unplanned Readmssion Risk is 16 GREEM    CM spoke with pt, AOx4  Pt was very limited in his conversation, did not provide a lot of information  He states he lives alone in a house, 2 mya to enter, no mya inside  IPTA  No DME  Denies hx of VNA, [address], IP rehab  Denies hx of MH or D&A-per medical record review, pt with extensive history of IVDU-heroin, cocaine, methamphetamine  Remained clean and sober for about a year, until relapse about 10 days prior to admission  Pt was receiving Suboxone at a suboxone clinic  Pt drives  No POA/LW  No PCP-not interested  Primary contact per Medical Record review is Moise [phone]    CM reviewed d/c planning process including the following: identifying help at home, patient preference for d/c planning needs, Discharge Lounge, [facility] Meds to Bed program, availability of treatment team to discuss questions or concerns patient and/or family may have regarding understanding medications and recognizing signs and symptoms once discharged  CM also encouraged patient to follow up with all recommended appointments after discharge  Patient advised of importance for patient and family to participate in managing patients medical well being

## 2021-03-01 NOTE — PLAN OF CARE
Problem: PAIN - ADULT  Goal: Verbalizes/displays adequate comfort level or baseline comfort level  Description: Interventions:  - Encourage patient to monitor pain and request assistance  - Assess pain using appropriate pain scale  - Administer analgesics based on type and severity of pain and evaluate response  - Implement non-pharmacological measures as appropriate and evaluate response  - Consider cultural and social influences on pain and pain management  - Notify physician/advanced practitioner if interventions unsuccessful or patient reports new pain  Outcome: Progressing     Problem: INFECTION - ADULT  Goal: Absence or prevention of progression during hospitalization  Description: INTERVENTIONS:  - Assess and monitor for signs and symptoms of infection  - Monitor lab/diagnostic results  - Monitor all insertion sites, i e  indwelling lines, tubes, and drains  - Monitor endotracheal if appropriate and nasal secretions for changes in amount and color  - [place] appropriate cooling/warming therapies per order  - Administer medications as ordered  - Instruct and encourage patient and family to use good hand hygiene technique  - Identify and instruct in appropriate isolation precautions for identified infection/condition  Outcome: Progressing     Problem: SAFETY ADULT  Goal: Patient will remain free of falls  Description: INTERVENTIONS:  - Assess patient frequently for physical needs  -  Identify cognitive and physical deficits and behaviors that affect risk of falls    -  [place] fall precautions as indicated by assessment   - Educate patient/family on patient safety including physical limitations  - Instruct patient to call for assistance with activity based on assessment  - Modify environment to reduce risk of injury  - Consider OT/PT consult to assist with strengthening/mobility  Outcome: Progressing  Goal: Maintain or return to baseline ADL function  Description: INTERVENTIONS:  -  Assess patient's ability to carry out ADLs; assess patient's baseline for ADL function and identify physical deficits which impact ability to perform ADLs (bathing, care of mouth/teeth, toileting, grooming, dressing, etc )  - Assess/evaluate cause of self-care deficits   - Assess range of motion  - Assess patient's mobility; develop plan if impaired  - Assess patient's need for assistive devices and provide as appropriate  - Encourage maximum independence but intervene and supervise when necessary  - Involve family in performance of ADLs  - Assess for home care needs following discharge   - Consider OT consult to assist with ADL evaluation and planning for discharge  - Provide patient education as appropriate  Outcome: Progressing  Goal: Maintain or return mobility status to optimal level  Description: INTERVENTIONS:  - Assess patient's baseline mobility status (ambulation, transfers, stairs, etc )    - Identify cognitive and physical deficits and behaviors that affect mobility  - Identify mobility aids required to assist with transfers and/or ambulation (gait belt, sit-to-stand, lift, walker, cane, etc )  - [place] fall precautions as indicated by assessment  - Record patient progress and toleration of activity level on Mobility SBAR; progress patient to next Phase/Stage  - Instruct patient to call for assistance with activity based on assessment  - Consider rehabilitation consult to assist with strengthening/weightbearing, etc   Outcome: Progressing     Problem: DISCHARGE PLANNING  Goal: Discharge to home or other facility with appropriate resources  Description: INTERVENTIONS:  - Identify barriers to discharge w/patient and caregiver  - Arrange for needed discharge resources and transportation as appropriate  - Identify discharge learning needs (meds, wound care, etc )  - Arrange for interpretive services to assist at discharge as needed  - Refer to Case Management Department for coordinating discharge planning if the patient needs post-hospital services based on physician/advanced practitioner order or complex needs related to functional status, cognitive ability, or social support system  Outcome: Progressing     Problem: Knowledge Deficit  Goal: Patient/family/caregiver demonstrates understanding of disease process, treatment plan, medications, and discharge instructions  Description: Complete learning assessment and assess knowledge base    Interventions:  - Provide teaching at level of understanding  - Provide teaching via preferred learning methods  Outcome: Progressing     Problem: CARDIOVASCULAR - ADULT  Goal: Maintains optimal cardiac output and hemodynamic stability  Description: INTERVENTIONS:  - Monitor I/O, vital signs and rhythm  - Monitor for S/S and trends of decreased cardiac output  - Administer and titrate ordered vasoactive medications to optimize hemodynamic stability  - Assess quality of pulses, skin color and temperature  - Assess for signs of decreased coronary artery perfusion  - Instruct patient to report change in severity of symptoms  Outcome: Progressing  Goal: Absence of cardiac dysrhythmias or at baseline rhythm  Description: INTERVENTIONS:  - Continuous cardiac monitoring, vital signs, obtain 12 lead EKG if ordered  - Administer antiarrhythmic and heart rate control medications as ordered  - Monitor electrolytes and administer replacement therapy as ordered  Outcome: Progressing     Problem: HEMATOLOGIC - ADULT  Goal: Maintains hematologic stability  Description: INTERVENTIONS  - Assess for signs and symptoms of bleeding or hemorrhage  - Monitor labs  - Administer supportive blood products/factors as ordered and appropriate  Outcome: Progressing

## 2021-03-01 NOTE — QUICK NOTE
Patient declined transport to come to heart station for MAURA  Discussed with patient bedside  He states he feels he is going through withdrawals this AM and had multiple episodes of emesis  Cancel MAURA for today  Re-evaluation for MAURA this week pending clinical course       Jennie Goldman MD  Cardiology Fellow

## 2021-03-02 ENCOUNTER — APPOINTMENT (INPATIENT)
Dept: RADIOLOGY | Facility: HOSPITAL | Age: 31
DRG: 200 | End: 2021-03-02
Payer: COMMERCIAL

## 2021-03-02 ENCOUNTER — ANESTHESIA (INPATIENT)
Dept: NON INVASIVE DIAGNOSTICS | Facility: HOSPITAL | Age: 31
DRG: 200 | End: 2021-03-02
Payer: COMMERCIAL

## 2021-03-02 PROBLEM — F17.200 SMOKING: Status: ACTIVE | Noted: 2021-03-02

## 2021-03-02 PROBLEM — IMO0001 SMOKING: Status: ACTIVE | Noted: 2021-03-02

## 2021-03-02 LAB
ANION GAP SERPL CALCULATED.3IONS-SCNC: 3 MMOL/L (ref 4–13)
BASOPHILS # BLD AUTO: 0.08 THOUSANDS/ΜL (ref 0–0.1)
BASOPHILS NFR BLD AUTO: 1 % (ref 0–1)
BUN SERPL-MCNC: 20 MG/DL (ref 5–25)
CALCIUM SERPL-MCNC: 9.1 MG/DL (ref 8.3–10.1)
CHLORIDE SERPL-SCNC: 103 MMOL/L (ref 100–108)
CO2 SERPL-SCNC: 30 MMOL/L (ref 21–32)
CREAT SERPL-MCNC: 1.38 MG/DL (ref 0.6–1.3)
EOSINOPHIL # BLD AUTO: 0.13 THOUSAND/ΜL (ref 0–0.61)
EOSINOPHIL NFR BLD AUTO: 1 % (ref 0–6)
ERYTHROCYTE [DISTWIDTH] IN BLOOD BY AUTOMATED COUNT: 11.4 % (ref 11.6–15.1)
GFR SERPL CREATININE-BSD FRML MDRD: 69 ML/MIN/1.73SQ M
GLUCOSE SERPL-MCNC: 113 MG/DL (ref 65–140)
HCT VFR BLD AUTO: 50.2 % (ref 36.5–49.3)
HGB BLD-MCNC: 17.6 G/DL (ref 12–17)
IMM GRANULOCYTES # BLD AUTO: 0.16 THOUSAND/UL (ref 0–0.2)
IMM GRANULOCYTES NFR BLD AUTO: 1 % (ref 0–2)
LYMPHOCYTES # BLD AUTO: 2.28 THOUSANDS/ΜL (ref 0.6–4.47)
LYMPHOCYTES NFR BLD AUTO: 14 % (ref 14–44)
MCH RBC QN AUTO: 29.8 PG (ref 26.8–34.3)
MCHC RBC AUTO-ENTMCNC: 35.1 G/DL (ref 31.4–37.4)
MCV RBC AUTO: 85 FL (ref 82–98)
MONOCYTES # BLD AUTO: 1.16 THOUSAND/ΜL (ref 0.17–1.22)
MONOCYTES NFR BLD AUTO: 7 % (ref 4–12)
NEUTROPHILS # BLD AUTO: 12.52 THOUSANDS/ΜL (ref 1.85–7.62)
NEUTS SEG NFR BLD AUTO: 76 % (ref 43–75)
NRBC BLD AUTO-RTO: 0 /100 WBCS
PLATELET # BLD AUTO: 469 THOUSANDS/UL (ref 149–390)
PMV BLD AUTO: 8.7 FL (ref 8.9–12.7)
POTASSIUM SERPL-SCNC: 4.3 MMOL/L (ref 3.5–5.3)
RBC # BLD AUTO: 5.91 MILLION/UL (ref 3.88–5.62)
SODIUM SERPL-SCNC: 136 MMOL/L (ref 136–145)
WBC # BLD AUTO: 16.33 THOUSAND/UL (ref 4.31–10.16)

## 2021-03-02 PROCEDURE — 93320 DOPPLER ECHO COMPLETE: CPT | Performed by: INTERNAL MEDICINE

## 2021-03-02 PROCEDURE — 71045 X-RAY EXAM CHEST 1 VIEW: CPT

## 2021-03-02 PROCEDURE — 99232 SBSQ HOSP IP/OBS MODERATE 35: CPT | Performed by: INTERNAL MEDICINE

## 2021-03-02 PROCEDURE — 93312 ECHO TRANSESOPHAGEAL: CPT | Performed by: INTERNAL MEDICINE

## 2021-03-02 PROCEDURE — 80048 BASIC METABOLIC PNL TOTAL CA: CPT | Performed by: STUDENT IN AN ORGANIZED HEALTH CARE EDUCATION/TRAINING PROGRAM

## 2021-03-02 PROCEDURE — 93312 ECHO TRANSESOPHAGEAL: CPT

## 2021-03-02 PROCEDURE — 85025 COMPLETE CBC W/AUTO DIFF WBC: CPT | Performed by: STUDENT IN AN ORGANIZED HEALTH CARE EDUCATION/TRAINING PROGRAM

## 2021-03-02 PROCEDURE — B24BZZ4 ULTRASONOGRAPHY OF HEART WITH AORTA, TRANSESOPHAGEAL: ICD-10-PCS | Performed by: INTERNAL MEDICINE

## 2021-03-02 PROCEDURE — 93325 DOPPLER ECHO COLOR FLOW MAPG: CPT | Performed by: INTERNAL MEDICINE

## 2021-03-02 PROCEDURE — 87040 BLOOD CULTURE FOR BACTERIA: CPT | Performed by: STUDENT IN AN ORGANIZED HEALTH CARE EDUCATION/TRAINING PROGRAM

## 2021-03-02 RX ORDER — CEPHALEXIN 500 MG/1
500 CAPSULE ORAL EVERY 6 HOURS SCHEDULED
Qty: 40 CAPSULE | Refills: 0 | Status: SHIPPED | OUTPATIENT
Start: 2021-03-02 | End: 2021-03-12

## 2021-03-02 RX ORDER — GLYCOPYRROLATE 0.2 MG/ML
INJECTION INTRAMUSCULAR; INTRAVENOUS AS NEEDED
Status: DISCONTINUED | OUTPATIENT
Start: 2021-03-02 | End: 2021-03-02

## 2021-03-02 RX ORDER — PROPOFOL 10 MG/ML
INJECTION, EMULSION INTRAVENOUS AS NEEDED
Status: DISCONTINUED | OUTPATIENT
Start: 2021-03-02 | End: 2021-03-02

## 2021-03-02 RX ORDER — KETAMINE HYDROCHLORIDE 50 MG/ML
INJECTION, SOLUTION, CONCENTRATE INTRAMUSCULAR; INTRAVENOUS AS NEEDED
Status: DISCONTINUED | OUTPATIENT
Start: 2021-03-02 | End: 2021-03-02

## 2021-03-02 RX ORDER — MIDAZOLAM HYDROCHLORIDE 2 MG/2ML
INJECTION, SOLUTION INTRAMUSCULAR; INTRAVENOUS AS NEEDED
Status: DISCONTINUED | OUTPATIENT
Start: 2021-03-02 | End: 2021-03-02

## 2021-03-02 RX ORDER — SODIUM CHLORIDE 9 MG/ML
INJECTION, SOLUTION INTRAVENOUS CONTINUOUS PRN
Status: DISCONTINUED | OUTPATIENT
Start: 2021-03-02 | End: 2021-03-02

## 2021-03-02 RX ADMIN — SODIUM CHLORIDE: 9 INJECTION, SOLUTION INTRAVENOUS at 10:48

## 2021-03-02 RX ADMIN — MELATONIN 3 MG: at 21:55

## 2021-03-02 RX ADMIN — PROPOFOL 200 MG: 10 INJECTION, EMULSION INTRAVENOUS at 11:15

## 2021-03-02 RX ADMIN — BUPRENORPHINE AND NALOXONE 0.5 FILM: 8; 2 FILM BUCCAL; SUBLINGUAL at 08:45

## 2021-03-02 RX ADMIN — PROPOFOL 50 MG: 10 INJECTION, EMULSION INTRAVENOUS at 11:16

## 2021-03-02 RX ADMIN — CEFAZOLIN SODIUM 2000 MG: 2 SOLUTION INTRAVENOUS at 02:12

## 2021-03-02 RX ADMIN — PROPOFOL 50 MG: 10 INJECTION, EMULSION INTRAVENOUS at 11:23

## 2021-03-02 RX ADMIN — PROPOFOL 50 MG: 10 INJECTION, EMULSION INTRAVENOUS at 11:17

## 2021-03-02 RX ADMIN — PROPOFOL 50 MG: 10 INJECTION, EMULSION INTRAVENOUS at 11:19

## 2021-03-02 RX ADMIN — CEFAZOLIN SODIUM 2000 MG: 2 SOLUTION INTRAVENOUS at 09:47

## 2021-03-02 RX ADMIN — KETAMINE HYDROCHLORIDE 10 MG: 50 INJECTION, SOLUTION INTRAMUSCULAR; INTRAVENOUS at 11:20

## 2021-03-02 RX ADMIN — LIDOCAINE HYDROCHLORIDE 100 MG: 20 INJECTION, SOLUTION INTRAVENOUS at 11:15

## 2021-03-02 RX ADMIN — CEFAZOLIN SODIUM 2000 MG: 2 SOLUTION INTRAVENOUS at 18:02

## 2021-03-02 RX ADMIN — GLYCOPYRROLATE 0.2 MG: 0.2 INJECTION, SOLUTION INTRAMUSCULAR; INTRAVENOUS at 11:16

## 2021-03-02 RX ADMIN — PROPOFOL 50 MG: 10 INJECTION, EMULSION INTRAVENOUS at 11:25

## 2021-03-02 RX ADMIN — KETAMINE HYDROCHLORIDE 20 MG: 50 INJECTION, SOLUTION INTRAMUSCULAR; INTRAVENOUS at 11:15

## 2021-03-02 RX ADMIN — PROPOFOL 50 MG: 10 INJECTION, EMULSION INTRAVENOUS at 11:21

## 2021-03-02 RX ADMIN — PROPOFOL 50 MG: 10 INJECTION, EMULSION INTRAVENOUS at 11:18

## 2021-03-02 RX ADMIN — PROPOFOL 50 MG: 10 INJECTION, EMULSION INTRAVENOUS at 11:20

## 2021-03-02 RX ADMIN — MIDAZOLAM 2 MG: 1 INJECTION INTRAMUSCULAR; INTRAVENOUS at 11:11

## 2021-03-02 RX ADMIN — LOPERAMIDE HYDROCHLORIDE 2 MG: 2 CAPSULE ORAL at 18:13

## 2021-03-02 NOTE — ANESTHESIA PREPROCEDURE EVALUATION
Procedure:  MAURA    Relevant Problems   GI/HEPATIC   (+) Hepatitis   (+) Hepatitis C      NEURO/PSYCH   (+) History of anabolic steroid use      PULMONARY   (+) Smoking      Other   (+) Opioid use disorder (HCC)      Hx IVDA, on suboxone-took this AM  Had MAURA 1 year ago for same issue and pt says procedure was stopped bc HR kept dropping  cx yesterday bc of emesis a/w withdrawal sx  NPO verified    Physical Exam    Airway  Comment: fitzpatrick  Mallampati score: II  TM Distance: >3 FB  Neck ROM: full     Dental   Comment: Denies loose/chipped/missing, No notable dental hx     Cardiovascular      Pulmonary      Other Findings        Anesthesia Plan  ASA Score- 3     Anesthesia Type- IV sedation with anesthesia with ASA Monitors  Additional Monitors:   Airway Plan:           Plan Factors-    Chart reviewed  Induction- intravenous  Postoperative Plan-     Informed Consent- Anesthetic plan and risks discussed with patient

## 2021-03-02 NOTE — PROGRESS NOTES
POST PROCEDURE ANESTHESIA NOTE:    Pt arousable, euvolemic and stable post procedure; SV non obstructed  Post procedure VS below:  /52    RR 24  O2 sat 96% on RA    Waiting with RN for transfer back to [facility] surg

## 2021-03-02 NOTE — PROGRESS NOTES
INTERNAL MEDICINE RESIDENCY PROGRESS NOTE     Name: Kamran Velez   Age & Sex: 35 y o  male   MRN: [id number]  Unit/Bed#: -01   Encounter: [id number]  Team: SOD Team A    PATIENT INFORMATION     Name: Kamran Velez   Age & Sex: 35 y o  male   MRN: [id number]  Hospital Stay Days: 3    ASSESSMENT/PLAN     Principal Problem:    Staphylococcus aureus bacteremia  Active Problems:    Opioid use disorder ([address] )    Hepatitis C    Nicotine dependence    Smoking      Hepatitis C  Assessment & Plan  Per chart review, chronic hepatitis C  Did see Dr Sarah Hylton outpatient in November of 2020, and was started on [address] on 12/14/2020, but patient reports stopping treatment snf as he did not receive the second pack of medications in mail yet  · Will need close outpatient follow-up with GI for treatment     Opioid use disorder [facility])  Assessment & Plan  History of IVDU in past including methamphetamine, heroin, and cocaine  Has not used in about a year, until he relapsed 10 days ago  Used 1 time  Reports being on Suboxone, per PDMP reviewed has not been filled since July 2020  Patient did receive 1 dose of Suboxone 8/2 mg b i d, well as IV Valium, fentanyl, and Toradol at [facility]  · UDS 2/27 + cocaine and methamphetamine, benzo and opiate  · Will contact [address] patient reports receiving medications from an confirm dosing and recent refills  · Will consult toxicology as patient is interested restarting Suboxone versus Subutex  · For now continue with p r n  Symptom control with Tylenol, clonidine, Robaxin, and loperamide p r n   · 3/1: patient will start suboxone   · COWS score q 1 hr give 4/1mg until COWs <8  · Pt is refusing SUBOXONE  Per toxocology he can ONLY have suboxone therapy for withdrawals      * Staphylococcus aureus bacteremia  Assessment & Plan  Hx of MSSA bacteremia and possible septic pulmonary emboli through 04/03/2020 (tx with 4 days of cefazolin IV while admitted at [facility], then one week of  daptomycin IV at infusion center through 03/17/2020, and Patient was transitioned to linezolid p o  For the last 2 weeks of his antibiotic course)     Patient has a history of IV drug abuse and presented to [facility] 2 days ago with fever chest pain and dyspnea  Was receiving IV antibiotics for suspected endocarditis but decided to leave A as he was unhappy with care and decided to come to [facility]     Per chart review, 1/2 blood cultures positive for Staph a susceptible to vancomycin  CTA C/A/P w/o acute abnormalities and echocardiogram at [facility] did not show any signs of embolism or septic emboli  EKG sinus tachycardia with no acute ST/T-wave changes  + leukocytosis at 18,000  , procalcitonin 0 75  · Repeat blood cultures once again 1/2 positive for GPC  · Repeat blood cultures drawn on 3/1  · Continue with IV Ancef  · Infectious disease consulted, appreciate recommendations  · Monitor fever curve white blood cell count    MAURA 3/2- EF 60 %, No RWMA, trace MR, mild TR, No vegetation or aneurysm  -will continue antibiotics  Acute right-sided thoracic back pain-resolved as of 2/28/2021  Assessment & Plan  Resolved  Noted to have four-day history of worsening right-sided thoracic back pain, sharp in nature  Appears to be paraspinal area T10-11  Noted  improvement with Robaxin and Valium received at [facility]  likely musculoskeletal  · , ESR 86  Benign exam, hold off on MRI at this time  · P r n  Pain management with Tylenol, Robaxin    TRAVIS (acute kidney injury) (HCC)-resolved as of 2/28/2021  Assessment & Plan  creatinine on presentation was 1 51,baseline appears to be around 1  Etiology unknown possibly in setting of receiving multiple doses of Toradol versus poor p o  Intake   patient received 1 L isolate in the emergency department  · Creatinine 0 88 today  · follow-up BMP and trend creatinine      Disposition: IVDU / MAURA today / IV Abx     SUBJECTIVE       Patient seen and examined  No acute events overnight  This morning he complains of withdrawal symptoms and says the [facility] of 8mg that he received yesterday was too much and that he prefers 4mg  He endorses fevers, chills, nausea, abdominal pain, and a new productive cough with brown sputum  He denies chest pain, SOB, vomit, diarrhea and all other ROS are negative         OBJECTIVE     Vitals:    21 0734 21 1559 21 2146 21 0725   BP: 140/81 138/78 134/71 155/87   Pulse:   91 85   Resp:   18 18   Temp: 98 4 °F (36 9 °C) 98 1 °F (36 7 °C) 98 3 °F (36 8 °C) 98 7 °F (37 1 °C)   TempSrc:   Oral    SpO2:       Weight:       Height:          Temperature:   Temp (24hrs), Av 4 °F (36 9 °C), Min:98 1 °F (36 7 °C), Max:98 7 °F (37 1 °C)    Temperature: 98 7 °F (37 1 °C)  Intake & Output:  I/O        07 -  0700  0701 -  0700 / 0701 -  0700    P  O  1340      I V  (mL/kg) 951 7 (10 8)  300 (3 4)    IV Piggyback 150 100     Total Intake(mL/kg) 2441 7 (27 6) 100 (1 1) 300 (3 4)    Net +2441 7 +100 +300           Unmeasured Urine Occurrence 1 x 1 x         Weights:   IBW: 68 4 kg    Body mass index is 29 65 kg/m²  Weight (last 2 days)     None        Physical Exam  Constitutional:       General: He is not in acute distress  Appearance: He is ill-appearing  He is not toxic-appearing or diaphoretic  HENT:      Head: Normocephalic and atraumatic  Cardiovascular:      Rate and Rhythm: Normal rate and regular rhythm  Pulses: Normal pulses  Heart sounds: Murmur (left sternal border holosystolic 3/) present  No friction rub  No gallop  Pulmonary:      Effort: Pulmonary effort is normal  No respiratory distress  Breath sounds: Normal breath sounds  No stridor  No wheezing, rhonchi or rales  Chest:      Chest wall: No tenderness  Abdominal:      General: Abdomen is flat  Bowel sounds are normal  There is no distension  Palpations: Abdomen is soft   There is no mass  Tenderness: There is no abdominal tenderness  There is no right CVA tenderness, left CVA tenderness, guarding or rebound  Hernia: No hernia is present  Musculoskeletal:      Right lower leg: No edema  Left lower leg: No edema  Neurological:      General: No focal deficit present  Mental Status: He is alert and oriented to person, place, and time  Psychiatric:         Mood and Affect: Mood normal          Behavior: Behavior normal        LABORATORY DATA     Labs: I have personally reviewed pertinent reports  Results from last 7 days   Lab Units 03/02/21  0559 02/27/21  0535 02/26/21  0115   WBC Thousand/uL 16 33* 10 36* 18 94*   HEMOGLOBIN g/dL 17 6* 13 0 15 2   HEMATOCRIT % 50 2* 38 6 43 8   PLATELETS Thousands/uL 469* 285 295   NEUTROS PCT % 76* 60 78*   MONOS PCT % 7 11 12      Results from last 7 days   Lab Units 02/28/21  0801 02/27/21  0535 02/26/21  0115   POTASSIUM mmol/L 4 3 3 3* 3 9   CHLORIDE mmol/L 106 107 102   CO2 mmol/L 25 30 27   BUN mg/dL 6 11 17   CREATININE mg/dL 0 60 0 88 1 51*   CALCIUM mg/dL 6 8* 8 4 8 7   ALK PHOS U/L  --   --  59   ALT U/L  --   --  46   AST U/L  --   --  24              Results from last 7 days   Lab Units 02/26/21  0115   INR  1 10   PTT seconds 29     Results from last 7 days   Lab Units 02/26/21  0115   LACTIC ACID mmol/L 1 9           IMAGING & DIAGNOSTIC TESTING     Radiology Results: I have personally reviewed pertinent reports  No results found  Other Diagnostic Testing: I have personally reviewed pertinent reports      ACTIVE MEDICATIONS     Current Facility-Administered Medications   Medication Dose Route Frequency    acetaminophen (TYLENOL) tablet 650 mg  650 mg Oral Q6H PRN    buprenorphine-naloxone (SUBOXONE) 8-2 mg per SL film 1 Film  1 Film Sublingual Daily    ceFAZolin (ANCEF) IVPB (premix in dextrose) 2,000 mg 50 mL  2,000 mg Intravenous Q8H    loperamide (IMODIUM) capsule 2 mg  2 mg Oral Q4H PRN    melatonin tablet 3 mg  3 mg Oral HS    methocarbamol (ROBAXIN) tablet 500 mg  500 mg Oral Q6H PRN    nicotine (NICODERM CQ) 7 mg/24hr TD 24 hr patch 1 patch  1 patch Transdermal Daily       VTE Pharmacologic Prophylaxis: Sequential compression device (Venodyne)   VTE Mechanical Prophylaxis: sequential compression device    Portions of the record may have been created with voice recognition software  Occasional wrong word or "sound a like" substitutions may have occurred due to the inherent limitations of voice recognition software    Read the chart carefully and recognize, using context, where substitutions have occurred   ==  Soni Ugarte MD  [facility]  Internal Medicine Residency PGY-1

## 2021-03-02 NOTE — CASE MANAGEMENT
CM informed pt wants to go to IP rehab for IVDU  Pt's wife at bedside-[facility] to inform her HOST was called and will be speaking with him today for initial assessment  CM faxed H&P, facesheet to [phone] per request of HOST  Wife did inform CM that she prefers him not go to [facility], as she does not feel it is successful  CM will really to HOST

## 2021-03-02 NOTE — PLAN OF CARE
Problem: PAIN - ADULT  Goal: Verbalizes/displays adequate comfort level or baseline comfort level  Description: Interventions:  - Encourage patient to monitor pain and request assistance  - Assess pain using appropriate pain scale  - Administer analgesics based on type and severity of pain and evaluate response  - Implement non-pharmacological measures as appropriate and evaluate response  - Consider cultural and social influences on pain and pain management  - Notify physician/advanced practitioner if interventions unsuccessful or patient reports new pain  Outcome: Progressing     Problem: INFECTION - ADULT  Goal: Absence or prevention of progression during hospitalization  Description: INTERVENTIONS:  - Assess and monitor for signs and symptoms of infection  - Monitor lab/diagnostic results  - Monitor all insertion sites, i e  indwelling lines, tubes, and drains  - Monitor endotracheal if appropriate and nasal secretions for changes in amount and color  - [place] appropriate cooling/warming therapies per order  - Administer medications as ordered  - Instruct and encourage patient and family to use good hand hygiene technique  - Identify and instruct in appropriate isolation precautions for identified infection/condition  Outcome: Progressing     Problem: SAFETY ADULT  Goal: Patient will remain free of falls  Description: INTERVENTIONS:  - Assess patient frequently for physical needs  -  Identify cognitive and physical deficits and behaviors that affect risk of falls    -  [place] fall precautions as indicated by assessment   - Educate patient/family on patient safety including physical limitations  - Instruct patient to call for assistance with activity based on assessment  - Modify environment to reduce risk of injury  - Consider OT/PT consult to assist with strengthening/mobility  Outcome: Progressing  Goal: Maintain or return to baseline ADL function  Description: INTERVENTIONS:  -  Assess patient's ability to carry out ADLs; assess patient's baseline for ADL function and identify physical deficits which impact ability to perform ADLs (bathing, care of mouth/teeth, toileting, grooming, dressing, etc )  - Assess/evaluate cause of self-care deficits   - Assess range of motion  - Assess patient's mobility; develop plan if impaired  - Assess patient's need for assistive devices and provide as appropriate  - Encourage maximum independence but intervene and supervise when necessary  - Involve family in performance of ADLs  - Assess for home care needs following discharge   - Consider OT consult to assist with ADL evaluation and planning for discharge  - Provide patient education as appropriate  Outcome: Progressing  Goal: Maintain or return mobility status to optimal level  Description: INTERVENTIONS:  - Assess patient's baseline mobility status (ambulation, transfers, stairs, etc )    - Identify cognitive and physical deficits and behaviors that affect mobility  - Identify mobility aids required to assist with transfers and/or ambulation (gait belt, sit-to-stand, lift, walker, cane, etc )  - [place] fall precautions as indicated by assessment  - Record patient progress and toleration of activity level on Mobility SBAR; progress patient to next Phase/Stage  - Instruct patient to call for assistance with activity based on assessment  - Consider rehabilitation consult to assist with strengthening/weightbearing, etc   Outcome: Progressing     Problem: DISCHARGE PLANNING  Goal: Discharge to home or other facility with appropriate resources  Description: INTERVENTIONS:  - Identify barriers to discharge w/patient and caregiver  - Arrange for needed discharge resources and transportation as appropriate  - Identify discharge learning needs (meds, wound care, etc )  - Arrange for interpretive services to assist at discharge as needed  - Refer to Case Management Department for coordinating discharge planning if the patient needs post-hospital services based on physician/advanced practitioner order or complex needs related to functional status, cognitive ability, or social support system  Outcome: Progressing     Problem: Knowledge Deficit  Goal: Patient/family/caregiver demonstrates understanding of disease process, treatment plan, medications, and discharge instructions  Description: Complete learning assessment and assess knowledge base  Interventions:  - Provide teaching at level of understanding  - Provide teaching via preferred learning methods  Outcome: Progressing     Problem: CARDIOVASCULAR - ADULT  Goal: Maintains optimal cardiac output and hemodynamic stability  Description: INTERVENTIONS:  - Monitor I/O, vital signs and rhythm  - Monitor for S/S and trends of decreased cardiac output  - Administer and titrate ordered vasoactive medications to optimize hemodynamic stability  - Assess quality of pulses, skin color and temperature  - Assess for signs of decreased coronary artery perfusion  - Instruct patient to report change in severity of symptoms  Outcome: Progressing  Goal: Absence of cardiac dysrhythmias or at baseline rhythm  Description: INTERVENTIONS:  - Continuous cardiac monitoring, vital signs, obtain 12 lead EKG if ordered  - Administer antiarrhythmic and heart rate control medications as ordered  - Monitor electrolytes and administer replacement therapy as ordered  Outcome: Progressing     Problem: HEMATOLOGIC - ADULT  Goal: Maintains hematologic stability  Description: INTERVENTIONS  - Assess for signs and symptoms of bleeding or hemorrhage  - Monitor labs  - Administer supportive blood products/factors as ordered and appropriate  Outcome: Progressing     Problem: Potential for Falls  Goal: Patient will remain free of falls  Description: INTERVENTIONS:  - Assess patient frequently for physical needs  -  Identify cognitive and physical deficits and behaviors that affect risk of falls    -  [place] fall precautions as indicated by assessment   - Educate patient/family on patient safety including physical limitations  - Instruct patient to call for assistance with activity based on assessment  - Modify environment to reduce risk of injury  - Consider OT/PT consult to assist with strengthening/mobility  Outcome: Progressing

## 2021-03-02 NOTE — PROGRESS NOTES
INTERNAL MEDICINE RESIDENCY PROGRESS NOTE     Name: Vangie Damon   Age & Sex: 35 y o  male   MRN: [id number]  Unit/Bed#: -01   Encounter: [id number]  Team: SOD Team A    PATIENT INFORMATION     Name: Vangie Damon   Age & Sex: 35 y o  male   MRN: [id number]  Hospital Stay Days: 3    ASSESSMENT/PLAN     Principal Problem:    Staphylococcus aureus bacteremia  Active Problems:    Opioid use disorder ([address] )    Hepatitis C      Hepatitis C  Assessment & Plan  Per chart review, chronic hepatitis C  Did see Dr Gurpreet Massey outpatient in November of 2020, and was started on [address] on 12/14/2020, but patient reports stopping treatment retirement as he did not receive the second pack of medications in mail yet  · Will need close outpatient follow-up with GI for treatment     Opioid use disorder [facility])  Assessment & Plan  History of IVDU in past including methamphetamine, heroin, and cocaine  Has not used in about a year, until he relapsed 10 days ago  Used 1 time  Reports being on Suboxone, per PDMP reviewed has not been filled since July 2020  Patient did receive 1 dose of Suboxone 8/2 mg b i d, well as IV Valium, fentanyl, and Toradol at [facility]  · UDS 2/27 + cocaine and methamphetamine, benzo and opiate  · Will contact [address] patient reports receiving medications from an confirm dosing and recent refills  · Will consult toxicology as patient is interested restarting Suboxone versus Subutex  · For now continue with p r n  Symptom control with Tylenol, clonidine, Robaxin, and loperamide p r n   · 3/1: patient will start suboxone   · COWS score q 1 hr give 4/1mg until COWs <8  · Pt is refusing SUBOXONE  Per toxocology he can ONLY have suboxone therapy for withdrawals      * Staphylococcus aureus bacteremia  Assessment & Plan  Hx of MSSA bacteremia and possible septic pulmonary emboli through 04/03/2020 (tx with 4 days of cefazolin IV while admitted at [facility], then one week of  daptomycin IV at infusion center through 03/17/2020, and Patient was transitioned to linezolid p o  For the last 2 weeks of his antibiotic course)     Patient has a history of IV drug abuse and presented to [facility] 2 days ago with fever chest pain and dyspnea  Was receiving IV antibiotics for suspected endocarditis but decided to leave AMA as he was unhappy with care and decided to come to hospitals     Per chart review, 1/2 blood cultures positive for Staph a susceptible to vancomycin  CTA C/A/P w/o acute abnormalities and echocardiogram at [facility] did not show any signs of embolism or septic emboli  EKG sinus tachycardia with no acute ST/T-wave changes  + leukocytosis at 18,000  , procalcitonin 0 75  · Tentative plan for MAURA tomorrow- refused today  Likely tomorrow 3/2  · Repeat blood cultures once again 1/2 positive for GPC  · Repeat blood cultures drawn on 3/1  · Continue with IV Ancef  · Infectious disease consulted, appreciate recommendations  · Monitor fever curve white blood cell count  Acute right-sided thoracic back pain-resolved as of 2/28/2021  Assessment & Plan  Resolved  Noted to have four-day history of worsening right-sided thoracic back pain, sharp in nature  Appears to be paraspinal area T10-11  Noted  improvement with Robaxin and Valium received at [facility]  likely musculoskeletal  · , ESR 86  Benign exam, hold off on MRI at this time  · P r n  Pain management with Tylenol, Robaxin    TRAVIS (acute kidney injury) (HCC)-resolved as of 2/28/2021  Assessment & Plan  creatinine on presentation was 1 51,baseline appears to be around 1  Etiology unknown possibly in setting of receiving multiple doses of Toradol versus poor p o  Intake  patient received 1 L isolate in the emergency department  · Creatinine 0 88 today  · follow-up BMP and trend creatinine        Disposition: inpatient for IV antibiotics and MAURA     SUBJECTIVE     Patient seen and examined  No acute events overnight   This morning he complains of withdrawal symptoms and says the [facility] of 8mg that he received yesterday was too much and that he prefers 4mg  He endorses fevers, chills, nausea, abdominal pain, and a new productive cough with brown sputum  He denies chest pain, SOB, vomit, diarrhea and all other ROS are negative  OBJECTIVE     Vitals:    21 0734 21 1559 21 2146 21 0725   BP: 140/81 138/78 134/71 155/87   Pulse:   91    Resp:   18    Temp: 98 4 °F (36 9 °C) 98 1 °F (36 7 °C) 98 3 °F (36 8 °C) 98 7 °F (37 1 °C)   TempSrc:   Oral    SpO2:       Weight:       Height:          Temperature:   Temp (24hrs), Av 4 °F (36 9 °C), Min:98 1 °F (36 7 °C), Max:98 7 °F (37 1 °C)    Temperature: 98 7 °F (37 1 °C)  Intake & Output:  I/O        07 -  0700  07 -  0700 / 0701 -  0700    P  O  1340      I V  (mL/kg) 951 7 (10 8)      IV Piggyback 150 100     Total Intake(mL/kg) 2441 7 (27 6) 100 (1 1)     Net +2441 7 +100            Unmeasured Urine Occurrence 1 x 1 x         Weights:   IBW: 68 4 kg    Body mass index is 29 65 kg/m²  Weight (last 2 days)     None        Physical Exam  Constitutional:       General: He is not in acute distress  Appearance: He is ill-appearing  HENT:      Mouth/Throat:      Mouth: Mucous membranes are moist    Cardiovascular:      Rate and Rhythm: Normal rate and regular rhythm  Heart sounds: S1 normal and S2 normal  Murmur (Tricuspid) present  Systolic murmur present  Pulmonary:      Effort: No respiratory distress  Breath sounds: Normal breath sounds  No wheezing or rales  Abdominal:      General: Bowel sounds are normal  There is no distension  Palpations: Abdomen is soft  Tenderness: There is no abdominal tenderness  Skin:     General: Skin is warm and dry  Capillary Refill: Capillary refill takes less than 2 seconds  Neurological:      Mental Status: He is alert and oriented to person, place, and time  LABORATORY DATA     Labs: I have personally reviewed pertinent reports  Results from last 7 days   Lab Units 03/02/21  0559 02/27/21  0535 02/26/21  0115   WBC Thousand/uL 16 33* 10 36* 18 94*   HEMOGLOBIN g/dL 17 6* 13 0 15 2   HEMATOCRIT % 50 2* 38 6 43 8   PLATELETS Thousands/uL 469* 285 295   NEUTROS PCT % 76* 60 78*   MONOS PCT % 7 11 12      Results from last 7 days   Lab Units 02/28/21  0801 02/27/21  0535 02/26/21  0115   POTASSIUM mmol/L 4 3 3 3* 3 9   CHLORIDE mmol/L 106 107 102   CO2 mmol/L 25 30 27   BUN mg/dL 6 11 17   CREATININE mg/dL 0 60 0 88 1 51*   CALCIUM mg/dL 6 8* 8 4 8 7   ALK PHOS U/L  --   --  59   ALT U/L  --   --  46   AST U/L  --   --  24              Results from last 7 days   Lab Units 02/26/21  0115   INR  1 10   PTT seconds 29     Results from last 7 days   Lab Units 02/26/21  0115   LACTIC ACID mmol/L 1 9           IMAGING & DIAGNOSTIC TESTING     Radiology Results: I have personally reviewed pertinent reports  No results found  Other Diagnostic Testing: I have personally reviewed pertinent reports      ACTIVE MEDICATIONS     Current Facility-Administered Medications   Medication Dose Route Frequency    acetaminophen (TYLENOL) tablet 650 mg  650 mg Oral Q6H PRN    buprenorphine-naloxone (SUBOXONE) 2-0 5 mg per SL film 2 Film  2 Film Sublingual Once    buprenorphine-naloxone (SUBOXONE) 8-2 mg per SL film 1 Film  1 Film Sublingual Daily    ceFAZolin (ANCEF) IVPB (premix in dextrose) 2,000 mg 50 mL  2,000 mg Intravenous Q8H    loperamide (IMODIUM) capsule 2 mg  2 mg Oral Q4H PRN    melatonin tablet 3 mg  3 mg Oral HS    methocarbamol (ROBAXIN) tablet 500 mg  500 mg Oral Q6H PRN    nicotine (NICODERM CQ) 7 mg/24hr TD 24 hr patch 1 patch  1 patch Transdermal Daily       VTE Pharmacologic Prophylaxis: Sequential compression device (Venodyne)   VTE Mechanical Prophylaxis: sequential compression device    Portions of the record may have been created with voice recognition software  Occasional wrong word or "sound a like" substitutions may have occurred due to the inherent limitations of voice recognition software    Read the chart carefully and recognize, using context, where substitutions have occurred   ==  Jonnie Chao  MS3

## 2021-03-03 VITALS
BODY MASS INDEX: 29.55 KG/M2 | RESPIRATION RATE: 18 BRPM | HEIGHT: 68 IN | OXYGEN SATURATION: 97 % | DIASTOLIC BLOOD PRESSURE: 88 MMHG | WEIGHT: 195 LBS | SYSTOLIC BLOOD PRESSURE: 151 MMHG | HEART RATE: 86 BPM | TEMPERATURE: 97.6 F

## 2021-03-03 LAB
ANION GAP SERPL CALCULATED.3IONS-SCNC: 3 MMOL/L (ref 4–13)
BACTERIA BLD CULT: NORMAL
BASOPHILS # BLD AUTO: 0.09 THOUSANDS/ΜL (ref 0–0.1)
BASOPHILS NFR BLD AUTO: 1 % (ref 0–1)
BUN SERPL-MCNC: 20 MG/DL (ref 5–25)
CALCIUM SERPL-MCNC: 9.5 MG/DL (ref 8.3–10.1)
CHLORIDE SERPL-SCNC: 107 MMOL/L (ref 100–108)
CO2 SERPL-SCNC: 30 MMOL/L (ref 21–32)
CREAT SERPL-MCNC: 1.39 MG/DL (ref 0.6–1.3)
EOSINOPHIL # BLD AUTO: 0.39 THOUSAND/ΜL (ref 0–0.61)
EOSINOPHIL NFR BLD AUTO: 2 % (ref 0–6)
ERYTHROCYTE [DISTWIDTH] IN BLOOD BY AUTOMATED COUNT: 12 % (ref 11.6–15.1)
GFR SERPL CREATININE-BSD FRML MDRD: 68 ML/MIN/1.73SQ M
GLUCOSE SERPL-MCNC: 89 MG/DL (ref 65–140)
HCT VFR BLD AUTO: 48 % (ref 36.5–49.3)
HGB BLD-MCNC: 16.2 G/DL (ref 12–17)
IMM GRANULOCYTES # BLD AUTO: 0.36 THOUSAND/UL (ref 0–0.2)
IMM GRANULOCYTES NFR BLD AUTO: 2 % (ref 0–2)
LYMPHOCYTES # BLD AUTO: 3.78 THOUSANDS/ΜL (ref 0.6–4.47)
LYMPHOCYTES NFR BLD AUTO: 24 % (ref 14–44)
MCH RBC QN AUTO: 29.8 PG (ref 26.8–34.3)
MCHC RBC AUTO-ENTMCNC: 33.8 G/DL (ref 31.4–37.4)
MCV RBC AUTO: 88 FL (ref 82–98)
MONOCYTES # BLD AUTO: 1.96 THOUSAND/ΜL (ref 0.17–1.22)
MONOCYTES NFR BLD AUTO: 12 % (ref 4–12)
NEUTROPHILS # BLD AUTO: 9.4 THOUSANDS/ΜL (ref 1.85–7.62)
NEUTS SEG NFR BLD AUTO: 59 % (ref 43–75)
NRBC BLD AUTO-RTO: 0 /100 WBCS
PLATELET # BLD AUTO: 488 THOUSANDS/UL (ref 149–390)
PMV BLD AUTO: 8.4 FL (ref 8.9–12.7)
POTASSIUM SERPL-SCNC: 4.6 MMOL/L (ref 3.5–5.3)
RBC # BLD AUTO: 5.43 MILLION/UL (ref 3.88–5.62)
SODIUM SERPL-SCNC: 140 MMOL/L (ref 136–145)
WBC # BLD AUTO: 15.98 THOUSAND/UL (ref 4.31–10.16)

## 2021-03-03 PROCEDURE — NC001 PR NO CHARGE: Performed by: INTERNAL MEDICINE

## 2021-03-03 PROCEDURE — 80048 BASIC METABOLIC PNL TOTAL CA: CPT | Performed by: STUDENT IN AN ORGANIZED HEALTH CARE EDUCATION/TRAINING PROGRAM

## 2021-03-03 PROCEDURE — 85025 COMPLETE CBC W/AUTO DIFF WBC: CPT | Performed by: STUDENT IN AN ORGANIZED HEALTH CARE EDUCATION/TRAINING PROGRAM

## 2021-03-03 PROCEDURE — 99232 SBSQ HOSP IP/OBS MODERATE 35: CPT | Performed by: INTERNAL MEDICINE

## 2021-03-03 RX ORDER — SODIUM CHLORIDE, SODIUM LACTATE, POTASSIUM CHLORIDE, CALCIUM CHLORIDE 600; 310; 30; 20 MG/100ML; MG/100ML; MG/100ML; MG/100ML
125 INJECTION, SOLUTION INTRAVENOUS CONTINUOUS
Status: DISCONTINUED | OUTPATIENT
Start: 2021-03-03 | End: 2021-03-03 | Stop reason: HOSPADM

## 2021-03-03 RX ADMIN — METHOCARBAMOL 500 MG: 500 TABLET ORAL at 07:59

## 2021-03-03 RX ADMIN — LOPERAMIDE HYDROCHLORIDE 2 MG: 2 CAPSULE ORAL at 08:23

## 2021-03-03 RX ADMIN — CEFAZOLIN SODIUM 2000 MG: 2 SOLUTION INTRAVENOUS at 10:25

## 2021-03-03 RX ADMIN — SODIUM CHLORIDE, SODIUM LACTATE, POTASSIUM CHLORIDE, AND CALCIUM CHLORIDE 125 ML/HR: .6; .31; .03; .02 INJECTION, SOLUTION INTRAVENOUS at 07:59

## 2021-03-03 RX ADMIN — CEFAZOLIN SODIUM 2000 MG: 2 SOLUTION INTRAVENOUS at 02:28

## 2021-03-03 NOTE — PROGRESS NOTES
INTERNAL MEDICINE RESIDENCY PROGRESS NOTE     Name: Dell Reich   Age & Sex: 35 y o  male   MRN: [id number]  Unit/Bed#: -01   Encounter: [id number]  Team: SOD Team A    PATIENT INFORMATION     Name: Dell Reich   Age & Sex: 35 y o  male   MRN: [id number]  Hospital Stay Days: 4    ASSESSMENT/PLAN     Principal Problem:    Staphylococcus aureus bacteremia  Active Problems:    Opioid use disorder ([facility] )    Hepatitis C    Nicotine dependence    TRAVIS (acute kidney injury) ([facility] )    Smoking      TRAVIS (acute kidney injury) ([facility] )  Assessment & Plan  creatinine on presentation was 1 51,baseline appears to be around 1  Etiology unknown possibly in setting of receiving multiple doses of Toradol versus poor p o  Intake  patient received 1 L isolate in the emergency department      · 3/4 Creatinine 1 38 today- likely prerenal, decrease PO intake  · Will give fluids  cc/hr  · Follow up BMP    Hepatitis C  Assessment & Plan  Per chart review, chronic hepatitis C  Did see Dr Kaley Schaffer outpatient in November of 2020, and was started on [address] on 12/14/2020, but patient reports stopping treatment long term as he did not receive the second pack of medications in mail yet  · Will need close outpatient follow-up with GI for treatment     Opioid use disorder [facility])  Assessment & Plan  History of IVDU in past including methamphetamine, heroin, and cocaine  Has not used in about a year, until he relapsed 10 days ago  Used 1 time  Reports being on Suboxone, per PDMP reviewed has not been filled since July 2020  Patient did receive 1 dose of Suboxone 8/2 mg b i d, well as IV Valium, fentanyl, and Toradol at [facility]  · UDS 2/27 + cocaine and methamphetamine, benzo and opiate  · Will contact [employer] patient reports receiving medications from an confirm dosing and recent refills  · Will consult toxicology as patient is interested restarting Suboxone versus Subutex  · For now continue with p r n   Symptom control with Tylenol, clonidine, Robaxin, and loperamide p r n   · 3/1: patient will start suboxone   · COWS score q 1 hr give 4/1mg until COWs <8  · Pt is refusing SUBOXONE  Per toxocology he can ONLY have suboxone therapy for withdrawals  * Staphylococcus aureus bacteremia  Assessment & Plan  Hx of MSSA bacteremia and possible septic pulmonary emboli through 04/03/2020 (tx with 4 days of cefazolin IV while admitted at [facility], then one week of  daptomycin IV at infusion center through 03/17/2020, and Patient was transitioned to linezolid p o  For the last 2 weeks of his antibiotic course)     Patient has a history of IV drug abuse and presented to [facility] 2 days ago with fever chest pain and dyspnea  Was receiving IV antibiotics for suspected endocarditis but decided to leave AMA as he was unhappy with care and decided to come to [facility]     Per chart review, 1/2 blood cultures positive for Staph a susceptible to vancomycin  CTA C/A/P w/o acute abnormalities and echocardiogram at [facility] did not show any signs of embolism or septic emboli  EKG sinus tachycardia with no acute ST/T-wave changes  + leukocytosis at 18,000  , procalcitonin 0 75  · Repeat blood cultures once again 1/2 positive for GPC  · Repeat blood cultures drawn on 3/1  · Continue with IV Ancef D4  · Infectious disease consulted, appreciate recommendations  · Monitor fever curve white blood cell count    MAURA 3/2- EF 60 %, No RWMA, trace MR, mild TR, No vegetation or aneurysm  -will continue antibiotics  3/4-pt wanted to leave AMA yday  However now would like to stay for IV abx and Rehab post discharge    -pending repeat Cx  Acute right-sided thoracic back pain-resolved as of 2/28/2021  Assessment & Plan  Resolved  Noted to have four-day history of worsening right-sided thoracic back pain, sharp in nature  Appears to be paraspinal area T10-11  Noted  improvement with Robaxin and Valium received at [employer]    likely musculoskeletal  · , ESR 86  Benign exam, hold off on MRI at this time  · P r n  Pain management with Tylenol, Robaxin      Disposition: IVDU / ABx     SUBJECTIVE     Patient seen and examined  No acute events overnight  Patient had no complaints this morning and appeared much improved from yesterday as he is now only experiencing some restless legs from withdrawal but no other symptoms  He refused his suboxone yesterday as he feels it causes his "stomach to turn" and requests being switched to subutex  He denies fever, chills, chest pain, SOB, nausea, vomiting, and all other ROS are negative  OBJECTIVE     Vitals:    21 1559 21 2146 21 0725 21 0700   BP: 138/78 134/71 155/87 138/76   Pulse:  91 85 86   Resp:  18 18 18   Temp: 98 1 °F (36 7 °C) 98 3 °F (36 8 °C) 98 7 °F (37 1 °C) 98 °F (36 7 °C)   TempSrc:  Oral  Oral   SpO2:       Weight:       Height:          Temperature:   Temp (24hrs), Av °F (36 7 °C), Min:98 °F (36 7 °C), Max:98 °F (36 7 °C)    Temperature: 98 °F (36 7 °C)  Intake & Output:  I/O        07 -  07 07 -  0700    I V  (mL/kg)  300 (3 4)    IV Piggyback 100     Total Intake(mL/kg) 100 (1 1) 300 (3 4)    Net +100 +300          Unmeasured Urine Occurrence 1 x         Weights:   IBW: 68 4 kg    Body mass index is 29 65 kg/m²  Weight (last 2 days)     None        Physical Exam  Constitutional:       General: He is not in acute distress  Appearance: He is not ill-appearing, toxic-appearing or diaphoretic  HENT:      Head: Normocephalic and atraumatic  Nose: No congestion or rhinorrhea  Cardiovascular:      Rate and Rhythm: Normal rate and regular rhythm  Pulses: Normal pulses  Heart sounds: Murmur (right Sternal border holosystolic) present  No friction rub  No gallop  Pulmonary:      Effort: Pulmonary effort is normal  No respiratory distress  Breath sounds: Normal breath sounds  No stridor   No wheezing, rhonchi or rales  Chest:      Chest wall: No tenderness  Abdominal:      General: There is no distension  Palpations: Abdomen is soft  There is no mass  Tenderness: There is no abdominal tenderness  There is no right CVA tenderness, left CVA tenderness, guarding or rebound  Hernia: No hernia is present  Musculoskeletal:      Right lower leg: No edema  Left lower leg: No edema  Skin:     Findings: No lesion  Psychiatric:         Mood and Affect: Mood normal          Behavior: Behavior normal        LABORATORY DATA     Labs: I have personally reviewed pertinent reports  Results from last 7 days   Lab Units 03/03/21  0844 03/02/21  0559 02/27/21  0535   WBC Thousand/uL 15 98* 16 33* 10 36*   HEMOGLOBIN g/dL 16 2 17 6* 13 0   HEMATOCRIT % 48 0 50 2* 38 6   PLATELETS Thousands/uL 488* 469* 285   NEUTROS PCT % 59 76* 60   MONOS PCT % 12 7 11      Results from last 7 days   Lab Units 03/03/21  0844 03/02/21 2052 02/28/21  0801  02/26/21  0115   POTASSIUM mmol/L 4 6 4 3 4 3   < > 3 9   CHLORIDE mmol/L 107 103 106   < > 102   CO2 mmol/L 30 30 25   < > 27   BUN mg/dL 20 20 6   < > 17   CREATININE mg/dL 1 39* 1 38* 0 60   < > 1 51*   CALCIUM mg/dL 9 5 9 1 6 8*   < > 8 7   ALK PHOS U/L  --   --   --   --  59   ALT U/L  --   --   --   --  46   AST U/L  --   --   --   --  24    < > = values in this interval not displayed  Results from last 7 days   Lab Units 02/26/21  0115   INR  1 10   PTT seconds 29     Results from last 7 days   Lab Units 02/26/21  0115   LACTIC ACID mmol/L 1 9           IMAGING & DIAGNOSTIC TESTING     Radiology Results: I have personally reviewed pertinent reports  No results found  Other Diagnostic Testing: I have personally reviewed pertinent reports      ACTIVE MEDICATIONS     Current Facility-Administered Medications   Medication Dose Route Frequency    acetaminophen (TYLENOL) tablet 650 mg  650 mg Oral Q6H PRN    buprenorphine-naloxone (SUBOXONE) 8-2 mg per SL film 1 Film  1 Film Sublingual Daily    ceFAZolin (ANCEF) IVPB (premix in dextrose) 2,000 mg 50 mL  2,000 mg Intravenous Q8H    lactated ringers infusion  125 mL/hr Intravenous Continuous    loperamide (IMODIUM) capsule 2 mg  2 mg Oral Q4H PRN    melatonin tablet 3 mg  3 mg Oral HS    methocarbamol (ROBAXIN) tablet 500 mg  500 mg Oral Q6H PRN    nicotine (NICODERM CQ) 7 mg/24hr TD 24 hr patch 1 patch  1 patch Transdermal Daily       VTE Pharmacologic Prophylaxis: Sequential compression device (Venodyne)   VTE Mechanical Prophylaxis: sequential compression device    Portions of the record may have been created with voice recognition software  Occasional wrong word or "sound a like" substitutions may have occurred due to the inherent limitations of voice recognition software    Read the chart carefully and recognize, using context, where substitutions have occurred   ==  Sabrina Em MD  [address]  Internal Medicine Residency PGY-1

## 2021-03-03 NOTE — PROGRESS NOTES
Went in room to hang antibiotic and change IV site but pt is having some issues on phone with family he was upset and will come back later to change IV

## 2021-03-03 NOTE — PROGRESS NOTES
Spoke with primary care this am as pt is refusing suboxone at this time, late am he is again asking it to be changed to subutex and that his  is requesting this as well, spoke with toxicology and they are not open to changing this at this time

## 2021-03-03 NOTE — DISCHARGE INSTRUCTIONS
Bacteremia   WHAT YOU NEED TO KNOW:   Bacteremia is bacteria in the blood  Bacteremia happens when germs from infections in your body travel to your blood  It can also be caused by a catheter or drain that is inserted into the body and left in place  Examples of catheters and drains include a port-a-cath, PICC line, dialysis catheter, abdominal drain, or a urinary catheter  DISCHARGE INSTRUCTIONS:   Call your local emergency number (911 or have someone else call) for any of the following:   · You have a seizure or lose consciousness  · You have trouble breathing  · You feel extremely weak and have a hard time moving  Return to the emergency department immediately if:   · Your symptoms, such as fever, get worse, even if you are taking medicine to treat the infection  · You stop urinating or urinate very little  Call your doctor if:   · You have questions or concerns about your condition or care  Medicines: You may need any of the following:  · Antibiotics  may be given to treat the infection  Do not stop taking your antibiotics when you feel better  Take all of your medicine until it is finished  This may prevent the infection from returning or getting worse  · Acetaminophen  decreases pain and fever  It is available without a doctor's order  Ask how much to take and how often to take it  Follow directions  Read the labels of all other medicines you are using to see if they also contain acetaminophen, or ask your doctor or pharmacist  Acetaminophen can cause liver damage if not taken correctly  Do not use more than 4 grams (4,000 milligrams) total of acetaminophen in one day  · NSAIDs , such as ibuprofen, help decrease swelling, pain, and fever  This medicine is available with or without a doctor's order  NSAIDs can cause stomach bleeding or kidney problems in certain people  If you take blood thinner medicine, always ask your healthcare provider if NSAIDs are safe for you   Always read the medicine label and follow directions  · Take your medicine as directed  Contact your healthcare provider if you think your medicine is not helping or if you have side effects  Tell him of her if you are allergic to any medicine  Keep a list of the medicines, vitamins, and herbs you take  Include the amounts, and when and why you take them  Bring the list or the pill bottles to follow-up visits  Carry your medicine list with you in case of an emergency  Prevent bacteremia:   · Wash your hands often  Wash your hands several times each day  Wash after you use the bathroom, change a child's diaper, and before you prepare or eat food  Use soap and water every time  Rub your soapy hands together, lacing your fingers  Wash the front and back of your hands, and in between your fingers  Use the fingers of one hand to scrub under the fingernails of the other hand  Wash for at least 20 seconds  Rinse with warm, running water for several seconds  Then dry your hands with a clean towel or paper towel  Use hand  that contains alcohol if soap and water are not available  Do not touch your eyes, nose, or mouth without washing your hands first          · Care for catheters and drains as directed  Wash your hands before and after you touch your catheter or drain  Follow directions for dressing changes and bathing  Watch for signs and symptoms of infection such as pus, fever, swelling, pain, or drainage  Report symptoms immediately to your healthcare provider  · Clean surfaces often  Clean doorknobs, countertops, cell phones, and other surfaces that are touched often  Use a disinfecting wipe, a single-use sponge, or a cloth you can wash and reuse  Use disinfecting  if you do not have wipes  You can create a disinfecting  by mixing 1 part bleach with 10 parts water  · Ask about vaccines you may need  Get all recommended vaccinations   The pneumonia and influenza vaccines may prevent lung infections that could cause bacteremia  Your healthcare provider can recommend other vaccines and tell you when to get them  Follow up with your doctor as directed: You may need to return for more blood tests  This will show if the antibiotics are working  Write down your questions so you remember to ask them during your visits  © Copyright [address] Information is for End User's use only and may not be sold, redistributed or otherwise used for commercial purposes  All illustrations and images included in CareNotes® are the copyrighted property of A D A M , Inc  or [facility] Estefani Allen  The above information is an  only  It is not intended as medical advice for individual conditions or treatments  Talk to your doctor, nurse or pharmacist before following any medical regimen to see if it is safe and effective for you

## 2021-03-03 NOTE — ANESTHESIA POSTPROCEDURE EVALUATION
Post-Op Assessment Note    CV Status:  Stable  Pain Score: 0    Pain management: adequate     Mental Status:  Alert      Post Op Vitals Reviewed: Yes      Staff: Anesthesiologist         No complications documented      BP      Temp      Pulse     Resp      SpO2

## 2021-03-03 NOTE — CASE MANAGEMENT
CM received call from HOST stating they completed assessment with pt  Once pt is medically cleared after IV ABX they will start the bed search

## 2021-03-03 NOTE — PLAN OF CARE
Problem: PAIN - ADULT  Goal: Verbalizes/displays adequate comfort level or baseline comfort level  Description: Interventions:  - Encourage patient to monitor pain and request assistance  - Assess pain using appropriate pain scale  - Administer analgesics based on type and severity of pain and evaluate response  - Implement non-pharmacological measures as appropriate and evaluate response  - Consider cultural and social influences on pain and pain management  - Notify physician/advanced practitioner if interventions unsuccessful or patient reports new pain  Outcome: Progressing     Problem: INFECTION - ADULT  Goal: Absence or prevention of progression during hospitalization  Description: INTERVENTIONS:  - Assess and monitor for signs and symptoms of infection  - Monitor lab/diagnostic results  - Monitor all insertion sites, i e  indwelling lines, tubes, and drains  - Monitor endotracheal if appropriate and nasal secretions for changes in amount and color  - [place] appropriate cooling/warming therapies per order  - Administer medications as ordered  - Instruct and encourage patient and family to use good hand hygiene technique  - Identify and instruct in appropriate isolation precautions for identified infection/condition  Outcome: Progressing     Problem: SAFETY ADULT  Goal: Patient will remain free of falls  Description: INTERVENTIONS:  - Assess patient frequently for physical needs  -  Identify cognitive and physical deficits and behaviors that affect risk of falls    -  [place] fall precautions as indicated by assessment   - Educate patient/family on patient safety including physical limitations  - Instruct patient to call for assistance with activity based on assessment  - Modify environment to reduce risk of injury  - Consider OT/PT consult to assist with strengthening/mobility  Outcome: Progressing  Goal: Maintain or return to baseline ADL function  Description: INTERVENTIONS:  -  Assess patient's ability to carry out ADLs; assess patient's baseline for ADL function and identify physical deficits which impact ability to perform ADLs (bathing, care of mouth/teeth, toileting, grooming, dressing, etc )  - Assess/evaluate cause of self-care deficits   - Assess range of motion  - Assess patient's mobility; develop plan if impaired  - Assess patient's need for assistive devices and provide as appropriate  - Encourage maximum independence but intervene and supervise when necessary  - Involve family in performance of ADLs  - Assess for home care needs following discharge   - Consider OT consult to assist with ADL evaluation and planning for discharge  - Provide patient education as appropriate  Outcome: Progressing  Goal: Maintain or return mobility status to optimal level  Description: INTERVENTIONS:  - Assess patient's baseline mobility status (ambulation, transfers, stairs, etc )    - Identify cognitive and physical deficits and behaviors that affect mobility  - Identify mobility aids required to assist with transfers and/or ambulation (gait belt, sit-to-stand, lift, walker, cane, etc )  - [place] fall precautions as indicated by assessment  - Record patient progress and toleration of activity level on Mobility SBAR; progress patient to next Phase/Stage  - Instruct patient to call for assistance with activity based on assessment  - Consider rehabilitation consult to assist with strengthening/weightbearing, etc   Outcome: Progressing     Problem: DISCHARGE PLANNING  Goal: Discharge to home or other facility with appropriate resources  Description: INTERVENTIONS:  - Identify barriers to discharge w/patient and caregiver  - Arrange for needed discharge resources and transportation as appropriate  - Identify discharge learning needs (meds, wound care, etc )  - Arrange for interpretive services to assist at discharge as needed  - Refer to Case Management Department for coordinating discharge planning if the patient needs post-hospital services based on physician/advanced practitioner order or complex needs related to functional status, cognitive ability, or social support system  Outcome: Progressing     Problem: Knowledge Deficit  Goal: Patient/family/caregiver demonstrates understanding of disease process, treatment plan, medications, and discharge instructions  Description: Complete learning assessment and assess knowledge base  Interventions:  - Provide teaching at level of understanding  - Provide teaching via preferred learning methods  Outcome: Progressing     Problem: CARDIOVASCULAR - ADULT  Goal: Maintains optimal cardiac output and hemodynamic stability  Description: INTERVENTIONS:  - Monitor I/O, vital signs and rhythm  - Monitor for S/S and trends of decreased cardiac output  - Administer and titrate ordered vasoactive medications to optimize hemodynamic stability  - Assess quality of pulses, skin color and temperature  - Assess for signs of decreased coronary artery perfusion  - Instruct patient to report change in severity of symptoms  Outcome: Progressing  Goal: Absence of cardiac dysrhythmias or at baseline rhythm  Description: INTERVENTIONS:  - Continuous cardiac monitoring, vital signs, obtain 12 lead EKG if ordered  - Administer antiarrhythmic and heart rate control medications as ordered  - Monitor electrolytes and administer replacement therapy as ordered  Outcome: Progressing     Problem: HEMATOLOGIC - ADULT  Goal: Maintains hematologic stability  Description: INTERVENTIONS  - Assess for signs and symptoms of bleeding or hemorrhage  - Monitor labs  - Administer supportive blood products/factors as ordered and appropriate  Outcome: Progressing     Problem: Potential for Falls  Goal: Patient will remain free of falls  Description: INTERVENTIONS:  - Assess patient frequently for physical needs  -  Identify cognitive and physical deficits and behaviors that affect risk of falls    -  [place] fall precautions as indicated by assessment   - Educate patient/family on patient safety including physical limitations  - Instruct patient to call for assistance with activity based on assessment  - Modify environment to reduce risk of injury  - Consider OT/PT consult to assist with strengthening/mobility  Outcome: Progressing

## 2021-03-03 NOTE — PROGRESS NOTES
INTERNAL MEDICINE RESIDENCY PROGRESS NOTE     Name: Angeli Hernández   Age & Sex: 35 y o  male   MRN: [id number]  Unit/Bed#: -01   Encounter: [id number]  Team: SOD Team A    PATIENT INFORMATION     Name: Angeli Hernández   Age & Sex: 35 y o  male   MRN: [id number]  Hospital Stay Days: 4    ASSESSMENT/PLAN     Principal Problem:    Staphylococcus aureus bacteremia  Active Problems:    Opioid use disorder ([address] )    Hepatitis C    Nicotine dependence    Smoking      Hepatitis C  Assessment & Plan  Per chart review, chronic hepatitis C  Did see Dr Valentine Damon outpatient in November of 2020, and was started on [address] on 12/14/2020, but patient reports stopping treatment senior care as he did not receive the second pack of medications in mail yet  · Will need close outpatient follow-up with GI for treatment     Opioid use disorder [facility])  Assessment & Plan  History of IVDU in past including methamphetamine, heroin, and cocaine  Has not used in about a year, until he relapsed 10 days ago  Used 1 time  Reports being on Suboxone, per PDMP reviewed has not been filled since July 2020  Patient did receive 1 dose of Suboxone 8/2 mg b i d, well as IV Valium, fentanyl, and Toradol at [facility]  · UDS 2/27 + cocaine and methamphetamine, benzo and opiate  · Will contact [facility] [address] patient reports receiving medications from an confirm dosing and recent refills  · Will consult toxicology as patient is interested restarting Suboxone versus Subutex  · For now continue with p r n  Symptom control with Tylenol, clonidine, Robaxin, and loperamide p r n   · 3/1: patient will start suboxone   · COWS score q 1 hr give 4/1mg until COWs <8  · Pt is refusing SUBOXONE  Per toxocology he can ONLY have suboxone therapy for withdrawals      * Staphylococcus aureus bacteremia  Assessment & Plan  Hx of MSSA bacteremia and possible septic pulmonary emboli through 04/03/2020 (tx with 4 days of cefazolin IV while admitted at [facility], then one week of  daptomycin IV at infusion center through 03/17/2020, and Patient was transitioned to linezolid p o  For the last 2 weeks of his antibiotic course)     Patient has a history of IV drug abuse and presented to [facility] 2 days ago with fever chest pain and dyspnea  Was receiving IV antibiotics for suspected endocarditis but decided to leave A as he was unhappy with care and decided to come to [facility]     Per chart review, 1/2 blood cultures positive for Staph a susceptible to vancomycin  CTA C/A/P w/o acute abnormalities and echocardiogram at [facility] did not show any signs of embolism or septic emboli  EKG sinus tachycardia with no acute ST/T-wave changes  + leukocytosis at 18,000  , procalcitonin 0 75  · Repeat blood cultures once again 1/2 positive for GPC  · Repeat blood cultures drawn on 3/1  · Continue with IV Ancef  · Infectious disease consulted, appreciate recommendations  · Monitor fever curve white blood cell count    MAURA 3/2- EF 60 %, No RWMA, trace MR, mild TR, No vegetation or aneurysm  -will continue antibiotics  Acute right-sided thoracic back pain-resolved as of 2/28/2021  Assessment & Plan  Resolved  Noted to have four-day history of worsening right-sided thoracic back pain, sharp in nature  Appears to be paraspinal area T10-11  Noted  improvement with Robaxin and Valium received at [facility]  likely musculoskeletal  · , ESR 86  Benign exam, hold off on MRI at this time  · P r n  Pain management with Tylenol, Robaxin    TRAVIS (acute kidney injury) (HCC)-resolved as of 2/28/2021  Assessment & Plan  creatinine on presentation was 1 51,baseline appears to be around 1  Etiology unknown possibly in setting of receiving multiple doses of Toradol versus poor p o  Intake   patient received 1 L isolate in the emergency department  · Creatinine 0 88 today  · follow-up BMP and trend creatinine        Disposition: inpatient for IV antibiotics      SUBJECTIVE     Patient seen and examined  No acute events overnight  Patient had no complaints this morning and appeared much improved from yesterday as he is now only experiencing some restless legs from withdrawal but no other symptoms  He refused his suboxone yesterday as he feels it causes his "stomach to turn" and requests being switched to subutex  He denies fever, chills, chest pain, SOB, nausea, vomiting, and all other ROS are negative  OBJECTIVE     Vitals:    03/01/21 0734 03/01/21 1559 03/01/21 2146 03/02/21 0725   BP: 140/81 138/78 134/71 155/87   Pulse:   91 85   Resp:   18 18   Temp: 98 4 °F (36 9 °C) 98 1 °F (36 7 °C) 98 3 °F (36 8 °C) 98 7 °F (37 1 °C)   TempSrc:   Oral    SpO2:       Weight:       Height:          Temperature:   No data recorded  Temperature: 98 7 °F (37 1 °C)  Intake & Output:  I/O       03/01 0701 - 03/02 0700 03/02 0701 - 03/03 0700 03/03 0701 - 03/04 0700    P  O        I V  (mL/kg)  300 (3 4)     IV Piggyback 100      Total Intake(mL/kg) 100 (1 1) 300 (3 4)     Net +100 +300            Unmeasured Urine Occurrence 1 x          Weights:   IBW: 68 4 kg    Body mass index is 29 65 kg/m²  Weight (last 2 days)     None        Physical Exam  Constitutional:       General: He is not in acute distress  Appearance: He is not toxic-appearing  HENT:      Mouth/Throat:      Mouth: Mucous membranes are moist    Cardiovascular:      Rate and Rhythm: Normal rate and regular rhythm  Heart sounds: S1 normal and S2 normal  Murmur (Tricuspid) present  Systolic murmur present  Pulmonary:      Effort: No respiratory distress  Breath sounds: Normal breath sounds  No wheezing or rales  Abdominal:      General: Bowel sounds are normal  There is no distension  Palpations: Abdomen is soft  Tenderness: There is no abdominal tenderness  Skin:     Capillary Refill: Capillary refill takes less than 2 seconds  Neurological:      Mental Status: He is alert and oriented to person, place, and time  LABORATORY DATA     Labs: I have personally reviewed pertinent reports  Results from last 7 days   Lab Units 03/02/21  0559 02/27/21  0535 02/26/21  0115   WBC Thousand/uL 16 33* 10 36* 18 94*   HEMOGLOBIN g/dL 17 6* 13 0 15 2   HEMATOCRIT % 50 2* 38 6 43 8   PLATELETS Thousands/uL 469* 285 295   NEUTROS PCT % 76* 60 78*   MONOS PCT % 7 11 12      Results from last 7 days   Lab Units 03/02/21 2052 02/28/21  0801 02/27/21  0535 02/26/21  0115   POTASSIUM mmol/L 4 3 4 3 3 3* 3 9   CHLORIDE mmol/L 103 106 107 102   CO2 mmol/L 30 25 30 27   BUN mg/dL 20 6 11 17   CREATININE mg/dL 1 38* 0 60 0 88 1 51*   CALCIUM mg/dL 9 1 6 8* 8 4 8 7   ALK PHOS U/L  --   --   --  59   ALT U/L  --   --   --  46   AST U/L  --   --   --  24              Results from last 7 days   Lab Units 02/26/21  0115   INR  1 10   PTT seconds 29     Results from last 7 days   Lab Units 02/26/21  0115   LACTIC ACID mmol/L 1 9           IMAGING & DIAGNOSTIC TESTING     Radiology Results: I have personally reviewed pertinent reports  No results found  Other Diagnostic Testing: I have personally reviewed pertinent reports      ACTIVE MEDICATIONS     Current Facility-Administered Medications   Medication Dose Route Frequency    acetaminophen (TYLENOL) tablet 650 mg  650 mg Oral Q6H PRN    buprenorphine-naloxone (SUBOXONE) 8-2 mg per SL film 1 Film  1 Film Sublingual Daily    ceFAZolin (ANCEF) IVPB (premix in dextrose) 2,000 mg 50 mL  2,000 mg Intravenous Q8H    lactated ringers infusion  125 mL/hr Intravenous Continuous    loperamide (IMODIUM) capsule 2 mg  2 mg Oral Q4H PRN    melatonin tablet 3 mg  3 mg Oral HS    methocarbamol (ROBAXIN) tablet 500 mg  500 mg Oral Q6H PRN    nicotine (NICODERM CQ) 7 mg/24hr TD 24 hr patch 1 patch  1 patch Transdermal Daily       VTE Pharmacologic Prophylaxis: Sequential compression device (Venodyne)   VTE Mechanical Prophylaxis: sequential compression device    Portions of the record may have been created with voice recognition software  Occasional wrong word or "sound a like" substitutions may have occurred due to the inherent limitations of voice recognition software    Read the chart carefully and recognize, using context, where substitutions have occurred   ==  Nadia Leon  MS3

## 2021-03-03 NOTE — PROGRESS NOTES
Discussed with SOD A pt is wanting to sign out AMA if cannot change med from [facility], resident to talk to toxicology and come back

## 2021-03-03 NOTE — DISCHARGE SUMMARY
INTERNAL MEDICINE RESIDENCY DISCHARGE SUMMARY     Reagan Moses   35 y o  male  MRN: [id number]  Room/Bed: /MS [address] MED SURG 7   Encounter: [id number]    Principal Problem:    Staphylococcus aureus bacteremia  Active Problems:    Opioid use disorder ([facility] )    Hepatitis C    Nicotine dependence    TRAVIS (acute kidney injury) ([facility] )    Smoking      TRAVIS (acute kidney injury) ([facility] )  Assessment & Plan  creatinine on presentation was 1 51,baseline appears to be around 1  Etiology unknown possibly in setting of receiving multiple doses of Toradol versus poor p o  Intake  patient received 1 L isolate in the emergency department      · 3/4 Creatinine 1 38 today- likely prerenal, decrease PO intake  · Will give fluids  cc/hr  · Follow up BMP    Hepatitis C  Assessment & Plan  Per chart review, chronic hepatitis C  Did see Dr Titi Montejo outpatient in November of 2020, and was started on [address] on 12/14/2020, but patient reports stopping treatment [facility] as he did not receive the second pack of medications in mail yet  · Will need close outpatient follow-up with GI for treatment     Opioid use disorder [facility])  Assessment & Plan  History of IVDU in past including methamphetamine, heroin, and cocaine  Has not used in about a year, until he relapsed 10 days ago  Used 1 time  Reports being on Suboxone, per PDMP reviewed has not been filled since July 2020  Patient did receive 1 dose of Suboxone 8/2 mg b i d, well as IV Valium, fentanyl, and Toradol at [facility]  · UDS 2/27 + cocaine and methamphetamine, benzo and opiate  · Will contact [facility] patient reports receiving medications from an confirm dosing and recent refills  · Will consult toxicology as patient is interested restarting Suboxone versus Subutex  · For now continue with p r n   Symptom control with Tylenol, clonidine, Robaxin, and loperamide p r n   · 3/1: patient will start suboxone · COWS score q 1 hr give 4/1mg until COWs <8  · Pt is refusing SUBOXONE  Per toxocology he can ONLY have suboxone therapy for withdrawals  * Staphylococcus aureus bacteremia  Assessment & Plan  Hx of MSSA bacteremia and possible septic pulmonary emboli through 04/03/2020 (tx with 4 days of cefazolin IV while admitted at [facility], then one week of  daptomycin IV at infusion center through 03/17/2020, and Patient was transitioned to linezolid p o  For the last 2 weeks of his antibiotic course)     Patient has a history of IV drug abuse and presented to [facility] 2 days ago with fever chest pain and dyspnea  Was receiving IV antibiotics for suspected endocarditis but decided to leave AMA as he was unhappy with care and decided to come to B     Per chart review, 1/2 blood cultures positive for Staph a susceptible to vancomycin  CTA C/A/P w/o acute abnormalities and echocardiogram at [facility] did not show any signs of embolism or septic emboli  EKG sinus tachycardia with no acute ST/T-wave changes  + leukocytosis at 18,000  , procalcitonin 0 75  · Repeat blood cultures once again 1/2 positive for GPC  · Repeat blood cultures drawn on 3/1  · Continue with IV Ancef D4  · Infectious disease consulted, appreciate recommendations  · Monitor fever curve white blood cell count    MAURA 3/2- EF 60 %, No RWMA, trace MR, mild TR, No vegetation or aneurysm  -will continue antibiotics  3/4-pt wanted to leave AMA yday  However now would like to stay for IV abx and Rehab post discharge    -pending repeat Cx  Acute right-sided thoracic back pain-resolved as of 2/28/2021  Assessment & Plan  Resolved  Noted to have four-day history of worsening right-sided thoracic back pain, sharp in nature  Appears to be paraspinal area T10-11  Noted  improvement with Robaxin and Valium received at [employer] [facility] [employer]  likely musculoskeletal  · , ESR 86  Benign exam, hold off on MRI at this time  · P r n   Pain management with Tylenol, Robaxin        DETAILS OF HOSPITAL COURSE     Corrinne Ditty is a 33yo M with PMH of opioid use disorder, chronic hepatitis C, and staph aureus bacteremia/endocarditis, who presented initially to [facility] on 2/24/21 with chest pain, fevers, and shortness of breath and was empirically started on vancomycin and cefepime and then left AMA on 2/25/21 since the "care was not good" and then presented to [facility] ED on 2/25/21 and then left AMA on 2/26/21 for 3 hours until returning to [facility] on 2/27/21 because "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 " His temperature in the ED on 2/27/21 was 97 3, pulse 97, RR 18, /75, and saturating 97% on room air  1/2 blood cultures from [facility] was positive for staph aureus susceptible to vancomycin  His CBC showed a wbc count of 10 36 and he was continued on cefazolin  For his bacteremia, a transthoracic echocardiogram was performed on 3/2/21 and showed no evidence of vegetations, EF 60%, and trace MR, TR, CA  He received cefazolin starting on 2/27/21 and will be switched to cephlex upon leaving AM  For his back pain, the physical exam was benign at admission and it resolved on its own  For his opioid use disorder, toxicology was consulted and recommended for suboxone and against subutex  The patient went through withdrawal after receiving his first dose of suboxone on 3/1/21 and then refused subsequent doses  He then decided to leave [facility] [place] as he wanted subutex but was told he could not receive it given toxicology's note  He originally was going to enter inpatient drug rehab upon discharge  For his hepatitis C, he will need outpatient follow up with GI  For his TRAVIS, his creatinine was 1 39 on 3/3/21, 1 38 on 3/2/21, and 0 6 on 2/28/21 and was thought to be prerenal due to being NPO since the night of 2/28/21 until his echo on 3/2/21 since he refused it on 3/1/21 due to withdrawal symptoms       Medications at discharge:  · Cephlex for 10 days    To do:   · PCP follow up for bacteremia clearance and antibiotic response   · Outpatient follow up for hepatitis C and elevated blood pressure readings while in the hospital     Pt left AMA  He was explained the risk of cutting the treatment short and can have persistent bacteremia leading to death  He was given 10 days of kephlex  He acknowledges and understands the risk with leaving against medical advise  DISCHARGE INFORMATION     PCP at [address]    Admitting Provider: Maryjane Alejandre MD  Admission Date: 2/27/2021    Discharge Provider: Dr Isidro Hobbs  Discharge Date: 3/3/2021    Discharge Disposition: Left against medical advice or discontinued care  Discharge Condition: good  Discharge with Lines: no    Discharge Diet: regular diet  Activity Restrictions: as tolerated  Test Results Pending at Discharge: none    Discharge Diagnoses:  Principal Problem:    Staphylococcus aureus bacteremia  Active Problems:    Opioid use disorder ([facility] )    Hepatitis C    Nicotine dependence    TRAVIS (acute kidney injury) ([facility] )    Smoking  Resolved Problems:    Acute right-sided thoracic back pain      Consulting Providers:      Diagnostic & Therapeutic Procedures Performed:  Xr Chest Portable    Result Date: 3/3/2021  Impression: No acute cardiopulmonary disease  Workstation performed: [id number]       Code Status: Level 1 - Full Code  Advance Directive & Living Will: <no information>  Power of :    POLST:      Medications:  Current Discharge Medication List        Current Discharge Medication List      START taking these medications    Details   cephalexin (KEFLEX) 500 mg capsule Take 1 capsule (500 mg total) by mouth every 6 (six) hours for 10 days  Qty: 40 capsule, Refills: 0    Associated Diagnoses: Staphylococcus aureus bacteremia;  Acute bacterial endocarditis           Current Discharge Medication List      CONTINUE these medications which have NOT CHANGED    Details   ALPRAZolam Hu Mckinnon) 0 5 mg tablet Take 0 5 mg by mouth 3 (three) times a day as needed for anxiety             Allergies:  No Known Allergies    FOLLOW-UP     PCP Outpatient Follow-up:  yes      Follow up: PCP  Date and time: 1 week    Consulting Providers Follow-up:      Active Issues Requiring Follow-up:   yes     Issue: Possible bacteremia left ama      Discharge Statement:   I spent 30 minutes minutes discharging the patient  This time was spent on the day of discharge  I had direct contact with the patient on the day of discharge  Additional documentation is required if more than 30 minutes were spent on discharge  Portions of the record may have been created with voice recognition software  Occasional wrong word or "sound a like" substitutions may have occurred due to the inherent limitations of voice recognition software  Read the chart carefully and recognize, using context, where substitutions have occurred      ==

## 2021-03-03 NOTE — PROGRESS NOTES
Pt left AMA  When checking the room for left belongings, found a non-hospital needle in the bathroom trash can, hidden in beef jerky bag

## 2021-03-04 NOTE — UTILIZATION REVIEW
Notification of Discharge  This is a Notification of Discharge from our facility [address]  Please be advised that this patient has been discharge from our facility  Below you will find the admission and discharge date and time including the patients disposition  PRESENTATION DATE: 2/27/2021  1:00 AM  OBS ADMISSION DATE:   IP ADMISSION DATE: 2/27/21 0136   DISCHARGE DATE: 3/3/2021  5:08 PM  DISPOSITION: Left against medical advice or discontinued care Left against medical advice or discontinued care   Admission Orders listed below:  Admission Orders (From admission, onward)     Ordered        02/27/21 0136  Inpatient Admission  Once                   Please contact the UR Department if additional information is required to close this patient's authorization/case  [address] Utilization Review Department  Main: [phone] x carefully listen to the prompts  All voicemails are confidential   [email or web address]  Send all requests for admission clinical reviews, approved or denied determinations and any other requests to dedicated fax number below belonging to the campus where the patient is receiving treatment   List of dedicated fax numbers:  [address] DENIALS (Administrative/Medical Necessity) [phone]   [address] (Maternity/NICU/Pediatrics) [phone]   Krish Davidfilippo [phone]   [facility] [phone]   [facility] [phone]   Miladis Pulido [facility] [address] [place] [address] [phone]   [facility]  [phone]   [address] [facility] [address]  [address] [facility] [address] [facility] [phone]

## 2021-03-08 LAB
BACTERIA BLD CULT: NORMAL
BACTERIA BLD CULT: NORMAL

## 2021-05-19 NOTE — UTILIZATION REVIEW
Beena Harrington    Attached per your request on 5/19 @ 8:51am is the clinical for this IP DIPQ#[id number] for dates of service to our [facility] 2/27/21 - 3/3/21  Should you have any questions, please contact me at [phone]  Thank you

## 2021-05-19 NOTE — UTILIZATION REVIEW
Inpatient Admission Authorization Request   NOTIFICATION OF INPATIENT ADMISSION/INPATIENT AUTHORIZATION REQUEST   SERVICING FACILITY:   [address] [place] [address]  Address: [address] [place] [address], [address] [place] [address]  Tax ID: [id number]  NPI: [id number]  Place of Service: Inpatient [address] [facility] Code: 24     ATTENDING PROVIDER:  Attending Name and NPI#: Dileep Gallo Md [[id number]]  Address: [address] [place] [address], [address] [place] [address]  Phone: [phone]     UTILIZATION REVIEW CONTACT:  Robyn Olguin Utilization   Network Utilization Review Department  Phone: [phone]  Fax: [phone]  Email: [email or web address]     PHYSICIAN ADVISORY SERVICES:  FOR [id number] REVIEW - MEDICAL NECESSITY DENIAL  Phone: [phone]  Fax: [phone]  Email: [email or web address]     TYPE OF REQUEST:  Inpatient Status     ADMISSION INFORMATION:  ADMISSION DATE/TIME: 2/27/21 0136  PATIENT DIAGNOSIS CODE/DESCRIPTION:  Infection [B99 9]  Chest pain [R07 9]  Fever [R50 9]  Staphylococcus aureus bacteremia [R78 81, B95 61]  DISCHARGE DATE/TIME: 3/3/2021  5:08 PM  DISCHARGE DISPOSITION (IF DISCHARGED): Left against medical advice or discontinued care     IMPORTANT INFORMATION:  Please contact the Robyn Olguin at [phone]  or Suzie Alvarez Physician Advisor Liaison at [phone] directly with any questions or concerns regarding this request  Department voicemails are confidential     Send requests for admission clinical reviews, concurrent reviews, approvals, and administrative denials due to lack of clinical to fax [phone]

## 2024-05-06 NOTE — NURSING NOTE
Patient states he would like to leave AMA at approximately 2200  SOD team saw patient at approximately 2200 to speak with patient regarding AMA  AMA paperwork signed  IV removed  Patient left AMA at approximately 2230  Escorted to DALIA gould by primary RN  alert and awake No

## 2024-10-22 NOTE — ED PROVIDER NOTES
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, thats whyh he left [place] earlier today  Back for treatment     32-year-old male history of IV drug use, recently admitted here within the last 48 hours over concerns for endocarditis  Patient states he left AMA today in order to "close my business", and he is currently returning now and wishes to be admitted to continue his IV antibiotic treatment  Patient denies any IV drug use or alcohol use while he was outside of the hospital   States he is still having severe chest pain and burning  Denies any new symptoms or worsening symptoms since his admission and departure  Prior to Admission Medications   Prescriptions Last Dose Informant Patient Reported? Taking? ALPRAZolam (XANAX) 0 5 mg tablet  Self Yes Yes   Sig: Take 0 5 mg by mouth 3 (three) times a day as needed for anxiety      Facility-Administered Medications: None       Past Medical History:   Diagnosis Date    Drug use     Seizures ([address] )        Past Surgical History:   Procedure Laterality Date    APPENDECTOMY         Family History   Problem Relation Age of Onset    No Known Problems Mother     No Known Problems Father      I have reviewed and agree with the history as documented      E-Cigarette/Vaping    E-Cigarette Use Former User      E-Cigarette/Vaping Substances     Social History     Tobacco Use    Smoking status: Current Some Day Smoker     Packs/day: 0 00     Types: Cigarettes    Smokeless tobacco: Never Used   Substance Use Topics    Alcohol use: Yes     Frequency: 2-3 times a week     Drinks per session: 3 or 4    Drug use: Yes     Types: Prescription, Heroin, Cocaine     Comment: Pt reports last use on wednesday        Review of Systems Constitutional: Positive for fever  Negative for chills  HENT: Negative for ear pain and sore throat  Eyes: Negative for visual disturbance  Respiratory: Positive for chest tightness and shortness of breath  Negative for cough  Cardiovascular: Positive for chest pain  Negative for palpitations  Gastrointestinal: Negative for abdominal pain and vomiting  Genitourinary: Negative for dysuria  Musculoskeletal: Negative for arthralgias and back pain  Skin: Negative for color change and rash  Neurological: Negative for syncope  All other systems reviewed and are negative  Physical Exam  ED Triage Vitals [02/27/21 0108]   Temperature Pulse Respirations Blood Pressure SpO2   (!) 97 3 °F (36 3 °C) 97 18 153/75 97 %      Temp Source Heart Rate Source Patient Position - Orthostatic VS BP Location FiO2 (%)   Oral Monitor Lying Right arm --      Pain Score       Worst Possible Pain             Orthostatic Vital Signs  Vitals:    02/27/21 0200 02/27/21 0230 02/27/21 0330 02/27/21 0400   BP: 139/63 128/59 138/60 130/57   Pulse: 86 86 84 78   Patient Position - Orthostatic VS: Lying          Physical Exam  Vitals signs and nursing note reviewed  Constitutional:       General: He is not in acute distress  Appearance: He is well-developed  He is not diaphoretic  HENT:      Head: Normocephalic and atraumatic  Eyes:      General:         Right eye: No discharge  Left eye: No discharge  Conjunctiva/sclera: Conjunctivae normal       Pupils: Pupils are equal, round, and reactive to light  Neck:      Musculoskeletal: Normal range of motion  Vascular: No JVD  Trachea: No tracheal deviation  Cardiovascular:      Rate and Rhythm: Normal rate and regular rhythm  Heart sounds: Normal heart sounds  No murmur  Pulmonary:      Effort: Pulmonary effort is normal       Breath sounds: Normal breath sounds  No wheezing     Abdominal:      General: Bowel sounds are normal  There is no distension  Palpations: Abdomen is soft  Tenderness: There is no abdominal tenderness  Musculoskeletal: Normal range of motion  Skin:     General: Skin is warm and dry  Capillary Refill: Capillary refill takes less than 2 seconds  Coloration: Skin is not pale  Findings: No erythema or rash  Neurological:      Mental Status: He is alert and oriented to person, place, and time  Sensory: No sensory deficit  Motor: No abnormal muscle tone  Psychiatric:         Speech: Speech normal          Behavior: Behavior normal          Thought Content:  Thought content normal          Judgment: Judgment normal          ED Medications  Medications   nicotine (NICODERM CQ) 7 mg/24hr TD 24 hr patch 1 patch (has no administration in time range)   ceFAZolin (ANCEF) IVPB (premix in dextrose) 2,000 mg 50 mL (2,000 mg Intravenous New Bag 2/27/21 0241)   acetaminophen (TYLENOL) tablet 650 mg (has no administration in time range)   loperamide (IMODIUM) capsule 2 mg (has no administration in time range)   melatonin tablet 3 mg (has no administration in time range)   methocarbamol (ROBAXIN) tablet 500 mg (has no administration in time range)   multi-electrolyte (PLASMALYTE-A/ISOLYTE-S PH 7 4) IV solution (100 mL/hr Intravenous New Bag 2/27/21 0241)   diazepam (VALIUM) tablet 5 mg (has no administration in time range)   cloNIDine (CATAPRES) tablet 0 2 mg (0 2 mg Oral Given 2/27/21 0240)   potassium chloride (K-DUR,KLOR-CON) CR tablet 40 mEq (has no administration in time range)   diazepam (VALIUM) tablet 5 mg (5 mg Oral Given 2/27/21 0154)   ketorolac (TORADOL) injection 15 mg (15 mg Intravenous Given 2/27/21 0154)       Diagnostic Studies  Results Reviewed     Procedure Component Value Units Date/Time    Basic metabolic panel [[id number]]  (Abnormal) Collected: 02/27/21 0535    Lab Status: Final result Specimen: Blood from Arm, Left Updated: 02/27/21 0559     Sodium 141 mmol/L      Potassium 3 3 mmol/L      Chloride 107 mmol/L      CO2 30 mmol/L      ANION GAP 4 mmol/L      BUN 11 mg/dL      Creatinine 0 88 mg/dL      Glucose 139 mg/dL      Calcium 8 4 mg/dL      eGFR 116 ml/min/1 73sq m     Narrative:      [place] guidelines for Chronic Kidney Disease (CKD):     Stage 1 with normal or high GFR (GFR > 90 mL/min/1 73 square meters)    Stage 2 Mild CKD (GFR = 60-89 mL/min/1 73 square meters)    Stage 3A Moderate CKD (GFR = 45-59 mL/min/1 73 square meters)    Stage 3B Moderate CKD (GFR = 30-44 mL/min/1 73 square meters)    Stage 4 Severe CKD (GFR = 15-29 mL/min/1 73 square meters)    Stage 5 End Stage CKD (GFR <15 mL/min/1 73 square meters)  Note: GFR calculation is accurate only with a steady state creatinine    CBC and differential [[id number]]  (Abnormal) Collected: 02/27/21 0535    Lab Status: Final result Specimen: Blood from Arm, Left Updated: 02/27/21 0544     WBC 10 36 Thousand/uL      RBC 4 35 Million/uL      Hemoglobin 13 0 g/dL      Hematocrit 38 6 %      MCV 89 fL      MCH 29 9 pg      MCHC 33 7 g/dL      RDW 11 9 %      MPV 9 0 fL      Platelets 332 Thousands/uL      nRBC 0 /100 WBCs      Neutrophils Relative 60 %      Immat GRANS % 1 %      Lymphocytes Relative 23 %      Monocytes Relative 11 %      Eosinophils Relative 5 %      Basophils Relative 0 %      Neutrophils Absolute 6 31 Thousands/µL      Immature Grans Absolute 0 05 Thousand/uL      Lymphocytes Absolute 2 33 Thousands/µL      Monocytes Absolute 1 13 Thousand/µL      Eosinophils Absolute 0 50 Thousand/µL      Basophils Absolute 0 04 Thousands/µL                  No orders to display         Procedures  Procedures      ED Course                                       MDM  Number of Diagnoses or Management Options  Chest pain:   Fever:   Diagnosis management comments: Patient immediately accepted by SOD and will continue previous antibiotic regimen      Disposition  Final diagnoses:   Fever   Chest pain Time reflects when diagnosis was documented in both MDM as applicable and the Disposition within this note     Time User Action Codes Description Comment    2/27/2021  1:36 AM Kulkarni Feeler Add [R50 9] Fever     2/27/2021  1:36 AM Kulkarni Feeler Add [R07 9] Chest pain     2/27/2021  2:24 AM Mariam Seen Add [R78 81,  B95 61] Staphylococcus aureus bacteremia       ED Disposition     ED Disposition Condition Date/Time Comment    Admit Stable Sat Feb 27, 2021  1:36 AM Case was discussed with SOD and the patient's admission status was agreed to be Admission Status: inpatient status to the service of Dr Meryle Meadows   Follow-up Information    None         Patient's Medications   Discharge Prescriptions    No medications on file     No discharge procedures on file  PDMP Review     None           ED Provider  Attending physically available and evaluated Erin Ceja I managed the patient along with the ED Attending      Electronically Signed by         Rafiq Matos DO  02/27/21 4092 What Type Of Note Output Would You Prefer (Optional)?: Standard Output Hpi Title: Evaluation of Skin Lesions How Severe Are Your Spot(S)?: moderate Have Your Spot(S) Been Treated In The Past?: has not been treated Location: Right and left arm Year Removed: 2014, 2016

## 2025-05-13 ENCOUNTER — OFFICE VISIT (OUTPATIENT)
Dept: GASTROENTEROLOGY | Facility: MEDICAL CENTER | Age: 35
End: 2025-05-13
Payer: COMMERCIAL

## 2025-05-13 VITALS
BODY MASS INDEX: 31.11 KG/M2 | DIASTOLIC BLOOD PRESSURE: 83 MMHG | TEMPERATURE: 96.6 F | HEART RATE: 84 BPM | SYSTOLIC BLOOD PRESSURE: 144 MMHG | WEIGHT: 204.6 LBS

## 2025-05-13 DIAGNOSIS — Z86.19 HISTORY OF HEPATITIS C: ICD-10-CM

## 2025-05-13 DIAGNOSIS — R74.01 ELEVATED AST (SGOT): Primary | ICD-10-CM

## 2025-05-13 DIAGNOSIS — R74.01 ELEVATED ALT MEASUREMENT: ICD-10-CM

## 2025-05-13 DIAGNOSIS — R93.89 ABNORMAL ULTRASOUND: ICD-10-CM

## 2025-05-13 DIAGNOSIS — R10.11 RUQ PAIN: ICD-10-CM

## 2025-05-13 PROCEDURE — 99244 OFF/OP CNSLTJ NEW/EST MOD 40: CPT | Performed by: INTERNAL MEDICINE

## 2025-05-13 RX ORDER — BUPRENORPHINE HYDROCHLORIDE AND NALOXONE HYDROCHLORIDE DIHYDRATE 8; 2 MG/1; MG/1
1 TABLET SUBLINGUAL DAILY
COMMUNITY

## 2025-05-13 NOTE — PROGRESS NOTES
Name: Santos Leyva      : 1990      MRN: [id number]  Encounter Provider: Eddy Patel MD  Encounter Date: 2025   Encounter department: [facility] [place]  :  Assessment & Plan  Elevated AST (SGOT)    Elevated ALT measurement    History of hepatitis C      Abnormal ultrasound  Orders:    CBC and differential; Future    Basic metabolic panel; Future    Hepatic function panel; Future    Hepatitis C Viral RNA W/ Rfl NS5A Drug Resist; Future    TSH, 3rd generation; Future    US abdomen limited; Future     Slightly elevated AST and ALT with normal bilirubin likely secondary to steatohepatitis.  Patient drinks 2 tequila every night.  He was counseled on  Alcohol abstinence or least decreasing the amount of alcohol.  Labs to confirm hepatitis C cure  Patient was counseled on steatohepatitis.      RUQ pain  US to rule out gallstone disease     Follow up in 3 months.           History of Present Illness   HPI  Santos Leyva is a 35 y.o. male who presents for evaluation of elevated liver enzymes and fatty liver.  Patient had history of hepatitis C and was treated and was told he was cured 10 years ago.  He had symptoms of acute hepatitis at that time with jaundice.  He reported he was given medication by our service at that time and eventually hepatitis C was cured.  He recently noticed right upper quadrant abdominal pain which is not related with food intake.  He reported regular bowel movement with no blood in his stool.  Denies family history of liver cancer.  He drinks 2 tequila every night in the past year.  He is functional denies history of being drunk  He had a history of IV drug use more than 10 years ago which is likely how he got hepatitis C.  History obtained from: patient    Review of Systems  Past Medical History   Past Medical History:   Diagnosis Date    BP (high blood pressure)     Drug use     Seizures (HCC)     Umbilical hernia      Past Surgical History:    Procedure Laterality Date    APPENDECTOMY       Family History   Problem Relation Age of Onset    No Known Problems Mother     No Known Problems Father       reports that he has been smoking cigarettes. He has never used smokeless tobacco. He reports current alcohol use. He reports current drug use. Drugs: Prescription, Heroin, and Cocaine.  Current Outpatient Medications   Medication Instructions    ALPRAZolam (XANAX) 0.5 mg, 3 times daily PRN    buprenorphine-naloxone (SUBOXONE) 8-2 mg per SL tablet 1 tablet, Daily   No Known Allergies      Objective   /83   Pulse 84   Temp (!) 96.6 °F (35.9 °C)   Wt 92.8 kg (204 lb 9.6 oz)   BMI 31.11 kg/m²      Physical Exam

## 2025-05-14 ENCOUNTER — APPOINTMENT (OUTPATIENT)
Dept: LAB | Age: 35
End: 2025-05-14
Payer: COMMERCIAL

## 2025-05-14 DIAGNOSIS — Z86.19 HISTORY OF HEPATITIS C: ICD-10-CM

## 2025-05-14 LAB
ALBUMIN SERPL BCG-MCNC: 4.4 G/DL (ref 3.5–5)
ALP SERPL-CCNC: 42 U/L (ref 34–104)
ALT SERPL W P-5'-P-CCNC: 70 U/L (ref 7–52)
ANION GAP SERPL CALCULATED.3IONS-SCNC: 9 MMOL/L (ref 4–13)
AST SERPL W P-5'-P-CCNC: 45 U/L (ref 13–39)
BASOPHILS # BLD AUTO: 0.03 THOUSANDS/ÂΜL (ref 0–0.1)
BASOPHILS NFR BLD AUTO: 0 % (ref 0–1)
BILIRUB DIRECT SERPL-MCNC: 0.24 MG/DL (ref 0–0.2)
BILIRUB SERPL-MCNC: 1.44 MG/DL (ref 0.2–1)
BUN SERPL-MCNC: 12 MG/DL (ref 5–25)
CALCIUM SERPL-MCNC: 9.5 MG/DL (ref 8.4–10.2)
CHLORIDE SERPL-SCNC: 99 MMOL/L (ref 96–108)
CO2 SERPL-SCNC: 30 MMOL/L (ref 21–32)
CREAT SERPL-MCNC: 1.11 MG/DL (ref 0.6–1.3)
EOSINOPHIL # BLD AUTO: 0.66 THOUSAND/ÂΜL (ref 0–0.61)
EOSINOPHIL NFR BLD AUTO: 9 % (ref 0–6)
ERYTHROCYTE [DISTWIDTH] IN BLOOD BY AUTOMATED COUNT: 11.9 % (ref 11.6–15.1)
GFR SERPL CREATININE-BSD FRML MDRD: 86 ML/MIN/1.73SQ M
GLUCOSE P FAST SERPL-MCNC: 99 MG/DL (ref 65–99)
HCT VFR BLD AUTO: 50.7 % (ref 36.5–49.3)
HGB BLD-MCNC: 17.3 G/DL (ref 12–17)
IMM GRANULOCYTES # BLD AUTO: 0.01 THOUSAND/UL (ref 0–0.2)
IMM GRANULOCYTES NFR BLD AUTO: 0 % (ref 0–2)
LYMPHOCYTES # BLD AUTO: 2.26 THOUSANDS/ÂΜL (ref 0.6–4.47)
LYMPHOCYTES NFR BLD AUTO: 30 % (ref 14–44)
MCH RBC QN AUTO: 29.9 PG (ref 26.8–34.3)
MCHC RBC AUTO-ENTMCNC: 34.1 G/DL (ref 31.4–37.4)
MCV RBC AUTO: 88 FL (ref 82–98)
MONOCYTES # BLD AUTO: 0.79 THOUSAND/ÂΜL (ref 0.17–1.22)
MONOCYTES NFR BLD AUTO: 10 % (ref 4–12)
NEUTROPHILS # BLD AUTO: 3.81 THOUSANDS/ÂΜL (ref 1.85–7.62)
NEUTS SEG NFR BLD AUTO: 51 % (ref 43–75)
NRBC BLD AUTO-RTO: 0 /100 WBCS
PLATELET # BLD AUTO: 251 THOUSANDS/UL (ref 149–390)
PMV BLD AUTO: 9.9 FL (ref 8.9–12.7)
POTASSIUM SERPL-SCNC: 3.8 MMOL/L (ref 3.5–5.3)
PROT SERPL-MCNC: 7.1 G/DL (ref 6.4–8.4)
RBC # BLD AUTO: 5.79 MILLION/UL (ref 3.88–5.62)
SODIUM SERPL-SCNC: 138 MMOL/L (ref 135–147)
TSH SERPL DL<=0.05 MIU/L-ACNC: 3.61 UIU/ML (ref 0.45–4.5)
WBC # BLD AUTO: 7.56 THOUSAND/UL (ref 4.31–10.16)

## 2025-05-14 PROCEDURE — 84443 ASSAY THYROID STIM HORMONE: CPT

## 2025-05-14 PROCEDURE — 80076 HEPATIC FUNCTION PANEL: CPT

## 2025-05-14 PROCEDURE — 36415 COLL VENOUS BLD VENIPUNCTURE: CPT

## 2025-05-14 PROCEDURE — 80048 BASIC METABOLIC PNL TOTAL CA: CPT

## 2025-05-14 PROCEDURE — 86682 HELMINTH ANTIBODY: CPT

## 2025-05-14 PROCEDURE — 85025 COMPLETE CBC W/AUTO DIFF WBC: CPT

## 2025-05-15 ENCOUNTER — HOSPITAL ENCOUNTER (OUTPATIENT)
Dept: ULTRASOUND IMAGING | Facility: HOSPITAL | Age: 35
Discharge: HOME | End: 2025-05-15
Attending: INTERNAL MEDICINE
Payer: COMMERCIAL

## 2025-05-15 ENCOUNTER — RESULTS FOLLOW-UP (OUTPATIENT)
Dept: GASTROENTEROLOGY | Facility: MEDICAL CENTER | Age: 35
End: 2025-05-15

## 2025-05-15 DIAGNOSIS — Z86.19 HISTORY OF HEPATITIS C: ICD-10-CM

## 2025-05-15 PROCEDURE — 76705 ECHO EXAM OF ABDOMEN: CPT

## 2025-05-20 ENCOUNTER — RESULTS FOLLOW-UP (OUTPATIENT)
Dept: GASTROENTEROLOGY | Facility: MEDICAL CENTER | Age: 35
End: 2025-05-20

## 2025-05-20 DIAGNOSIS — K76.9 LIVER LESION: Primary | ICD-10-CM

## 2025-05-20 NOTE — RESULT ENCOUNTER NOTE
Labs showed elevated liver enzymes indicating liver inflammation.   This could be related with alcohol use but is pending hepatitis C viral load results.   Thyroid function normal. Kidney function test normal.   Follow up with PCP to discuss testosterone use and risks of thromboembolic events.

## 2025-05-27 LAB — MISCELLANEOUS LAB TEST RESULT: NORMAL

## 2025-05-30 NOTE — RESULT ENCOUNTER NOTE
US showed enlarged liver but no signs of scar tissue. A few non specific spots will need follow up US in 3 months, please mail patient the prescription..   No gallstone disease.